# Patient Record
Sex: MALE | Race: ASIAN | NOT HISPANIC OR LATINO | Employment: FULL TIME | URBAN - METROPOLITAN AREA
[De-identification: names, ages, dates, MRNs, and addresses within clinical notes are randomized per-mention and may not be internally consistent; named-entity substitution may affect disease eponyms.]

---

## 2022-09-25 ENCOUNTER — APPOINTMENT (OUTPATIENT)
Dept: CT IMAGING | Facility: CLINIC | Age: 32
DRG: 488 | End: 2022-09-25
Attending: EMERGENCY MEDICINE
Payer: COMMERCIAL

## 2022-09-25 ENCOUNTER — HOSPITAL ENCOUNTER (INPATIENT)
Facility: CLINIC | Age: 32
LOS: 9 days | Discharge: HOME HEALTH | DRG: 488 | End: 2022-10-05
Attending: PHYSICIAN ASSISTANT | Admitting: INTERNAL MEDICINE
Payer: COMMERCIAL

## 2022-09-25 ENCOUNTER — APPOINTMENT (OUTPATIENT)
Dept: GENERAL RADIOLOGY | Facility: CLINIC | Age: 32
DRG: 488 | End: 2022-09-25
Attending: PHYSICIAN ASSISTANT
Payer: COMMERCIAL

## 2022-09-25 DIAGNOSIS — M25.461 EFFUSION OF RIGHT KNEE JOINT: ICD-10-CM

## 2022-09-25 DIAGNOSIS — R07.89 LEFT-SIDED CHEST WALL PAIN: ICD-10-CM

## 2022-09-25 DIAGNOSIS — R50.9 FEBRILE ILLNESS: ICD-10-CM

## 2022-09-25 DIAGNOSIS — M00.261 STREPTOCOCCAL ARTHRITIS OF RIGHT KNEE (H): Primary | ICD-10-CM

## 2022-09-25 LAB
ALBUMIN SERPL BCG-MCNC: 3.8 G/DL (ref 3.5–5.2)
ALP SERPL-CCNC: 83 U/L (ref 40–129)
ALT SERPL W P-5'-P-CCNC: 38 U/L (ref 10–50)
ANION GAP SERPL CALCULATED.3IONS-SCNC: 12 MMOL/L (ref 7–15)
APPEARANCE FLD: ABNORMAL
AST SERPL W P-5'-P-CCNC: 27 U/L (ref 10–50)
BASOPHILS # BLD AUTO: 0 10E3/UL (ref 0–0.2)
BASOPHILS NFR BLD AUTO: 0 %
BILIRUB DIRECT SERPL-MCNC: 0.5 MG/DL (ref 0–0.3)
BILIRUB SERPL-MCNC: 1.3 MG/DL
BUN SERPL-MCNC: 10.7 MG/DL (ref 6–20)
CALCIUM SERPL-MCNC: 9.3 MG/DL (ref 8.6–10)
CELL COUNT BODY FLUID SOURCE: ABNORMAL
CHLORIDE SERPL-SCNC: 99 MMOL/L (ref 98–107)
COLOR FLD: YELLOW
CREAT SERPL-MCNC: 0.84 MG/DL (ref 0.67–1.17)
CRP SERPL-MCNC: 309.97 MG/L
DEPRECATED HCO3 PLAS-SCNC: 24 MMOL/L (ref 22–29)
EOSINOPHIL # BLD AUTO: 0 10E3/UL (ref 0–0.7)
EOSINOPHIL NFR BLD AUTO: 0 %
ERYTHROCYTE [DISTWIDTH] IN BLOOD BY AUTOMATED COUNT: 13.1 % (ref 10–15)
ERYTHROCYTE [SEDIMENTATION RATE] IN BLOOD BY WESTERGREN METHOD: 73 MM/HR (ref 0–15)
FLUAV RNA SPEC QL NAA+PROBE: NEGATIVE
FLUBV RNA RESP QL NAA+PROBE: NEGATIVE
GFR SERPL CREATININE-BSD FRML MDRD: >90 ML/MIN/1.73M2
GLUCOSE SERPL-MCNC: 113 MG/DL (ref 70–99)
HCT VFR BLD AUTO: 37.7 % (ref 40–53)
HGB BLD-MCNC: 12.5 G/DL (ref 13.3–17.7)
IMM GRANULOCYTES # BLD: 0.1 10E3/UL
IMM GRANULOCYTES NFR BLD: 1 %
LYMPHOCYTES # BLD AUTO: 1.6 10E3/UL (ref 0.8–5.3)
LYMPHOCYTES NFR BLD AUTO: 13 %
LYMPHOCYTES NFR FLD MANUAL: 19 %
MCH RBC QN AUTO: 29.3 PG (ref 26.5–33)
MCHC RBC AUTO-ENTMCNC: 33.2 G/DL (ref 31.5–36.5)
MCV RBC AUTO: 88 FL (ref 78–100)
MONOCYTES # BLD AUTO: 1.7 10E3/UL (ref 0–1.3)
MONOCYTES NFR BLD AUTO: 14 %
MONOS+MACROS NFR FLD MANUAL: 57 %
NEUTROPHILS # BLD AUTO: 9.1 10E3/UL (ref 1.6–8.3)
NEUTROPHILS NFR BLD AUTO: 72 %
NEUTS BAND NFR FLD MANUAL: 24 %
NRBC # BLD AUTO: 0 10E3/UL
NRBC BLD AUTO-RTO: 0 /100
PLATELET # BLD AUTO: 218 10E3/UL (ref 150–450)
POTASSIUM SERPL-SCNC: 3.9 MMOL/L (ref 3.4–5.3)
PROT SERPL-MCNC: 8.1 G/DL (ref 6.4–8.3)
RBC # BLD AUTO: 4.27 10E6/UL (ref 4.4–5.9)
SARS-COV-2 RNA RESP QL NAA+PROBE: NEGATIVE
SODIUM SERPL-SCNC: 135 MMOL/L (ref 136–145)
URATE SERPL-MCNC: 3.9 MG/DL (ref 3.4–7)
WBC # BLD AUTO: 12.5 10E3/UL (ref 4–11)
WBC # FLD AUTO: ABNORMAL /UL

## 2022-09-25 PROCEDURE — 250N000013 HC RX MED GY IP 250 OP 250 PS 637: Performed by: PHYSICIAN ASSISTANT

## 2022-09-25 PROCEDURE — 73701 CT LOWER EXTREMITY W/DYE: CPT | Mod: RT

## 2022-09-25 PROCEDURE — 250N000011 HC RX IP 250 OP 636: Performed by: EMERGENCY MEDICINE

## 2022-09-25 PROCEDURE — 87040 BLOOD CULTURE FOR BACTERIA: CPT | Performed by: EMERGENCY MEDICINE

## 2022-09-25 PROCEDURE — 250N000013 HC RX MED GY IP 250 OP 250 PS 637: Performed by: EMERGENCY MEDICINE

## 2022-09-25 PROCEDURE — 73562 X-RAY EXAM OF KNEE 3: CPT | Mod: RT

## 2022-09-25 PROCEDURE — 86140 C-REACTIVE PROTEIN: CPT | Performed by: EMERGENCY MEDICINE

## 2022-09-25 PROCEDURE — 80053 COMPREHEN METABOLIC PANEL: CPT | Performed by: EMERGENCY MEDICINE

## 2022-09-25 PROCEDURE — C9803 HOPD COVID-19 SPEC COLLECT: HCPCS

## 2022-09-25 PROCEDURE — 87476 LYME DIS DNA AMP PROBE: CPT | Performed by: INTERNAL MEDICINE

## 2022-09-25 PROCEDURE — 85025 COMPLETE CBC W/AUTO DIFF WBC: CPT | Performed by: EMERGENCY MEDICINE

## 2022-09-25 PROCEDURE — 87205 SMEAR GRAM STAIN: CPT | Performed by: PHYSICIAN ASSISTANT

## 2022-09-25 PROCEDURE — 99285 EMERGENCY DEPT VISIT HI MDM: CPT | Mod: 25

## 2022-09-25 PROCEDURE — 36415 COLL VENOUS BLD VENIPUNCTURE: CPT | Performed by: EMERGENCY MEDICINE

## 2022-09-25 PROCEDURE — 82248 BILIRUBIN DIRECT: CPT | Performed by: EMERGENCY MEDICINE

## 2022-09-25 PROCEDURE — 71046 X-RAY EXAM CHEST 2 VIEWS: CPT

## 2022-09-25 PROCEDURE — 87077 CULTURE AEROBIC IDENTIFY: CPT | Performed by: EMERGENCY MEDICINE

## 2022-09-25 PROCEDURE — 89060 EXAM SYNOVIAL FLUID CRYSTALS: CPT | Performed by: EMERGENCY MEDICINE

## 2022-09-25 PROCEDURE — 85652 RBC SED RATE AUTOMATED: CPT | Performed by: EMERGENCY MEDICINE

## 2022-09-25 PROCEDURE — 250N000009 HC RX 250: Performed by: EMERGENCY MEDICINE

## 2022-09-25 PROCEDURE — 84550 ASSAY OF BLOOD/URIC ACID: CPT | Performed by: EMERGENCY MEDICINE

## 2022-09-25 PROCEDURE — 89051 BODY FLUID CELL COUNT: CPT | Performed by: EMERGENCY MEDICINE

## 2022-09-25 PROCEDURE — 87636 SARSCOV2 & INF A&B AMP PRB: CPT | Performed by: PHYSICIAN ASSISTANT

## 2022-09-25 PROCEDURE — 99285 EMERGENCY DEPT VISIT HI MDM: CPT | Mod: 25 | Performed by: EMERGENCY MEDICINE

## 2022-09-25 PROCEDURE — 96375 TX/PRO/DX INJ NEW DRUG ADDON: CPT

## 2022-09-25 PROCEDURE — 86618 LYME DISEASE ANTIBODY: CPT | Performed by: EMERGENCY MEDICINE

## 2022-09-25 RX ORDER — HYDROMORPHONE HYDROCHLORIDE 1 MG/ML
0.3 INJECTION, SOLUTION INTRAMUSCULAR; INTRAVENOUS; SUBCUTANEOUS ONCE
Status: COMPLETED | OUTPATIENT
Start: 2022-09-25 | End: 2022-09-25

## 2022-09-25 RX ORDER — IOPAMIDOL 755 MG/ML
100 INJECTION, SOLUTION INTRAVASCULAR ONCE
Status: COMPLETED | OUTPATIENT
Start: 2022-09-25 | End: 2022-09-25

## 2022-09-25 RX ORDER — OXYCODONE AND ACETAMINOPHEN 5; 325 MG/1; MG/1
1 TABLET ORAL EVERY 6 HOURS PRN
Qty: 6 TABLET | Refills: 0 | Status: SHIPPED | OUTPATIENT
Start: 2022-09-25 | End: 2022-10-05

## 2022-09-25 RX ORDER — OXYCODONE HYDROCHLORIDE 5 MG/1
5 TABLET ORAL ONCE
Status: COMPLETED | OUTPATIENT
Start: 2022-09-25 | End: 2022-09-25

## 2022-09-25 RX ORDER — NAPROXEN 500 MG/1
500 TABLET ORAL 2 TIMES DAILY WITH MEALS
Qty: 24 TABLET | Refills: 0 | Status: SHIPPED | OUTPATIENT
Start: 2022-09-25 | End: 2022-10-05

## 2022-09-25 RX ORDER — OXYCODONE AND ACETAMINOPHEN 5; 325 MG/1; MG/1
1 TABLET ORAL ONCE
Status: DISCONTINUED | OUTPATIENT
Start: 2022-09-25 | End: 2022-09-25

## 2022-09-25 RX ORDER — ACETAMINOPHEN 500 MG
1000 TABLET ORAL ONCE
Status: COMPLETED | OUTPATIENT
Start: 2022-09-25 | End: 2022-09-25

## 2022-09-25 RX ADMIN — OXYCODONE HYDROCHLORIDE 5 MG: 5 TABLET ORAL at 19:27

## 2022-09-25 RX ADMIN — IOPAMIDOL 100 ML: 755 INJECTION, SOLUTION INTRAVENOUS at 20:34

## 2022-09-25 RX ADMIN — ACETAMINOPHEN 1000 MG: 500 TABLET, FILM COATED ORAL at 15:42

## 2022-09-25 RX ADMIN — SODIUM CHLORIDE 70 ML: 9 INJECTION, SOLUTION INTRAVENOUS at 20:34

## 2022-09-25 RX ADMIN — HYDROMORPHONE HYDROCHLORIDE 0.3 MG: 1 INJECTION, SOLUTION INTRAMUSCULAR; INTRAVENOUS; SUBCUTANEOUS at 20:47

## 2022-09-25 ASSESSMENT — ACTIVITIES OF DAILY LIVING (ADL)
ADLS_ACUITY_SCORE: 35

## 2022-09-25 NOTE — ED PROVIDER NOTES
History     Chief Complaint   Patient presents with     Knee Pain     Patient with history of gout in knee. Patient has had severe pain for four days.      Shoulder Pain     Patient had food poisoning on Thursday, had been sleeping on stomach. Now has left shoulder pain and collarbone from the way he was sleeping. Patient arrives in sling; patient is tearful. Patient has been taking Tylenol, ibuprofen and aspirin.      ADIS Guerrero is a 32 year old male who presents the urgent care with concern over left shoulder/arm pain, right knee pain and resolved vomiting.  Patient reports that he became ill 9/21/2022 with fever up to 107, multiple episodes of vomiting, dry heaving.  Vomiting has resolved however he has subsequently developed pain in his left shoulder, left clavicle and left trapezius as well as pain and swelling in his right knee with decreased range of motion.  He is unaware of any overlying erythema rashes or skin changes.  No distal numbness or paresthesias.  He denies any nasal congestion, sore throat, cough, dyspnea, wheezing, abdominal pain,dysuria, hematuria  at this time.  He has not attempted any OTC treatments consistently.  He does report history of similar knee swelling the past however not as severe. He denies any known history of tick bites or stings.      Allergies:  No Known Allergies    Problem List:    There are no problems to display for this patient.     Past Medical History:    No past medical history on file.    Past Surgical History:    No past surgical history on file.    Family History:    No family history on file.    Social History:  Marital Status:  Single [1]        Medications:    No current outpatient medications on file.    Review of Systems   Constitutional: Positive for chills, fatigue and fever.   HENT: Negative for congestion, ear pain and sore throat.    Eyes: Negative for photophobia and visual disturbance.   Respiratory: Negative for cough, shortness of breath and  wheezing.    Gastrointestinal: Positive for diarrhea, nausea and vomiting. Negative for abdominal pain.        Resolved   Genitourinary: Negative for dysuria and hematuria.   Musculoskeletal: Positive for arthralgias (right knee, left shoulder).   Skin: Negative for color change, rash and wound.   Neurological: Negative for dizziness, light-headedness and headaches.     Physical Exam   BP: (!) 157/95  Pulse: 110  Temp: (!) 100.6  F (38.1  C)  Resp: 16  Weight: 85.7 kg (189 lb)  SpO2: 100 %  Physical Exam  GENERAL APPEARANCE: alert, cooperative, patient appears in some pain   EYES: EOMI,  PERRL, conjunctiva clear  HENT: ear canals and TM's normal.  Nose and mouth without ulcers, erythema or lesions  NECK: supple, nontender, no lymphadenopathy  RESP: lungs clear to auscultation - no rales, rhonchi or wheezes  CV: regular rates and rhythm, normal S1 S2, no murmur noted  MS:  decreased ROM actively of the left shoulder with full active ROM with pain tenderness to palpation to light touch, effusion of right knee with significant suprapatellar swelling, decreased ROM actively and passively due to discomfort, no overlying erythema, warmth, distal neurovascular status intact.    SKIN: no suspicious lesions or rashes    ED Course           Procedures              Critical Care time:  none               No results found for this or any previous visit (from the past 24 hour(s)).    Medications   acetaminophen (TYLENOL) tablet 1,000 mg (has no administration in time range)       Assessments & Plan (with Medical Decision Making)     I have reviewed the nursing notes.    I have reviewed the findings, diagnosis, plan and need for follow up with the patient.       Current Discharge Medication List      START taking these medications    Details   amoxicillin-clavulanate (AUGMENTIN) 875-125 MG tablet Take 1 tablet by mouth 2 times daily  Qty: 20 tablet, Refills: 0      naproxen (NAPROSYN) 500 MG tablet Take 1 tablet (500 mg) by mouth  2 times daily (with meals) for 12 days  Qty: 24 tablet, Refills: 0      oxyCODONE-acetaminophen (PERCOCET) 5-325 MG tablet Take 1 tablet by mouth every 6 hours as needed for severe pain (7-10)  Qty: 6 tablet, Refills: 0           Final diagnoses:   Febrile illness   Left-sided chest wall pain   Effusion of right knee joint - gout/pseudogout vs. infected joint     72-year-old male presents to the urgent care initially with concern over left knee pain, decreased range of motion and high fever with resolved vomiting.  He was noted to be febrile upon arrival with tachycardia, hypertension.  He had initial evaluation in urgent care included negative COVID-19 testing.  X-ray of his chest was negative for acute abnormality and x-ray of the knee showed an effusion.  I did recommend transfer to the emergency room for further evaluation of joint pain given fever and absence of other infectious symptoms.  Patient was amenable to this.  He was transferred to ED triage nurse with further evaluation and medical decision making at discretion of ED provider.    Disclaimer: This note consists of symbols derived from keyboarding, dictation, and/or voice recognition software. As a result, there may be errors in the script that have gone undetected.  Please consider this when interpreting information found in the chart.      9/25/2022   [facility] EMERGENCY DEPT     Elle Shi PA-C  09/27/22 1201

## 2022-09-25 NOTE — ED NOTES
Patient is reporting that he had a fever of 107 on Wednesday. Writer verified with patient that they meant 107 and not 100.7. Both patient and significant other state that his fever was 107.

## 2022-09-25 NOTE — ED PROVIDER NOTES
History     Chief Complaint   Patient presents with     Knee Pain     Patient with history of gout in knee. Patient has had severe pain for four days.      Shoulder Pain     Patient had food poisoning on Thursday, had been sleeping on stomach. Now has left shoulder pain and collarbone from the way he was sleeping. Patient arrives in sling; patient is tearful. Patient has been taking Tylenol, ibuprofen and aspirin.      ADIS Guerrero is a 32 year old male with no significant past medical history presents emergency department complaining of left shoulder pain and right knee pain.  Patient states he developed food poisoning on Thursday and has been vomiting a lot.  He began having some left shoulder pain and then noted that he has been having significant swelling in his right knee.  He vomited Thursday and Friday and this is resolved he had a high fever at that time and it is gotten lower now he has developed significant pain in his anterior chest wall and shoulder and also has swelling in his right knee with pain.  Patient states he does not think these are related but unsure he has had knee swelling before but this is more significant.  He denies any headache or visual changes denies any neck pain he is not any chest pain or shortness of breath denies any abdominal pain at this time he has pain in his left shoulder and anterior chest wall.  This is reproducible.  He denies any back pain he has swelling of his right knee with pain with movement.  Denies any calf pain he has not had any focal numbness weakness in extremity.    Allergies:  No Known Allergies    Problem List:    There are no problems to display for this patient.       Past Medical History:    No past medical history on file.    Past Surgical History:    No past surgical history on file.    Family History:    No family history on file.    Social History:  Marital Status:  Single [1]        Medications:    No current outpatient medications on  file.        Review of Systems  All systems reviewed and other than pertinent positives and negatives in HPI all other systems are negative.  Physical Exam   BP: (!) 157/95  Pulse: 110  Temp: (!) 100.6  F (38.1  C)  Resp: 16  Weight: 85.7 kg (189 lb)  SpO2: 100 %      Physical Exam    ED Course                 Arthrocentesis    Date/Time: 9/25/2022 1:53 PM  Performed by: Dameon Grande MD  Authorized by: Dameon Grande MD     Risks, benefits and alternatives discussed.      LOCATION:   Location:  Knee  ANESTHESIA (see MAR for exact dosages):   Anesthesia method:  Local infiltration  Local anesthetic:  Bupivacaine 0.5% WITH epi      PROCEDURE DETAILS:     Needle gauge:  18 G    Ultrasound guidance: no      Approach:  Lateral    Aspirate characteristics:  Cloudy and yellow    Steroid injected: no      Specimen collected: yes      Dressing: adhesive bandage      PROCEDURE    Patient Tolerance:  Patient tolerated the procedure well with no immediate complications                Critical Care time:  none               Results for orders placed or performed during the hospital encounter of 09/25/22 (from the past 24 hour(s))   Symptomatic; Yes; 9/21/2022 Influenza A/B & SARS-CoV2 (COVID-19) Virus PCR Multiplex Nasopharyngeal    Specimen: Nasopharyngeal; Swab   Result Value Ref Range    Influenza A PCR Negative Negative    Influenza B PCR Negative Negative    SARS CoV2 PCR Negative Negative    Narrative    Testing was performed using the wyatt SARS-CoV-2 & Influenza A/B Assay on the wyatt Jovanna System. This test should be ordered for the detection of SARS-CoV-2 and influenza viruses in individuals who meet clinical and/or epidemiological criteria. Test performance is unknown in asymptomatic patients. This test is for in vitro diagnostic use under the FDA EUA for laboratories certified under CLIA to perform moderate and/or high complexity testing. This test has not been FDA cleared or approved. A negative  result does not rule out the presence of PCR inhibitors in the specimen or target RNA in concentration below the limit of detection for the assay. If only one viral target is positive but coinfection with multiple targets is suspected, the sample should be re-tested with another FDA cleared, approved or authorized test, if coinfection would change clinical management. [facility] are certified under the Clinical Laboratory Improvement Amendments of 1988 (CLIA-88) as  qualified to perform moderate and/or high complexity laboratory testing.   XR Knee Right 3 Views    Narrative    EXAM: XR KNEE RIGHT 3 VIEWS  LOCATION: [facility]  DATE/TIME: 9/25/2022 4:16 PM    INDICATION: pain  COMPARISON: None.      Impression    IMPRESSION: Normal joint spaces and alignment. No fracture. Moderate joint effusion.   XR Chest 2 Views    Narrative    EXAM: XR CHEST 2 VIEWS  LOCATION: [facility]  DATE/TIME: 9/25/2022 4:18 PM    INDICATION: Left chest and shoulder pain  COMPARISON: None.      Impression    IMPRESSION: Cardiomediastinal silhouette is normal. Normal vasculature. Lungs and pleural spaces are clear. No fracture evident.       Medications   acetaminophen (TYLENOL) tablet 1,000 mg (1,000 mg Oral Given 9/25/22 1542)   oxyCODONE (ROXICODONE) tablet 5 mg (5 mg Oral Given 9/25/22 1927)   iopamidol (ISOVUE-370) solution 100 mL (100 mLs Intravenous Given 9/25/22 2034)   sodium chloride 0.9 % bag 500mL for CT scan flush use (70 mLs Intravenous Given 9/25/22 2034)   HYDROmorphone (PF) (DILAUDID) injection 0.3 mg (0.3 mg Intravenous Given 9/25/22 2047)     Results for orders placed or performed during the hospital encounter of 09/25/22   XR Chest 2 Views    Narrative    EXAM: XR CHEST 2 VIEWS  LOCATION: [facility]  DATE/TIME: 9/25/2022 4:18 PM    INDICATION: Left chest and shoulder pain  COMPARISON: None.      Impression     IMPRESSION: Cardiomediastinal silhouette is normal. Normal vasculature. Lungs and pleural spaces are clear. No fracture evident.   XR Knee Right 3 Views    Narrative    EXAM: XR KNEE RIGHT 3 VIEWS  LOCATION: [facility]  DATE/TIME: 9/25/2022 4:16 PM    INDICATION: pain  COMPARISON: None.      Impression    IMPRESSION: Normal joint spaces and alignment. No fracture. Moderate joint effusion.   CT Knee Right w Contrast    Narrative    EXAM: CT KNEE RIGHT W CONTRAST  LOCATION: [facility]  DATE/TIME: 9/25/2022 8:46 PM    INDICATION: Right distal femur anterior swelling and knee swelling. Rule out abscess or other abnormality.  COMPARISON: Radiographs from 9/25/2022.  TECHNIQUE: IV contrast. Axial, sagittal and coronal thin-section reconstruction. Dose reduction techniques were used.   CONTRAST: 100 mL Isovue 370.    FINDINGS:     BONES AND JOINTS:  -There is a massive knee joint effusion. No calcified intra-articular body. No fracture. Mild osteoarthrosis of the patellofemoral joint.    SOFT TISSUES:  -No hematoma, cyst or mass. No gross muscle or tendon pathology. No evidence of abscess.      Impression    IMPRESSION:  1.  Massive knee joint effusion.  2.  Mild osteoarthrosis of the patellofemoral joint.  3.  Otherwise negative.           Assessments & Plan (with Medical Decision Making) records were reviewed.  Labs were obtained.  Patient was given Dilaudid for pain.  CBC with a white count of 12.5 hemoglobin of 12.5.  The count was 218.  COVID was negative.  His metabolic panel without significant abnormality.  His CRP is elevated at 309 and sed rate is 73.  Uric acid was 3.9 hepatic panel with a slightly elevated beta bilirubin 1.3.  Due to the significant swelling of the knee it was decided to proceed with a CT scanning of the knee.  This revealed a massive knee joint effusion.  There was mild osteoarthritis of the patellofemoral joint.  Otherwise negative.  I  discussed case with Dr. Heck  with [facility].  I reviewed the imaging studies and labs.  He recommended tapping the knee.  I did proceed and steroid tap the knee and a lateral superior approach where there was a large amount of fluid.  I removed approximately 100 mL of yellow thick cloudy fluid.  Gram stain cell count crystal count and cultures were ordered.  Dr. Heck had advised to treat the patient with oral antibiotics Augmentin as well as treatment for gout/pseudogout.  Patient is awaiting results of the cell count.  Patient will be signed out to Dr. Cao to follow-up on this test.  With patient's febrile illness would consider possible septic joint if the cell counts are so predicted.  We will await results of cell count and if positive patient may need IV antibiotics and further assessment by Ortho.  Patient was signed out to Dr. Cao for further evaluation and care.     I have reviewed the nursing notes.    I have reviewed the findings, diagnosis, plan and need for follow up with the patient.           Final diagnoses:   Febrile illness   Left-sided chest wall pain   Effusion of right knee joint - gout/pseudogout vs. infected joint       9/25/2022   [facility] EMERGENCY DEPT     Dameon Grande MD  09/27/22 7039

## 2022-09-26 ENCOUNTER — ANESTHESIA EVENT (OUTPATIENT)
Dept: SURGERY | Facility: CLINIC | Age: 32
DRG: 488 | End: 2022-09-26
Payer: COMMERCIAL

## 2022-09-26 ENCOUNTER — ANESTHESIA (OUTPATIENT)
Dept: SURGERY | Facility: CLINIC | Age: 32
DRG: 488 | End: 2022-09-26
Payer: COMMERCIAL

## 2022-09-26 PROBLEM — R50.9 FEBRILE ILLNESS: Status: ACTIVE | Noted: 2022-09-26

## 2022-09-26 PROBLEM — M25.461 EFFUSION OF RIGHT KNEE JOINT: Status: ACTIVE | Noted: 2022-09-26

## 2022-09-26 PROBLEM — R07.89 LEFT-SIDED CHEST WALL PAIN: Status: ACTIVE | Noted: 2022-09-26

## 2022-09-26 PROBLEM — M00.9 SEPTIC ARTHRITIS OF KNEE, RIGHT (H): Status: ACTIVE | Noted: 2022-09-26

## 2022-09-26 LAB
ANION GAP SERPL CALCULATED.3IONS-SCNC: 10 MMOL/L (ref 7–15)
B BURGDOR IGG+IGM SER QL: 0.06
BUN SERPL-MCNC: 9.6 MG/DL (ref 6–20)
CALCIUM SERPL-MCNC: 8.7 MG/DL (ref 8.6–10)
CHLORIDE SERPL-SCNC: 99 MMOL/L (ref 98–107)
CREAT SERPL-MCNC: 0.81 MG/DL (ref 0.67–1.17)
CRYSTALS SNV MICRO: NORMAL
DEPRECATED HCO3 PLAS-SCNC: 26 MMOL/L (ref 22–29)
ERYTHROCYTE [DISTWIDTH] IN BLOOD BY AUTOMATED COUNT: 13.1 % (ref 10–15)
GFR SERPL CREATININE-BSD FRML MDRD: >90 ML/MIN/1.73M2
GLUCOSE SERPL-MCNC: 126 MG/DL (ref 70–99)
GRAM STAIN RESULT: NORMAL
HCT VFR BLD AUTO: 34.8 % (ref 40–53)
HGB BLD-MCNC: 11.8 G/DL (ref 13.3–17.7)
LACTATE SERPL-SCNC: 1 MMOL/L (ref 0.7–2)
MCH RBC QN AUTO: 28.9 PG (ref 26.5–33)
MCHC RBC AUTO-ENTMCNC: 33.9 G/DL (ref 31.5–36.5)
MCV RBC AUTO: 85 FL (ref 78–100)
PLATELET # BLD AUTO: 226 10E3/UL (ref 150–450)
POTASSIUM SERPL-SCNC: 4 MMOL/L (ref 3.4–5.3)
RBC # BLD AUTO: 4.09 10E6/UL (ref 4.4–5.9)
SODIUM SERPL-SCNC: 135 MMOL/L (ref 136–145)
WBC # BLD AUTO: 11.9 10E3/UL (ref 4–11)

## 2022-09-26 PROCEDURE — 99223 1ST HOSP IP/OBS HIGH 75: CPT | Mod: AI | Performed by: INTERNAL MEDICINE

## 2022-09-26 PROCEDURE — 80048 BASIC METABOLIC PNL TOTAL CA: CPT | Performed by: INTERNAL MEDICINE

## 2022-09-26 PROCEDURE — 36415 COLL VENOUS BLD VENIPUNCTURE: CPT | Performed by: INTERNAL MEDICINE

## 2022-09-26 PROCEDURE — 85014 HEMATOCRIT: CPT | Performed by: INTERNAL MEDICINE

## 2022-09-26 PROCEDURE — 96365 THER/PROPH/DIAG IV INF INIT: CPT | Mod: 59

## 2022-09-26 PROCEDURE — 250N000011 HC RX IP 250 OP 636: Performed by: NURSE ANESTHETIST, CERTIFIED REGISTERED

## 2022-09-26 PROCEDURE — 99207 PR APP CREDIT; MD BILLING SHARED VISIT: CPT | Performed by: INTERNAL MEDICINE

## 2022-09-26 PROCEDURE — 87491 CHLMYD TRACH DNA AMP PROBE: CPT | Performed by: INTERNAL MEDICINE

## 2022-09-26 PROCEDURE — 250N000013 HC RX MED GY IP 250 OP 250 PS 637: Performed by: EMERGENCY MEDICINE

## 2022-09-26 PROCEDURE — 87070 CULTURE OTHR SPECIMN AEROBIC: CPT | Performed by: ORTHOPAEDIC SURGERY

## 2022-09-26 PROCEDURE — 87205 SMEAR GRAM STAIN: CPT | Performed by: ORTHOPAEDIC SURGERY

## 2022-09-26 PROCEDURE — 87075 CULTR BACTERIA EXCEPT BLOOD: CPT | Performed by: ORTHOPAEDIC SURGERY

## 2022-09-26 PROCEDURE — 272N000001 HC OR GENERAL SUPPLY STERILE: Performed by: ORTHOPAEDIC SURGERY

## 2022-09-26 PROCEDURE — 370N000017 HC ANESTHESIA TECHNICAL FEE, PER MIN: Performed by: ORTHOPAEDIC SURGERY

## 2022-09-26 PROCEDURE — 83605 ASSAY OF LACTIC ACID: CPT | Performed by: EMERGENCY MEDICINE

## 2022-09-26 PROCEDURE — 271N000001 HC OR GENERAL SUPPLY NON-STERILE: Performed by: ORTHOPAEDIC SURGERY

## 2022-09-26 PROCEDURE — 250N000013 HC RX MED GY IP 250 OP 250 PS 637: Performed by: INTERNAL MEDICINE

## 2022-09-26 PROCEDURE — 250N000011 HC RX IP 250 OP 636: Performed by: EMERGENCY MEDICINE

## 2022-09-26 PROCEDURE — 250N000025 HC SEVOFLURANE, PER MIN: Performed by: ORTHOPAEDIC SURGERY

## 2022-09-26 PROCEDURE — 36415 COLL VENOUS BLD VENIPUNCTURE: CPT | Performed by: EMERGENCY MEDICINE

## 2022-09-26 PROCEDURE — 250N000009 HC RX 250: Performed by: NURSE ANESTHETIST, CERTIFIED REGISTERED

## 2022-09-26 PROCEDURE — 258N000003 HC RX IP 258 OP 636: Performed by: EMERGENCY MEDICINE

## 2022-09-26 PROCEDURE — 999N000141 HC STATISTIC PRE-PROCEDURE NURSING ASSESSMENT: Performed by: ORTHOPAEDIC SURGERY

## 2022-09-26 PROCEDURE — 710N000009 HC RECOVERY PHASE 1, LEVEL 1, PER MIN: Performed by: ORTHOPAEDIC SURGERY

## 2022-09-26 PROCEDURE — 87591 N.GONORRHOEAE DNA AMP PROB: CPT | Performed by: INTERNAL MEDICINE

## 2022-09-26 PROCEDURE — 0R9F3ZX DRAINAGE OF LEFT STERNOCLAVICULAR JOINT, PERCUTANEOUS APPROACH, DIAGNOSTIC: ICD-10-PCS | Performed by: ORTHOPAEDIC SURGERY

## 2022-09-26 PROCEDURE — 250N000013 HC RX MED GY IP 250 OP 250 PS 637: Performed by: ORTHOPAEDIC SURGERY

## 2022-09-26 PROCEDURE — 250N000011 HC RX IP 250 OP 636: Performed by: INTERNAL MEDICINE

## 2022-09-26 PROCEDURE — 360N000076 HC SURGERY LEVEL 3, PER MIN: Performed by: ORTHOPAEDIC SURGERY

## 2022-09-26 PROCEDURE — 250N000013 HC RX MED GY IP 250 OP 250 PS 637: Performed by: NURSE ANESTHETIST, CERTIFIED REGISTERED

## 2022-09-26 PROCEDURE — 120N000001 HC R&B MED SURG/OB

## 2022-09-26 PROCEDURE — 258N000003 HC RX IP 258 OP 636: Performed by: NURSE ANESTHETIST, CERTIFIED REGISTERED

## 2022-09-26 PROCEDURE — 87040 BLOOD CULTURE FOR BACTERIA: CPT | Performed by: EMERGENCY MEDICINE

## 2022-09-26 PROCEDURE — 89060 EXAM SYNOVIAL FLUID CRYSTALS: CPT | Performed by: ORTHOPAEDIC SURGERY

## 2022-09-26 PROCEDURE — 0SBC4ZZ EXCISION OF RIGHT KNEE JOINT, PERCUTANEOUS ENDOSCOPIC APPROACH: ICD-10-PCS | Performed by: ORTHOPAEDIC SURGERY

## 2022-09-26 RX ORDER — AMOXICILLIN 250 MG
1 CAPSULE ORAL 2 TIMES DAILY
Status: DISCONTINUED | OUTPATIENT
Start: 2022-09-26 | End: 2022-10-05 | Stop reason: HOSPADM

## 2022-09-26 RX ORDER — PROPOFOL 10 MG/ML
INJECTION, EMULSION INTRAVENOUS CONTINUOUS PRN
Status: DISCONTINUED | OUTPATIENT
Start: 2022-09-26 | End: 2022-09-26

## 2022-09-26 RX ORDER — LIDOCAINE 40 MG/G
CREAM TOPICAL
Status: DISCONTINUED | OUTPATIENT
Start: 2022-09-26 | End: 2022-09-27

## 2022-09-26 RX ORDER — ONDANSETRON 2 MG/ML
4 INJECTION INTRAMUSCULAR; INTRAVENOUS EVERY 6 HOURS PRN
Status: DISCONTINUED | OUTPATIENT
Start: 2022-09-26 | End: 2022-10-05 | Stop reason: HOSPADM

## 2022-09-26 RX ORDER — ONDANSETRON 2 MG/ML
INJECTION INTRAMUSCULAR; INTRAVENOUS PRN
Status: DISCONTINUED | OUTPATIENT
Start: 2022-09-26 | End: 2022-09-26

## 2022-09-26 RX ORDER — HYDROMORPHONE HCL IN WATER/PF 6 MG/30 ML
0.4 PATIENT CONTROLLED ANALGESIA SYRINGE INTRAVENOUS
Status: DISCONTINUED | OUTPATIENT
Start: 2022-09-26 | End: 2022-10-05 | Stop reason: HOSPADM

## 2022-09-26 RX ORDER — ACETAMINOPHEN 325 MG/1
975 TABLET ORAL EVERY 8 HOURS
Status: DISPENSED | OUTPATIENT
Start: 2022-09-27 | End: 2022-09-29

## 2022-09-26 RX ORDER — CETIRIZINE HYDROCHLORIDE 10 MG/1
10 TABLET ORAL DAILY PRN
COMMUNITY
End: 2023-02-16

## 2022-09-26 RX ORDER — HYDROMORPHONE HCL IN WATER/PF 6 MG/30 ML
0.2 PATIENT CONTROLLED ANALGESIA SYRINGE INTRAVENOUS
Status: DISCONTINUED | OUTPATIENT
Start: 2022-09-26 | End: 2022-10-05 | Stop reason: HOSPADM

## 2022-09-26 RX ORDER — HYDROMORPHONE HCL IN WATER/PF 6 MG/30 ML
0.4 PATIENT CONTROLLED ANALGESIA SYRINGE INTRAVENOUS EVERY 5 MIN PRN
Status: DISCONTINUED | OUTPATIENT
Start: 2022-09-26 | End: 2022-09-26 | Stop reason: HOSPADM

## 2022-09-26 RX ORDER — ACETAMINOPHEN 325 MG/1
975 TABLET ORAL ONCE
Status: COMPLETED | OUTPATIENT
Start: 2022-09-26 | End: 2022-09-26

## 2022-09-26 RX ORDER — NALOXONE HYDROCHLORIDE 0.4 MG/ML
0.4 INJECTION, SOLUTION INTRAMUSCULAR; INTRAVENOUS; SUBCUTANEOUS
Status: DISCONTINUED | OUTPATIENT
Start: 2022-09-26 | End: 2022-10-05 | Stop reason: HOSPADM

## 2022-09-26 RX ORDER — SODIUM CHLORIDE, SODIUM LACTATE, POTASSIUM CHLORIDE, CALCIUM CHLORIDE 600; 310; 30; 20 MG/100ML; MG/100ML; MG/100ML; MG/100ML
INJECTION, SOLUTION INTRAVENOUS CONTINUOUS
Status: DISCONTINUED | OUTPATIENT
Start: 2022-09-26 | End: 2022-09-27

## 2022-09-26 RX ORDER — KETAMINE HYDROCHLORIDE 10 MG/ML
INJECTION INTRAMUSCULAR; INTRAVENOUS PRN
Status: DISCONTINUED | OUTPATIENT
Start: 2022-09-26 | End: 2022-09-26

## 2022-09-26 RX ORDER — ONDANSETRON 4 MG/1
4 TABLET, ORALLY DISINTEGRATING ORAL EVERY 6 HOURS PRN
Status: DISCONTINUED | OUTPATIENT
Start: 2022-09-26 | End: 2022-10-05 | Stop reason: HOSPADM

## 2022-09-26 RX ORDER — ACETAMINOPHEN 325 MG/1
650 TABLET ORAL ONCE
Status: COMPLETED | OUTPATIENT
Start: 2022-09-26 | End: 2022-09-26

## 2022-09-26 RX ORDER — NALOXONE HYDROCHLORIDE 0.4 MG/ML
0.2 INJECTION, SOLUTION INTRAMUSCULAR; INTRAVENOUS; SUBCUTANEOUS
Status: DISCONTINUED | OUTPATIENT
Start: 2022-09-26 | End: 2022-10-05 | Stop reason: HOSPADM

## 2022-09-26 RX ORDER — LIDOCAINE 40 MG/G
CREAM TOPICAL
Status: DISCONTINUED | OUTPATIENT
Start: 2022-09-26 | End: 2022-10-05 | Stop reason: HOSPADM

## 2022-09-26 RX ORDER — OXYCODONE HYDROCHLORIDE 5 MG/1
10 TABLET ORAL EVERY 4 HOURS PRN
Status: DISCONTINUED | OUTPATIENT
Start: 2022-09-26 | End: 2022-10-05 | Stop reason: HOSPADM

## 2022-09-26 RX ORDER — IBUPROFEN 400 MG/1
400 TABLET, FILM COATED ORAL EVERY 6 HOURS PRN
Status: DISCONTINUED | OUTPATIENT
Start: 2022-09-26 | End: 2022-10-05 | Stop reason: HOSPADM

## 2022-09-26 RX ORDER — POLYETHYLENE GLYCOL 3350 17 G/17G
17 POWDER, FOR SOLUTION ORAL DAILY
Status: DISCONTINUED | OUTPATIENT
Start: 2022-09-27 | End: 2022-10-05 | Stop reason: HOSPADM

## 2022-09-26 RX ORDER — DEXTROSE, SODIUM CHLORIDE, SODIUM LACTATE, POTASSIUM CHLORIDE, AND CALCIUM CHLORIDE 5; .6; .31; .03; .02 G/100ML; G/100ML; G/100ML; G/100ML; G/100ML
INJECTION, SOLUTION INTRAVENOUS CONTINUOUS
Status: DISCONTINUED | OUTPATIENT
Start: 2022-09-26 | End: 2022-09-26

## 2022-09-26 RX ORDER — SODIUM CHLORIDE, SODIUM LACTATE, POTASSIUM CHLORIDE, CALCIUM CHLORIDE 600; 310; 30; 20 MG/100ML; MG/100ML; MG/100ML; MG/100ML
INJECTION, SOLUTION INTRAVENOUS CONTINUOUS
Status: DISCONTINUED | OUTPATIENT
Start: 2022-09-26 | End: 2022-09-26 | Stop reason: HOSPADM

## 2022-09-26 RX ORDER — ASPIRIN 81 MG/1
81 TABLET ORAL 2 TIMES DAILY
Status: DISCONTINUED | OUTPATIENT
Start: 2022-09-26 | End: 2022-10-05 | Stop reason: HOSPADM

## 2022-09-26 RX ORDER — PROCHLORPERAZINE MALEATE 5 MG
10 TABLET ORAL EVERY 6 HOURS PRN
Status: DISCONTINUED | OUTPATIENT
Start: 2022-09-26 | End: 2022-10-05 | Stop reason: HOSPADM

## 2022-09-26 RX ORDER — ONDANSETRON 4 MG/1
4 TABLET, ORALLY DISINTEGRATING ORAL EVERY 30 MIN PRN
Status: DISCONTINUED | OUTPATIENT
Start: 2022-09-26 | End: 2022-09-26 | Stop reason: HOSPADM

## 2022-09-26 RX ORDER — BISACODYL 10 MG
10 SUPPOSITORY, RECTAL RECTAL DAILY PRN
Status: DISCONTINUED | OUTPATIENT
Start: 2022-09-26 | End: 2022-10-05 | Stop reason: HOSPADM

## 2022-09-26 RX ORDER — HYDROMORPHONE HCL IN WATER/PF 6 MG/30 ML
0.2 PATIENT CONTROLLED ANALGESIA SYRINGE INTRAVENOUS
Status: DISCONTINUED | OUTPATIENT
Start: 2022-09-26 | End: 2022-09-26

## 2022-09-26 RX ORDER — PROPOFOL 10 MG/ML
INJECTION, EMULSION INTRAVENOUS PRN
Status: DISCONTINUED | OUTPATIENT
Start: 2022-09-26 | End: 2022-09-26

## 2022-09-26 RX ORDER — IBUPROFEN 600 MG/1
600 TABLET, FILM COATED ORAL EVERY 6 HOURS PRN
Status: DISCONTINUED | OUTPATIENT
Start: 2022-09-26 | End: 2022-09-26

## 2022-09-26 RX ORDER — GLYCOPYRROLATE 0.2 MG/ML
INJECTION, SOLUTION INTRAMUSCULAR; INTRAVENOUS PRN
Status: DISCONTINUED | OUTPATIENT
Start: 2022-09-26 | End: 2022-09-26

## 2022-09-26 RX ORDER — FENTANYL CITRATE 50 UG/ML
50 INJECTION, SOLUTION INTRAMUSCULAR; INTRAVENOUS
Status: DISCONTINUED | OUTPATIENT
Start: 2022-09-26 | End: 2022-09-28 | Stop reason: CLARIF

## 2022-09-26 RX ORDER — FENTANYL CITRATE 50 UG/ML
50 INJECTION, SOLUTION INTRAMUSCULAR; INTRAVENOUS EVERY 5 MIN PRN
Status: DISCONTINUED | OUTPATIENT
Start: 2022-09-26 | End: 2022-09-26 | Stop reason: HOSPADM

## 2022-09-26 RX ORDER — ALBUTEROL SULFATE 0.83 MG/ML
2.5 SOLUTION RESPIRATORY (INHALATION) EVERY 4 HOURS PRN
Status: DISCONTINUED | OUTPATIENT
Start: 2022-09-26 | End: 2022-09-26 | Stop reason: HOSPADM

## 2022-09-26 RX ORDER — KETOROLAC TROMETHAMINE 30 MG/ML
INJECTION, SOLUTION INTRAMUSCULAR; INTRAVENOUS PRN
Status: DISCONTINUED | OUTPATIENT
Start: 2022-09-26 | End: 2022-09-26

## 2022-09-26 RX ORDER — MAGNESIUM SULFATE HEPTAHYDRATE 40 MG/ML
INJECTION, SOLUTION INTRAVENOUS PRN
Status: DISCONTINUED | OUTPATIENT
Start: 2022-09-26 | End: 2022-09-26

## 2022-09-26 RX ORDER — FENTANYL CITRATE 50 UG/ML
INJECTION, SOLUTION INTRAMUSCULAR; INTRAVENOUS PRN
Status: DISCONTINUED | OUTPATIENT
Start: 2022-09-26 | End: 2022-09-26

## 2022-09-26 RX ORDER — LIDOCAINE 40 MG/G
CREAM TOPICAL
Status: DISCONTINUED | OUTPATIENT
Start: 2022-09-26 | End: 2022-09-26 | Stop reason: HOSPADM

## 2022-09-26 RX ORDER — OXYCODONE HYDROCHLORIDE 5 MG/1
5 TABLET ORAL EVERY 4 HOURS PRN
Status: DISCONTINUED | OUTPATIENT
Start: 2022-09-26 | End: 2022-10-05 | Stop reason: HOSPADM

## 2022-09-26 RX ORDER — HYDROMORPHONE HCL IN WATER/PF 6 MG/30 ML
.2-.5 PATIENT CONTROLLED ANALGESIA SYRINGE INTRAVENOUS
Status: DISCONTINUED | OUTPATIENT
Start: 2022-09-26 | End: 2022-09-26

## 2022-09-26 RX ORDER — CEFAZOLIN SODIUM 1 G/50ML
1250 SOLUTION INTRAVENOUS EVERY 12 HOURS
Status: DISCONTINUED | OUTPATIENT
Start: 2022-09-26 | End: 2022-09-28

## 2022-09-26 RX ORDER — ACETAMINOPHEN 325 MG/1
650 TABLET ORAL EVERY 6 HOURS PRN
Status: DISCONTINUED | OUTPATIENT
Start: 2022-09-26 | End: 2022-10-05 | Stop reason: HOSPADM

## 2022-09-26 RX ORDER — ONDANSETRON 2 MG/ML
4 INJECTION INTRAMUSCULAR; INTRAVENOUS EVERY 30 MIN PRN
Status: DISCONTINUED | OUTPATIENT
Start: 2022-09-26 | End: 2022-09-26 | Stop reason: HOSPADM

## 2022-09-26 RX ORDER — LORATADINE 10 MG/1
10 TABLET ORAL DAILY PRN
COMMUNITY

## 2022-09-26 RX ORDER — MEPERIDINE HYDROCHLORIDE 25 MG/ML
12.5 INJECTION INTRAMUSCULAR; INTRAVENOUS; SUBCUTANEOUS
Status: DISCONTINUED | OUTPATIENT
Start: 2022-09-26 | End: 2022-09-26 | Stop reason: HOSPADM

## 2022-09-26 RX ADMIN — FENTANYL CITRATE 50 MCG: 50 INJECTION, SOLUTION INTRAMUSCULAR; INTRAVENOUS at 16:20

## 2022-09-26 RX ADMIN — KETOROLAC TROMETHAMINE 30 MG: 30 INJECTION, SOLUTION INTRAMUSCULAR at 15:30

## 2022-09-26 RX ADMIN — KETAMINE HYDROCHLORIDE 25 MG: 10 INJECTION, SOLUTION INTRAMUSCULAR; INTRAVENOUS at 14:38

## 2022-09-26 RX ADMIN — ONDANSETRON 4 MG: 2 INJECTION INTRAMUSCULAR; INTRAVENOUS at 14:33

## 2022-09-26 RX ADMIN — CEFEPIME 2 G: 2 INJECTION, POWDER, FOR SOLUTION INTRAVENOUS at 17:28

## 2022-09-26 RX ADMIN — KETAMINE HYDROCHLORIDE 25 MG: 10 INJECTION, SOLUTION INTRAMUSCULAR; INTRAVENOUS at 14:35

## 2022-09-26 RX ADMIN — FENTANYL CITRATE 100 MCG: 50 INJECTION, SOLUTION INTRAMUSCULAR; INTRAVENOUS at 14:23

## 2022-09-26 RX ADMIN — MAGNESIUM SULFATE HEPTAHYDRATE 2 G: 40 INJECTION, SOLUTION INTRAVENOUS at 15:05

## 2022-09-26 RX ADMIN — PROPOFOL 100 MG: 10 INJECTION, EMULSION INTRAVENOUS at 14:34

## 2022-09-26 RX ADMIN — SODIUM CHLORIDE, POTASSIUM CHLORIDE, SODIUM LACTATE AND CALCIUM CHLORIDE: 600; 310; 30; 20 INJECTION, SOLUTION INTRAVENOUS at 14:17

## 2022-09-26 RX ADMIN — CEFEPIME 2 G: 2 INJECTION, POWDER, FOR SOLUTION INTRAVENOUS at 01:32

## 2022-09-26 RX ADMIN — IBUPROFEN 400 MG: 400 TABLET ORAL at 13:12

## 2022-09-26 RX ADMIN — SODIUM CHLORIDE, POTASSIUM CHLORIDE, SODIUM LACTATE AND CALCIUM CHLORIDE: 600; 310; 30; 20 INJECTION, SOLUTION INTRAVENOUS at 15:00

## 2022-09-26 RX ADMIN — PROPOFOL 25 MG: 10 INJECTION, EMULSION INTRAVENOUS at 14:23

## 2022-09-26 RX ADMIN — GLYCOPYRROLATE 0.2 MG: 0.2 INJECTION, SOLUTION INTRAMUSCULAR; INTRAVENOUS at 14:32

## 2022-09-26 RX ADMIN — ASPIRIN 81 MG: 81 TABLET, COATED ORAL at 19:59

## 2022-09-26 RX ADMIN — PROPOFOL 250 MG: 10 INJECTION, EMULSION INTRAVENOUS at 14:33

## 2022-09-26 RX ADMIN — PROPOFOL 50 MG: 10 INJECTION, EMULSION INTRAVENOUS at 15:15

## 2022-09-26 RX ADMIN — VANCOMYCIN HYDROCHLORIDE 1250 MG: 10 INJECTION, POWDER, LYOPHILIZED, FOR SOLUTION INTRAVENOUS at 04:00

## 2022-09-26 RX ADMIN — VANCOMYCIN HYDROCHLORIDE 1250 MG: 10 INJECTION, POWDER, LYOPHILIZED, FOR SOLUTION INTRAVENOUS at 18:23

## 2022-09-26 RX ADMIN — CEFEPIME 2 G: 2 INJECTION, POWDER, FOR SOLUTION INTRAVENOUS at 09:01

## 2022-09-26 RX ADMIN — FENTANYL CITRATE 100 MCG: 50 INJECTION, SOLUTION INTRAMUSCULAR; INTRAVENOUS at 15:25

## 2022-09-26 RX ADMIN — AMOXICILLIN AND CLAVULANATE POTASSIUM 1 TABLET: 875; 125 TABLET, FILM COATED ORAL at 00:14

## 2022-09-26 RX ADMIN — ACETAMINOPHEN 975 MG: 325 TABLET, FILM COATED ORAL at 16:24

## 2022-09-26 RX ADMIN — OXYCODONE HYDROCHLORIDE 5 MG: 5 TABLET ORAL at 18:22

## 2022-09-26 RX ADMIN — ACETAMINOPHEN 650 MG: 325 TABLET, FILM COATED ORAL at 09:35

## 2022-09-26 RX ADMIN — SUGAMMADEX 200 MG: 100 INJECTION, SOLUTION INTRAVENOUS at 15:32

## 2022-09-26 RX ADMIN — FENTANYL CITRATE 100 MCG: 50 INJECTION, SOLUTION INTRAMUSCULAR; INTRAVENOUS at 15:39

## 2022-09-26 RX ADMIN — SODIUM CHLORIDE, SODIUM LACTATE, POTASSIUM CHLORIDE, CALCIUM CHLORIDE AND DEXTROSE MONOHYDRATE: 5; 600; 310; 30; 20 INJECTION, SOLUTION INTRAVENOUS at 03:59

## 2022-09-26 RX ADMIN — PROPOFOL 50 MG: 10 INJECTION, EMULSION INTRAVENOUS at 14:57

## 2022-09-26 RX ADMIN — HYDROMORPHONE HYDROCHLORIDE 0.2 MG: 0.2 INJECTION, SOLUTION INTRAMUSCULAR; INTRAVENOUS; SUBCUTANEOUS at 17:27

## 2022-09-26 RX ADMIN — ROCURONIUM BROMIDE 50 MG: 50 INJECTION, SOLUTION INTRAVENOUS at 14:33

## 2022-09-26 RX ADMIN — PROPOFOL 25 MG: 10 INJECTION, EMULSION INTRAVENOUS at 14:39

## 2022-09-26 RX ADMIN — HYDROMORPHONE HYDROCHLORIDE 0.2 MG: 0.2 INJECTION, SOLUTION INTRAMUSCULAR; INTRAVENOUS; SUBCUTANEOUS at 01:15

## 2022-09-26 RX ADMIN — PROPOFOL 50 MCG/KG/MIN: 10 INJECTION, EMULSION INTRAVENOUS at 15:05

## 2022-09-26 RX ADMIN — ACETAMINOPHEN 650 MG: 325 TABLET, FILM COATED ORAL at 01:07

## 2022-09-26 RX ADMIN — ACETAMINOPHEN 650 MG: 325 TABLET, FILM COATED ORAL at 18:22

## 2022-09-26 RX ADMIN — HYDROMORPHONE HYDROCHLORIDE 0.2 MG: 0.2 INJECTION, SOLUTION INTRAMUSCULAR; INTRAVENOUS; SUBCUTANEOUS at 12:42

## 2022-09-26 RX ADMIN — SENNOSIDES AND DOCUSATE SODIUM 1 TABLET: 50; 8.6 TABLET ORAL at 19:59

## 2022-09-26 RX ADMIN — IBUPROFEN 400 MG: 400 TABLET ORAL at 05:21

## 2022-09-26 ASSESSMENT — ACTIVITIES OF DAILY LIVING (ADL)
DIFFICULTY_EATING/SWALLOWING: NO
ADLS_ACUITY_SCORE: 38
WALKING_OR_CLIMBING_STAIRS_DIFFICULTY: YES
TOILETING: 1-->ASSISTANCE (EQUIPMENT/PERSON) NEEDED (NOT DEVELOPMENTALLY APPROPRIATE)
ADLS_ACUITY_SCORE: 38
ADLS_ACUITY_SCORE: 38
DRESSING/BATHING_DIFFICULTY: YES
ADLS_ACUITY_SCORE: 38
CHANGE_IN_FUNCTIONAL_STATUS_SINCE_ONSET_OF_CURRENT_ILLNESS/INJURY: YES
WEAR_GLASSES_OR_BLIND: NO
BATHING: 1-->ASSISTANCE NEEDED
DRESSING/BATHING: BATHING DIFFICULTY, REQUIRES EQUIPMENT;DRESSING DIFFICULTY, ASSISTANCE 1 PERSON
CONCENTRATING,_REMEMBERING_OR_MAKING_DECISIONS_DIFFICULTY: NO
DOING_ERRANDS_INDEPENDENTLY_DIFFICULTY: YES
ADLS_ACUITY_SCORE: 35
DIFFICULTY_COMMUNICATING: NO
ADLS_ACUITY_SCORE: 38
FALL_HISTORY_WITHIN_LAST_SIX_MONTHS: NO
TOILETING_ASSISTANCE: TOILETING DIFFICULTY, REQUIRES EQUIPMENT
TRANSFERRING: 1-->ASSISTANCE (EQUIPMENT/PERSON) NEEDED (NOT DEVELOPMENTALLY APPROPRIATE)
ADLS_ACUITY_SCORE: 38
DRESS: 1-->ASSISTANCE (EQUIPMENT/PERSON) NEEDED (NOT DEVELOPMENTALLY APPROPRIATE)
TOILETING_ISSUES: YES
TOILETING: 1-->ASSISTANCE (EQUIPMENT/PERSON) NEEDED
ADLS_ACUITY_SCORE: 38
HEARING_DIFFICULTY_OR_DEAF: NO
ADLS_ACUITY_SCORE: 38
ADLS_ACUITY_SCORE: 38
TRANSFERRING: 1-->ASSISTANCE (EQUIPMENT/PERSON) NEEDED
ADLS_ACUITY_SCORE: 38
ADLS_ACUITY_SCORE: 35
DRESS: 1-->ASSISTANCE (EQUIPMENT/PERSON) NEEDED

## 2022-09-26 NOTE — BRIEF OP NOTE
[facility] Brief Operative Note    Pre-operative diagnosis: Septic arthritis of knee (H) [M00.9]   Post-operative diagnosis Same   Procedure: Procedure(s):  Right knee arthroscopic irrigation and debridement, partial synovectomy and left sternoclavicular joint needle aspiration   Surgeon(s): Surgeon(s) and Role:     * Jovani Hahn MD - Primary   Estimated blood loss: * No values recorded between 9/26/2022  2:40 PM and 9/26/2022  4:03 PM *    Specimens: ID Type Source Tests Collected by Time Destination   A : Left sternoclavicular joint fluid Synovial fluid Joint, Other CELL COUNT WITH DIFFERENTIAL FLUID (Canceled), CRYSTAL ID SYNOVIAL FLUID Jovani Hahn MD 9/26/2022  2:46 PM    B : Left sternoclavicular joint fluid Synovial fluid Joint, Other ANAEROBIC BACTERIAL CULTURE ROUTINE, GRAM STAIN, AEROBIC BACTERIAL CULTURE ROUTINE Jovani Hahn MD 9/26/2022  2:48 PM    C : Right knee joint Synovial fluid Knee, Right CRYSTAL ID SYNOVIAL FLUID Jovani Hahn MD 9/26/2022  3:03 PM    D : Right knee joint Synovial fluid Knee, Right ANAEROBIC BACTERIAL CULTURE ROUTINE, GRAM STAIN, AEROBIC BACTERIAL CULTURE ROUTINE Jovani Hahn MD 9/26/2022  3:04 PM       Findings: Significant turbid, yellow colored fluid in joint

## 2022-09-26 NOTE — PROGRESS NOTES
[facility] ADMISSION NOTE    Patient admitted to room 2403 at approximately 0300 via cart from emergency room. Patient was accompanied by transport tech.     Verbal SBAR report received from Latesha AWAD prior to patient arrival.     Patient trasferred to bed via air ethan. Patient alert and oriented X 3. The patient is not having any pain.  . Admission vital signs: Blood pressure 137/89, pulse 112, temperature (!) 100.8  F (38.2  C), temperature source Oral, resp. rate 18, height 1.829 m (6'), weight 86.9 kg (191 lb 9.3 oz), SpO2 99 %. Patient was oriented to plan of care, call light, bed controls, tv, telephone, bathroom and visiting hours.     Risk Assessment    The following safety risks were identified during admission: fall. Yellow risk band applied: NO.     Skin Initial Assessment    This writer admitted this patient and completed a full skin assessment and Jese score in the Adult PCS flowsheet. Appropriate interventions initiated as needed.     Secondary skin check not completed, patient refused.         Education    Patient has a Nice to Observation order: No  Observation education completed and documented: N/A      Mary Schirmers, RN

## 2022-09-26 NOTE — ED PROVIDER NOTES
Emergency Department Patient Sign-out       Brief HPI:  This is a 32 year old male signed out to me by Dr. Grande .  See initial ED Provider note for details of the presentation.            Significant Events prior to my assuming care: none      Exam:   Patient Vitals for the past 24 hrs:   BP Temp Temp src Pulse Resp SpO2 Height Weight   09/26/22 0108 -- -- -- -- -- -- 1.829 m (6') --   09/26/22 0100 -- -- -- -- -- -- 1.829 m (6') --   09/26/22 0029 -- (!) 101.3  F (38.5  C) Oral -- -- -- -- --   09/26/22 0000 (!) 131/92 -- -- 117 -- 96 % -- --   09/25/22 2300 (!) 144/99 -- -- 116 -- 98 % -- --   09/25/22 2100 -- -- -- -- -- 99 % -- --   09/25/22 2050 -- -- -- -- -- 99 % -- --   09/25/22 2010 (!) 140/95 -- -- 104 -- 99 % -- --   09/25/22 1521 -- -- -- -- -- -- -- 85.7 kg (189 lb)   09/25/22 1519 (!) 157/95 (!) 100.6  F (38.1  C) Tympanic 110 16 100 % -- --           ED RESULTS:   Results for orders placed or performed during the hospital encounter of 09/25/22 (from the past 24 hour(s))   Symptomatic; Yes; 9/21/2022 Influenza A/B & SARS-CoV2 (COVID-19) Virus PCR Multiplex Nasopharyngeal     Status: Normal    Collection Time: 09/25/22  3:43 PM    Specimen: Nasopharyngeal; Swab   Result Value Ref Range    Influenza A PCR Negative Negative    Influenza B PCR Negative Negative    SARS CoV2 PCR Negative Negative    Narrative    Testing was performed using the wyatt SARS-CoV-2 & Influenza A/B Assay on the wyatt Jovanna System. This test should be ordered for the detection of SARS-CoV-2 and influenza viruses in individuals who meet clinical and/or epidemiological criteria. Test performance is unknown in asymptomatic patients. This test is for in vitro diagnostic use under the FDA EUA for laboratories certified under CLIA to perform moderate and/or high complexity testing. This test has not been FDA cleared or approved. A negative result does not rule out the presence of PCR inhibitors in the specimen or target RNA in  concentration below the limit of detection for the assay. If only one viral target is positive but coinfection with multiple targets is suspected, the sample should be re-tested with another FDA cleared, approved or authorized test, if coinfection would change clinical management. [facility] are certified under the Clinical Laboratory Improvement Amendments of 1988 (CLIA-88) as  qualified to perform moderate and/or high complexity laboratory testing.   XR Knee Right 3 Views     Status: None    Collection Time: 09/25/22  4:16 PM    Narrative    EXAM: XR KNEE RIGHT 3 VIEWS  LOCATION: [facility]  DATE/TIME: 9/25/2022 4:16 PM    INDICATION: pain  COMPARISON: None.      Impression    IMPRESSION: Normal joint spaces and alignment. No fracture. Moderate joint effusion.   XR Chest 2 Views     Status: None    Collection Time: 09/25/22  4:18 PM    Narrative    EXAM: XR CHEST 2 VIEWS  LOCATION: [facility]  DATE/TIME: 9/25/2022 4:18 PM    INDICATION: Left chest and shoulder pain  COMPARISON: None.      Impression    IMPRESSION: Cardiomediastinal silhouette is normal. Normal vasculature. Lungs and pleural spaces are clear. No fracture evident.   CBC with platelets differential     Status: Abnormal    Collection Time: 09/25/22  6:54 PM    Narrative    The following orders were created for panel order CBC with platelets differential.  Procedure                               Abnormality         Status                     ---------                               -----------         ------                     CBC with platelets and d...[[id number]]  Abnormal            Final result                 Please view results for these tests on the individual orders.   Basic metabolic panel     Status: Abnormal    Collection Time: 09/25/22  6:54 PM   Result Value Ref Range    Sodium 135 (L) 136 - 145 mmol/L    Potassium 3.9 3.4 - 5.3 mmol/L    Chloride 99 98 - 107  mmol/L    Carbon Dioxide (CO2) 24 22 - 29 mmol/L    Anion Gap 12 7 - 15 mmol/L    Urea Nitrogen 10.7 6.0 - 20.0 mg/dL    Creatinine 0.84 0.67 - 1.17 mg/dL    Calcium 9.3 8.6 - 10.0 mg/dL    Glucose 113 (H) 70 - 99 mg/dL    GFR Estimate >90 >60 mL/min/1.73m2   CRP inflammation     Status: Abnormal    Collection Time: 09/25/22  6:54 PM   Result Value Ref Range    CRP Inflammation 309.97 (H) <5.00 mg/L   Uric acid     Status: Normal    Collection Time: 09/25/22  6:54 PM   Result Value Ref Range    Uric Acid 3.9 3.4 - 7.0 mg/dL   Erythrocyte sedimentation rate auto     Status: Abnormal    Collection Time: 09/25/22  6:54 PM   Result Value Ref Range    Erythrocyte Sedimentation Rate 73 (H) 0 - 15 mm/hr   CBC with platelets and differential     Status: Abnormal    Collection Time: 09/25/22  6:54 PM   Result Value Ref Range    WBC Count 12.5 (H) 4.0 - 11.0 10e3/uL    RBC Count 4.27 (L) 4.40 - 5.90 10e6/uL    Hemoglobin 12.5 (L) 13.3 - 17.7 g/dL    Hematocrit 37.7 (L) 40.0 - 53.0 %    MCV 88 78 - 100 fL    MCH 29.3 26.5 - 33.0 pg    MCHC 33.2 31.5 - 36.5 g/dL    RDW 13.1 10.0 - 15.0 %    Platelet Count 218 150 - 450 10e3/uL    % Neutrophils 72 %    % Lymphocytes 13 %    % Monocytes 14 %    % Eosinophils 0 %    % Basophils 0 %    % Immature Granulocytes 1 %    NRBCs per 100 WBC 0 <1 /100    Absolute Neutrophils 9.1 (H) 1.6 - 8.3 10e3/uL    Absolute Lymphocytes 1.6 0.8 - 5.3 10e3/uL    Absolute Monocytes 1.7 (H) 0.0 - 1.3 10e3/uL    Absolute Eosinophils 0.0 0.0 - 0.7 10e3/uL    Absolute Basophils 0.0 0.0 - 0.2 10e3/uL    Absolute Immature Granulocytes 0.1 <=0.4 10e3/uL    Absolute NRBCs 0.0 10e3/uL   Hepatic panel     Status: Abnormal    Collection Time: 09/25/22  6:54 PM   Result Value Ref Range    Protein Total 8.1 6.4 - 8.3 g/dL    Albumin 3.8 3.5 - 5.2 g/dL    Bilirubin Total 1.3 (H) <=1.2 mg/dL    Alkaline Phosphatase 83 40 - 129 U/L    AST 27 10 - 50 U/L    ALT 38 10 - 50 U/L    Bilirubin Direct 0.50 (H) 0.00 - 0.30  mg/dL   CT Knee Right w Contrast     Status: None    Collection Time: 09/25/22  8:46 PM    Narrative    EXAM: CT KNEE RIGHT W CONTRAST  LOCATION: [facility]  DATE/TIME: 9/25/2022 8:46 PM    INDICATION: Right distal femur anterior swelling and knee swelling. Rule out abscess or other abnormality.  COMPARISON: Radiographs from 9/25/2022.  TECHNIQUE: IV contrast. Axial, sagittal and coronal thin-section reconstruction. Dose reduction techniques were used.   CONTRAST: 100 mL Isovue 370.    FINDINGS:     BONES AND JOINTS:  -There is a massive knee joint effusion. No calcified intra-articular body. No fracture. Mild osteoarthrosis of the patellofemoral joint.    SOFT TISSUES:  -No hematoma, cyst or mass. No gross muscle or tendon pathology. No evidence of abscess.      Impression    IMPRESSION:  1.  Massive knee joint effusion.  2.  Mild osteoarthrosis of the patellofemoral joint.  3.  Otherwise negative.     Cell count with differential fluid     Status: Abnormal    Collection Time: 09/25/22 10:00 PM    Narrative    The following orders were created for panel order Cell count with differential fluid.  Procedure                               Abnormality         Status                     ---------                               -----------         ------                     Cell Count Body Fluid[[id number]]        Abnormal            Final result               Differential Body Fluid[[id number]]                          Final result                 Please view results for these tests on the individual orders.   Cell Count Body Fluid     Status: Abnormal    Collection Time: 09/25/22 10:00 PM   Result Value Ref Range    Color Yellow Colorless, Yellow    Clarity Turbid (A) Clear    Total Nucleated Cells 353,380 /uL    Cell Count Fluid Source Knee, Right     Narrative    No reference ranges have been established.  This result  should be interpreted in the context of the patient's clinical condition  and   compared to simultaneous measurement in the patient's blood.         Differential Body Fluid     Status: None    Collection Time: 09/25/22 10:00 PM   Result Value Ref Range    % Neutrophils 24 %    % Lymphocytes 19 %    % Monocyte/Macrophages 57 %    Narrative    No reference ranges have been established. This result should be interpreted in the context of the patient's clinical condition and compared to simultaneous measurement in the patient's blood.       ED MEDICATIONS:   Medications   ceFEPIme (MAXIPIME) 2 g vial to attach to  ml bag for ADULTS or 50 ml bag for PEDS (has no administration in time range)   HYDROmorphone (DILAUDID) injection 0.2-0.5 mg (has no administration in time range)   dextrose 5% in lactated ringers infusion (has no administration in time range)   acetaminophen (TYLENOL) tablet 1,000 mg (1,000 mg Oral Given 9/25/22 1542)   oxyCODONE (ROXICODONE) tablet 5 mg (5 mg Oral Given 9/25/22 1927)   iopamidol (ISOVUE-370) solution 100 mL (100 mLs Intravenous Given 9/25/22 2034)   sodium chloride 0.9 % bag 500mL for CT scan flush use (70 mLs Intravenous Given 9/25/22 2034)   HYDROmorphone (PF) (DILAUDID) injection 0.3 mg (0.3 mg Intravenous Given 9/25/22 2047)   amoxicillin-clavulanate (AUGMENTIN) 875-125 MG per tablet 1 tablet (1 tablet Oral Given 9/26/22 0014)   acetaminophen (TYLENOL) tablet 650 mg (650 mg Oral Given 9/26/22 0107)         Impression:    ICD-10-CM    1. Febrile illness  R50.9    2. Left-sided chest wall pain  R07.89    3. Effusion of right knee joint  M25.461 Orthopedic  Referral    gout/pseudogout vs. infected joint     Patient Vitals for the past 24 hrs:   BP Temp Temp src Pulse Resp SpO2 Height Weight   09/26/22 0108 -- -- -- -- -- -- 1.829 m (6') --   09/26/22 0100 -- -- -- -- -- -- 1.829 m (6') --   09/26/22 0029 -- (!) 101.3  F (38.5  C) Oral -- -- -- -- --   09/26/22 0000 (!) 131/92 -- -- 117 -- 96 % -- --   09/25/22 2300 (!) 144/99 -- -- 116 -- 98 % --  --   09/25/22 2100 -- -- -- -- -- 99 % -- --   09/25/22 2050 -- -- -- -- -- 99 % -- --   09/25/22 2010 (!) 140/95 -- -- 104 -- 99 % -- --   09/25/22 1521 -- -- -- -- -- -- -- 85.7 kg (189 lb)   09/25/22 1519 (!) 157/95 (!) 100.6  F (38.1  C) Tympanic 110 16 100 % -- --         Plan:    Pending studies include Awaiting fluid cell counts from joint aspiration.      12:24 AM: Patient reassessed at bedside.  Resting comfortably.  Feels much better after aspiration of his knee.  Patient did have a low-grade temperature upon arrival here and continues to have a fever even higher right now.  This strongly raises my concerned that he could have an infected joint.  Patient has cultures pending and was started on Augmentin.  Does report that he has had this several times in a very similar manner in the years past.  Symptoms have been very similar and have always resolved within a few days to a week.  Has never been formally diagnosed with gout.  Initial fluid counts show a large number of fluids with monocyte predominance.  Clinically he is well-appearing but with concern for possible septic joint.  Patient very much wants to go home and does not want to stay in the hospital.  I explicitly stated my recommendation was to be admitted and transitioned over to IV antibiotics given the concern for septic joint.  Patient states he lives close by and would prefer to go home.  He is willing to come back tomorrow if things worsen or if his fluid counts show that he needs a washout of his knee.  At this point however, he would like to go home and sleep in his bed.  Patient advised that with his abnormal vital signs I am worried about him and I believe that admission is the best choice.  He is however alert and oriented and he expresses understanding and agrees to return tomorrow if needed.      12:55 AM: Nurse was able to talk to him further and convince him to reconsider his choice.  I talked with him again as well and patient is now  amenable to staying which I do believe to be the best decision given his increasing heart rate and temperature. Will draw a lactic acid and second blood culture (only one done initially). Ordering cefepime and vancomycin. Paging ortho to update re: admission and initial cell counts.    1:12 AM: Discussed with Dr Heck, orthopedics. He is agreeable with plan for antibiotics and he will pass along to his colleagues here in the AM to evaluate the patient for possible scope of the knee later today.     1:49 AM: Discussed with Dr Bonilla, accepts for admission.     MD Nash Burgos, Gustavo Arthur MD  09/26/22 0155

## 2022-09-26 NOTE — PROGRESS NOTES
[facility] TRANSPORT NOTE  Data:   Reason for Transport:  Post op    Sanket Guerrero was transported from PACU via cart at 1710.  Patient was accompanied by Nursing Assistant. Equipment used for transport: Hemovac. Family was aware of reason for transport: yes    Action:  Report: received from LINA Feng RN    Response:  Patient's condition upon return was alert and oriented. Rates pain 8/10. 0.2 mg IV dilaudid given.     Sharri Kirby RN

## 2022-09-26 NOTE — PROGRESS NOTES
[facility] TRANSPORT NOTE  Data:   Reason for Transport:  surgery    Sanket LIZANDRO Ly was transported from med surg via wheel chair at 1320.  Patient was accompanied by Nursing Assistant. Equipment used for transport: None. Family was aware of reason for transport: yes, girlfriend present.    Action:  Report: given to Lesa    Response:  Patient's condition when transferred off unit was alert and oriented.    Sharri Kirby RN

## 2022-09-26 NOTE — ED NOTES
[facility]   Admission Handoff    The patient is Sanket Guerrero, 32 year old who arrived in the ED by WHEELCHAIR from home with a complaint of Knee Pain (Patient with history of gout in knee. Patient has had severe pain for four days. ) and Shoulder Pain (Patient had food poisoning on Thursday, had been sleeping on stomach. Now has left shoulder pain and collarbone from the way he was sleeping. Patient arrives in sling; patient is tearful. Patient has been taking Tylenol, ibuprofen and aspirin. )  . The patient's current symptoms are a recurrence of a past episode and during this time the symptoms have increased. In the ED, patient was diagnosed with   Final diagnoses:   Febrile illness   Left-sided chest wall pain   Effusion of right knee joint - gout/pseudogout vs. infected joint         Needed?: No    Allergies:  No Known Allergies    Past Medical Hx: No past medical history on file.    Initial vitals were: BP: (!) 157/95  Pulse: 110  Temp: (!) 100.6  F (38.1  C)  Resp: 16  Height: 182.9 cm (6')  Weight: 85.7 kg (189 lb)  SpO2: 100 %   Recent vital Signs: BP (!) 131/92   Pulse 117   Temp (!) 101.3  F (38.5  C) (Oral)   Resp 16   Ht 1.829 m (6')   Wt 85.7 kg (189 lb)   SpO2 96%   BMI 25.63 kg/m      Elimination Status: Continent: Yes     Activity Level: sba    Baseline Mental status: WDL  Current Mental Status changes: at basesline    Infection present or suspected this encounter: yes skin/wound/contact and COVID r/o and special precautions  Sepsis suspected: Yes    Isolation type: none    Bariatric equipment needed?: No    In the ED these meds were given:   Medications   ceFEPIme (MAXIPIME) 2 g vial to attach to  ml bag for ADULTS or 50 ml bag for PEDS (2 g Intravenous New Bag 9/26/22 0132)   HYDROmorphone (DILAUDID) injection 0.2-0.5 mg (0.2 mg Intravenous Given 9/26/22 0115)   dextrose 5% in lactated ringers infusion (has no administration in time range)    vancomycin (VANCOCIN) 1,250 mg in sodium chloride 0.9 % 250 mL intermittent infusion (has no administration in time range)   acetaminophen (TYLENOL) tablet 1,000 mg (1,000 mg Oral Given 9/25/22 1542)   oxyCODONE (ROXICODONE) tablet 5 mg (5 mg Oral Given 9/25/22 1927)   iopamidol (ISOVUE-370) solution 100 mL (100 mLs Intravenous Given 9/25/22 2034)   sodium chloride 0.9 % bag 500mL for CT scan flush use (70 mLs Intravenous Given 9/25/22 2034)   HYDROmorphone (PF) (DILAUDID) injection 0.3 mg (0.3 mg Intravenous Given 9/25/22 2047)   amoxicillin-clavulanate (AUGMENTIN) 875-125 MG per tablet 1 tablet (1 tablet Oral Given 9/26/22 0014)   acetaminophen (TYLENOL) tablet 650 mg (650 mg Oral Given 9/26/22 0107)       Drips running?  Yes    Home pump  No    Current LDAs: Peripheral IV: Site 18g; Right FA        Results:   Labs/Imaging  Ordered and Resulted from Time of ED Arrival Up to the Time of Departure from the ED  Results for orders placed or performed during the hospital encounter of 09/25/22 (from the past 24 hour(s))   Symptomatic; Yes; 9/21/2022 Influenza A/B & SARS-CoV2 (COVID-19) Virus PCR Multiplex Nasopharyngeal    Specimen: Nasopharyngeal; Swab   Result Value Ref Range    Influenza A PCR Negative Negative    Influenza B PCR Negative Negative    SARS CoV2 PCR Negative Negative    Narrative    Testing was performed using the wyatt SARS-CoV-2 & Influenza A/B Assay on the wyatt Jovanna System. This test should be ordered for the detection of SARS-CoV-2 and influenza viruses in individuals who meet clinical and/or epidemiological criteria. Test performance is unknown in asymptomatic patients. This test is for in vitro diagnostic use under the FDA EUA for laboratories certified under CLIA to perform moderate and/or high complexity testing. This test has not been FDA cleared or approved. A negative result does not rule out the presence of PCR inhibitors in the specimen or target RNA in concentration below the limit of  detection for the assay. If only one viral target is positive but coinfection with multiple targets is suspected, the sample should be re-tested with another FDA cleared, approved or authorized test, if coinfection would change clinical management. [facility] are certified under the Clinical Laboratory Improvement Amendments of 1988 (CLIA-88) as  qualified to perform moderate and/or high complexity laboratory testing.   XR Knee Right 3 Views    Narrative    EXAM: XR KNEE RIGHT 3 VIEWS  LOCATION: [facility]  DATE/TIME: 9/25/2022 4:16 PM    INDICATION: pain  COMPARISON: None.      Impression    IMPRESSION: Normal joint spaces and alignment. No fracture. Moderate joint effusion.   XR Chest 2 Views    Narrative    EXAM: XR CHEST 2 VIEWS  LOCATION: [facility]  DATE/TIME: 9/25/2022 4:18 PM    INDICATION: Left chest and shoulder pain  COMPARISON: None.      Impression    IMPRESSION: Cardiomediastinal silhouette is normal. Normal vasculature. Lungs and pleural spaces are clear. No fracture evident.   CBC with platelets differential    Narrative    The following orders were created for panel order CBC with platelets differential.  Procedure                               Abnormality         Status                     ---------                               -----------         ------                     CBC with platelets and d...[[id number]]  Abnormal            Final result                 Please view results for these tests on the individual orders.   Basic metabolic panel   Result Value Ref Range    Sodium 135 (L) 136 - 145 mmol/L    Potassium 3.9 3.4 - 5.3 mmol/L    Chloride 99 98 - 107 mmol/L    Carbon Dioxide (CO2) 24 22 - 29 mmol/L    Anion Gap 12 7 - 15 mmol/L    Urea Nitrogen 10.7 6.0 - 20.0 mg/dL    Creatinine 0.84 0.67 - 1.17 mg/dL    Calcium 9.3 8.6 - 10.0 mg/dL    Glucose 113 (H) 70 - 99 mg/dL    GFR Estimate >90 >60 mL/min/1.73m2   CRP  inflammation   Result Value Ref Range    CRP Inflammation 309.97 (H) <5.00 mg/L   Uric acid   Result Value Ref Range    Uric Acid 3.9 3.4 - 7.0 mg/dL   Erythrocyte sedimentation rate auto   Result Value Ref Range    Erythrocyte Sedimentation Rate 73 (H) 0 - 15 mm/hr   CBC with platelets and differential   Result Value Ref Range    WBC Count 12.5 (H) 4.0 - 11.0 10e3/uL    RBC Count 4.27 (L) 4.40 - 5.90 10e6/uL    Hemoglobin 12.5 (L) 13.3 - 17.7 g/dL    Hematocrit 37.7 (L) 40.0 - 53.0 %    MCV 88 78 - 100 fL    MCH 29.3 26.5 - 33.0 pg    MCHC 33.2 31.5 - 36.5 g/dL    RDW 13.1 10.0 - 15.0 %    Platelet Count 218 150 - 450 10e3/uL    % Neutrophils 72 %    % Lymphocytes 13 %    % Monocytes 14 %    % Eosinophils 0 %    % Basophils 0 %    % Immature Granulocytes 1 %    NRBCs per 100 WBC 0 <1 /100    Absolute Neutrophils 9.1 (H) 1.6 - 8.3 10e3/uL    Absolute Lymphocytes 1.6 0.8 - 5.3 10e3/uL    Absolute Monocytes 1.7 (H) 0.0 - 1.3 10e3/uL    Absolute Eosinophils 0.0 0.0 - 0.7 10e3/uL    Absolute Basophils 0.0 0.0 - 0.2 10e3/uL    Absolute Immature Granulocytes 0.1 <=0.4 10e3/uL    Absolute NRBCs 0.0 10e3/uL   Hepatic panel   Result Value Ref Range    Protein Total 8.1 6.4 - 8.3 g/dL    Albumin 3.8 3.5 - 5.2 g/dL    Bilirubin Total 1.3 (H) <=1.2 mg/dL    Alkaline Phosphatase 83 40 - 129 U/L    AST 27 10 - 50 U/L    ALT 38 10 - 50 U/L    Bilirubin Direct 0.50 (H) 0.00 - 0.30 mg/dL   CT Knee Right w Contrast    Narrative    EXAM: CT KNEE RIGHT W CONTRAST  LOCATION: [facility]  DATE/TIME: 9/25/2022 8:46 PM    INDICATION: Right distal femur anterior swelling and knee swelling. Rule out abscess or other abnormality.  COMPARISON: Radiographs from 9/25/2022.  TECHNIQUE: IV contrast. Axial, sagittal and coronal thin-section reconstruction. Dose reduction techniques were used.   CONTRAST: 100 mL Isovue 370.    FINDINGS:     BONES AND JOINTS:  -There is a massive knee joint effusion. No calcified  intra-articular body. No fracture. Mild osteoarthrosis of the patellofemoral joint.    SOFT TISSUES:  -No hematoma, cyst or mass. No gross muscle or tendon pathology. No evidence of abscess.      Impression    IMPRESSION:  1.  Massive knee joint effusion.  2.  Mild osteoarthrosis of the patellofemoral joint.  3.  Otherwise negative.     Cell count with differential fluid    Narrative    The following orders were created for panel order Cell count with differential fluid.  Procedure                               Abnormality         Status                     ---------                               -----------         ------                     Cell Count Body Fluid[[id number]]        Abnormal            Final result               Differential Body Fluid[[id number]]                          Final result                 Please view results for these tests on the individual orders.   Cell Count Body Fluid   Result Value Ref Range    Color Yellow Colorless, Yellow    Clarity Turbid (A) Clear    Total Nucleated Cells 353,380 /uL    Cell Count Fluid Source Knee, Right     Narrative    No reference ranges have been established.  This result  should be interpreted in the context of the patient's clinical condition and   compared to simultaneous measurement in the patient's blood.         Differential Body Fluid   Result Value Ref Range    % Neutrophils 24 %    % Lymphocytes 19 %    % Monocyte/Macrophages 57 %    Narrative    No reference ranges have been established. This result should be interpreted in the context of the patient's clinical condition and compared to simultaneous measurement in the patient's blood.   Lactic acid whole blood   Result Value Ref Range    Lactic Acid 1.0 0.7 - 2.0 mmol/L         For the majority of the shift this patient's behavior was Green     Cardiac Rhythm: Normal Sinus  Pt needs tele?no  Skin/wound Issues: None    Code Status: Full Code    Pain control: good    Nausea control: good    Abnormal  labs/tests/findings requiring intervention: none    Patient tested for COVID 19 prior to admission: YES     OBS brochure/video discussed/provided to patient/family: Yes     Family present during ED course? Yes     Family Comments/Social Situation comments: spouse went home to take Dog out, will be back    Tasks needing completion: maintenance IV needs to be hung, not compatible with Vanco    Latesha Kay, RN

## 2022-09-26 NOTE — DISCHARGE INSTRUCTIONS
Return if symptoms worsen or new symptoms develop.  Follow-up with primary care physician next available.  Drink plenty of fluids.  Take antibiotics as directed and Naprosyn as directed.  Take over-the-counter omeprazole as needed for your stomach while taking this.  If any increased redness pain swelling fevers not controlled with ibuprofen or Tylenol worsening chest wall pain or other symptoms present please return for recheck.    [facility] will call you to make an appointment with them. [phone].

## 2022-09-26 NOTE — ANESTHESIA PREPROCEDURE EVALUATION
Anesthesia Pre-Procedure Evaluation    Patient: Sanket Guerrero   MRN: [id number] : 1990        Procedure : Procedure(s):  ARTHROSCOPY, KNEE, WITH INCISION AND DRAINAGE          No past medical history on file.   No past surgical history on file.   No Known Allergies   Social History     Tobacco Use     Smoking status: Not on file     Smokeless tobacco: Not on file   Substance Use Topics     Alcohol use: Not on file      Wt Readings from Last 1 Encounters:   22 86.9 kg (191 lb 9.3 oz)        Anesthesia Evaluation   Pt has had prior anesthetic.     No history of anesthetic complications       ROS/MED HX  ENT/Pulmonary:  - neg pulmonary ROS     Neurologic:  - neg neurologic ROS     Cardiovascular:  - neg cardiovascular ROS     METS/Exercise Tolerance:     Hematologic:  - neg hematologic  ROS     Musculoskeletal:  - neg musculoskeletal ROS     GI/Hepatic:  - neg GI/hepatic ROS     Renal/Genitourinary:  - neg Renal ROS     Endo:  - neg endo ROS     Psychiatric/Substance Use:  - neg psychiatric ROS     Infectious Disease:     (+) Recent Fever,     Malignancy:       Other:  - neg other ROS          Physical Exam    Airway        Mallampati: I   TM distance: > 3 FB   Neck ROM: full   Mouth opening: > 3 cm    Respiratory Devices and Support         Dental  no notable dental history         Cardiovascular   cardiovascular exam normal          Pulmonary   pulmonary exam normal                OUTSIDE LABS:  CBC:   Lab Results   Component Value Date    WBC 11.9 (H) 2022    WBC 12.5 (H) 2022    HGB 11.8 (L) 2022    HGB 12.5 (L) 2022    HCT 34.8 (L) 2022    HCT 37.7 (L) 2022     2022     2022     BMP:   Lab Results   Component Value Date     (L) 2022     (L) 2022    POTASSIUM 4.0 2022    POTASSIUM 3.9 2022    CHLORIDE 99 2022    CHLORIDE 99 2022    CO2 26 2022    CO2 24 2022    BUN 9.6 2022     BUN 10.7 09/25/2022    CR 0.81 09/26/2022    CR 0.84 09/25/2022     (H) 09/26/2022     (H) 09/25/2022     COAGS: No results found for: PTT, INR, FIBR  POC: No results found for: BGM, HCG, HCGS  HEPATIC:   Lab Results   Component Value Date    ALBUMIN 3.8 09/25/2022    PROTTOTAL 8.1 09/25/2022    ALT 38 09/25/2022    AST 27 09/25/2022    ALKPHOS 83 09/25/2022    BILITOTAL 1.3 (H) 09/25/2022     OTHER:   Lab Results   Component Value Date    LACT 1.0 09/26/2022    JOCELYNE 8.7 09/26/2022    .97 (H) 09/25/2022    SED 73 (H) 09/25/2022       Anesthesia Plan    ASA Status:  2      Anesthesia Type: General.     - Airway: LMA   Induction: Intravenous.   Maintenance: Balanced.        Consents    Anesthesia Plan(s) and associated risks, benefits, and realistic alternatives discussed. Questions answered and patient/representative(s) expressed understanding.     - Discussed: Risks, Benefits and Alternatives for BOTH SEDATION and the PROCEDURE were discussed     - Discussed with:  Patient         Postoperative Care       PONV prophylaxis: Ondansetron (or other 5HT-3), Background Propofol Infusion     Comments:                RESHMA Hirsch CRNA

## 2022-09-26 NOTE — CONSULTS
[facility] Consultation    Consultation - [facility]  Level of consult: Consult, follow and place orders    Sanket Guerrero,  1990, MRN [id number]     Admitting Dx: Left-sided chest wall pain [R07.89]  Effusion of right knee joint [M25.461]  Febrile illness [R50.9]     PCP: No Ref-Primary, Physician, None     Code status:  Full Code     Extended Emergency Contact Information  Primary Emergency Contact: Shawna Villarreal  Home Phone: [phone]  Mobile Phone: [phone]  Relation: Significant other     Assessment:    1. Right knee septic arthritis, cultures pending with 350k nucleated cells on aspirate  2. History of right knee gout  3. Left shoulder mechanical pain s/p vomiting, unusual sleeping position, improving    Plan:  This patient was discussed with Dr.Nels Hahn, surgeon for [facility] and they are in agreement with the following plan.   Recommend arthroscopic I&D of the right knee, given the significantly high amount of nucleated cells present on aspirate, elevated inflammatory markers.  Scheduled for 1400 today with .   Discussed with pt, he is agreeable to proceeding.   Continue antibiotics per hospitalist; discussed with . Pending culture results, he may consult remotely with ID for further abx guidance.   Cultures and crystal results are pending, uric acid is normal at 3.9. No gram stain was ordered with the aspirate, this was ordered and RN will contact lab to see if it can be added on to the current aspirate. If not, will likely obtain further cultures/gram stain during surgery.   Continue NPO, ice to the right knee for comfort.     Thank you for including [facility] in the care of Sanket Guerrero. It has been a pleasure participating in his care.      Leonardo Mccormick PA-C  [facility]    Principal Problem:    Septic arthritis of knee, right (H)  Active Problems:    Left-sided chest wall pain    Effusion of right knee  joint    Febrile illness       Chief Complaint  Right knee pain and swelling     HPI  The patient is seen in orthopedic consultation at the request of Dr.Jay Braxton.  The patient is a 32 year old male with severe swelling  and pain of the right  knee. The patient reports a history of right knee gout that occurred frequently in his 20s, usually after drinking light beer (Ulices or Bud light particularly). He denies ever being on medication for this and managed it himself by avoiding these types of beer and it has become less frequent. Last Weds/Thursday he became ill with self-described food poisoning, which caused a high fever and multiple episodes of dry heaving and vomiting. Secondary to the vomiting and sleeping in an unusual position on his stomach, he developed left shoulder, clavicle and trapezius area pain. The vomiting resolved, but he noted new pain and swelling in the right knee. He did drink some alcohol over the weekend and thinks that this may have caused gout in the knee. Due to ongoing pain and swelling in the knee, he present to the [facility] ED on 9/25. The right knee was found to have a significant effusion on CT; chest xray was negative for fracture. His right knee was aspirated and 100ml of yellow, thick cloudy fluid was obtained. The initial cell count showed 350k nucleated cells, further crystals and culture pending. He was admitted for right knee septic arthritis and started on IV cefipime and vancomycin. Today he reports that his shoulder has been feeling and moving better. He was able to move the right knee a little better last night after the aspiration, but the swelling has returned and it won't move easily. He denies numbness or tingling in the right leg.     History is obtained from the patient and electronic health record     Past Medical History  No past medical history on file.    Surgical History  No past surgical history on file.     Social History  Social History      Socioeconomic History     Marital status: Single     Spouse name: Not on file     Number of children: Not on file     Years of education: Not on file     Highest education level: Not on file   Occupational History     Not on file   Tobacco Use     Smoking status: Not on file     Smokeless tobacco: Not on file   Substance and Sexual Activity     Alcohol use: Not on file     Drug use: Not on file     Sexual activity: Not on file   Other Topics Concern     Not on file   Social History Narrative     Not on file     Social Determinants of Health     Financial Resource Strain: Not on file   Food Insecurity: Not on file   Transportation Needs: Not on file   Physical Activity: Not on file   Stress: Not on file   Social Connections: Not on file   Intimate Partner Violence: Not on file   Housing Stability: Not on file       Family History  No family history on file.     Allergies:  Patient has no known allergies.      Current Medications:  Current Facility-Administered Medications   Medication     acetaminophen (TYLENOL) tablet 650 mg    Or     acetaminophen (TYLENOL) Suppository 650 mg     ceFEPIme (MAXIPIME) 2 g vial to attach to  ml bag for ADULTS or 50 ml bag for PEDS     dextrose 5% in lactated ringers infusion     HYDROmorphone (DILAUDID) injection 0.2 mg     ibuprofen (ADVIL/MOTRIN) tablet 400 mg     lidocaine (LMX4) kit     lidocaine 1 % 0.1-1 mL     melatonin tablet 1 mg     naloxone (NARCAN) injection 0.2 mg    Or     naloxone (NARCAN) injection 0.4 mg    Or     naloxone (NARCAN) injection 0.2 mg    Or     naloxone (NARCAN) injection 0.4 mg     sodium chloride (PF) 0.9% PF flush 3 mL     sodium chloride (PF) 0.9% PF flush 3 mL     vancomycin (VANCOCIN) 1,250 mg in sodium chloride 0.9 % 250 mL intermittent infusion       Review of Systems:  See H&P    Physical Exam:  Temp:  [98.7  F (37.1  C)-101.3  F (38.5  C)] 98.7  F (37.1  C)  Pulse:  [] 99  Resp:  [16-18] 18  BP: (122-157)/(67-99) 122/67  SpO2:   [96 %-100 %] 99 %    General: On examination, the patient is resting comfortably, NAD, awake and alert and oriented to person, place, time, and and general circumstances  SKIN: Right knee with 2+ effusion superiorly. No open wounds noted, single puncture from aspiration present and is closed. No obvious erythema. Mildly increased warmth.  Pulses:  Feet are warm and well perfused.   Sensation: intact and equal bilaterally to the distal upper and lower extremities.  Tenderness: TTP over the medial right knee, increased over the medial joint line.   ROM: FROM of the bilateral ankles and left knee. Right knee rests at a position of comfort at about 20 degrees of flexion, increased pain with attempted motion. Actively forward flexes the left shoulder to 90, externally rotates with mild pain.      Pertinent Labs  Lab Results: personally reviewed.  Lab Results   Component Value Date    WBC 11.9 (H) 09/26/2022    HGB 11.8 (L) 09/26/2022    HCT 34.8 (L) 09/26/2022    MCV 85 09/26/2022     09/26/2022      Latest Reference Range & Units 09/25/22 22:00   Cell Count Fluid Source  Knee, Right   Color Fluid Colorless, Yellow  Yellow   Appearance Fluid Clear  Turbid !   % Lymphocytes Fluid % 19   % Mono/Macro Fluid % 57   % Neutrophils Fluid % 24   Total Nucleated Cells /uL 353,380      Latest Reference Range & Units 09/25/22 18:54   CRP Inflammation <5.00 mg/L 309.97 (H)   (H): Data is abnormally high     Latest Reference Range & Units 09/25/22 18:54   Sed Rate 0 - 15 mm/hr 73 (H)   (H): Data is abnormally high    Pertinent Radiology  Radiology Results: images and radiology report reviewed  Recent Results (from the past 24 hour(s))   XR Knee Right 3 Views    Narrative    EXAM: XR KNEE RIGHT 3 VIEWS  LOCATION: [facility]  DATE/TIME: 9/25/2022 4:16 PM    INDICATION: pain  COMPARISON: None.      Impression    IMPRESSION: Normal joint spaces and alignment. No fracture. Moderate joint effusion.   XR  Chest 2 Views    Narrative    EXAM: XR CHEST 2 VIEWS  LOCATION: [facility]  DATE/TIME: 9/25/2022 4:18 PM    INDICATION: Left chest and shoulder pain  COMPARISON: None.      Impression    IMPRESSION: Cardiomediastinal silhouette is normal. Normal vasculature. Lungs and pleural spaces are clear. No fracture evident.   CT Knee Right w Contrast    Narrative    EXAM: CT KNEE RIGHT W CONTRAST  LOCATION: [facility]  DATE/TIME: 9/25/2022 8:46 PM    INDICATION: Right distal femur anterior swelling and knee swelling. Rule out abscess or other abnormality.  COMPARISON: Radiographs from 9/25/2022.  TECHNIQUE: IV contrast. Axial, sagittal and coronal thin-section reconstruction. Dose reduction techniques were used.   CONTRAST: 100 mL Isovue 370.    FINDINGS:     BONES AND JOINTS:  -There is a massive knee joint effusion. No calcified intra-articular body. No fracture. Mild osteoarthrosis of the patellofemoral joint.    SOFT TISSUES:  -No hematoma, cyst or mass. No gross muscle or tendon pathology. No evidence of abscess.      Impression    IMPRESSION:  1.  Massive knee joint effusion.  2.  Mild osteoarthrosis of the patellofemoral joint.  3.  Otherwise negative.         Attestation:  I have reviewed today's vital signs, notes, medications, labs and imaging.     Leonardo Mccormick PA-C

## 2022-09-26 NOTE — PROGRESS NOTES
[facility] Medicine Progress Note  Date of Service: 09/26/2022    Assessment & Plan          Suspect polyarticular arthritis, acute  Possible septic knee, right  S/p right knee washout 9/26    Left sternoclavicular joint, left shoulder and right knee inflammatory process. Right knee with 353k WBC though only 24% neutrophils and no crystals. Concern for septic knee with that WBC count. CRP also very high 310. S/p right knee washout today 9/26.    - empiric treatment with cefepime and vancomycin for now, await cultures   - check GC/Chlamydia urine PCR though appears low risk   - follow up culture on sternoclavicular joint aspiration obtained intraoperatively   - consider non-infectious cause such as reactive arthritis following GI illness last week    Patient reports recurrent episodes of monoarticular arthritis    More than year history of recurrent effusions mostly in the right knee but sometimes the left.  Patient felt these were often associated with episodes of beer drinking.  However he drinks beer frequently so difficult to say at that the really the etiology.  Usually does not have pain or as large effusions this time.  He always felt it was gout though never actually saw physician.  Would go away spontaneously.  This episode was unusual at that there is much more pain in the knee.  He has swelling also in the left shoulder this time and left sternoclavicular joint.  Patient denies risk factors for GC or chlamydia.  He and his longtime girlfriend were tested for STDs about 5 years ago and were negative and he had had joint effusions before and after that.    - check/follow CRP   - consider antiinflammatory treatment if not resolving    Diet: Advance Diet as Tolerated: Regular Diet Adult    DVT Prophylaxis: Low Risk/Ambulatory with no VTE prophylaxis indicated  T[place]illo Catheter: Not present  Central Lines: None  Code Status: Full Code           Discussion: Septic arthritis  versus inflammatory.  Patient does not appear severely ill clinically however he is young.    Disposition: Anticipate discharge in another 1 to 2 days once we get more studies back and assuming he continues to improve    Attestation:  Total time: 30 minutes    Alvaro Braxton MD       Interval History   HPI reviewed with patient.  He has had many episodes of arthritis that he felt was gout occurring mostly in the right knee but sometimes the left.  This is generally not painful and would get better when he would put a wrap on it.  He felt it often followed heavy beer drinking.  Also felt that sometimes sleeping in a poor position contributed.  He denies history of STDs and was checked along with his girlfriend about 5 years ago from was negative.  No dysuria.  Is otherwise felt well.    Physical Exam   Temp:  [98.7  F (37.1  C)-101.3  F (38.5  C)] 98.8  F (37.1  C)  Pulse:  [] 101  Resp:  [16-18] 16  BP: (121-144)/() 133/82  SpO2:  [96 %-100 %] 100 %    Weights:   Vitals:    09/25/22 1521 09/26/22 0303   Weight: 85.7 kg (189 lb) 86.9 kg (191 lb 9.3 oz)    Body mass index is 25.98 kg/m .    Constitutional: healthy appearing, thin, alert and oriented, cooperative  CV: Regular, no murmur  Respiratory: CTA bilaterally  GI: Soft, non-tender, bowel sounds normal  Skin: Warm and dry  Musculoskeletal: Right knee is currently wrapped.  There is some fullness to the left sternoclavicular joint is rather firm in nature not fluctuant.  There are some decreased range of motion of the left shoulder particularly when reaching across his body but there is no obvious effusion on exam or joint tenderness.  Other major joints appear normal    Data   Recent Labs   Lab 09/26/22  0419 09/25/22  1854   WBC 11.9* 12.5*   HGB 11.8* 12.5*   MCV 85 88    218   * 135*   POTASSIUM 4.0 3.9   CHLORIDE 99 99   CO2 26 24   BUN 9.6 10.7   CR 0.81 0.84   ANIONGAP 10 12   JOCELYNE 8.7 9.3   * 113*   ALBUMIN  --  3.8   PROTTOTAL   --  8.1   BILITOTAL  --  1.3*   ALKPHOS  --  83   ALT  --  38   AST  --  27       Recent Labs   Lab 09/26/22  0419 09/25/22  1854   * 113*        Unresulted Labs Ordered in the Past 30 Days of this Admission     Date and Time Order Name Status Description    9/26/2022  3:05 PM Synovial fluid Aerobic Bacterial Culture Routine In process     9/26/2022  3:05 PM Crystal ID Synovial Fluid In process     9/26/2022  3:05 PM Gram Stain In process     9/26/2022  3:05 PM Anaerobic Bacterial Culture Routine In process     9/26/2022  2:48 PM Synovial fluid Aerobic Bacterial Culture Routine In process     9/26/2022  2:48 PM Crystal ID Synovial Fluid In process     9/26/2022  2:48 PM Gram Stain In process     9/26/2022  2:48 PM Anaerobic Bacterial Culture Routine In process     9/26/2022 12:58 AM Blood Culture Peripheral Blood Preliminary     9/25/2022  9:57 PM Synovial fluid Aerobic Bacterial Culture Routine Preliminary     9/25/2022  6:10 PM Blood Culture Line, venous Preliminary            Imaging  Recent Results (from the past 24 hour(s))   CT Knee Right w Contrast    Narrative    EXAM: CT KNEE RIGHT W CONTRAST  LOCATION: [facility]  DATE/TIME: 9/25/2022 8:46 PM    INDICATION: Right distal femur anterior swelling and knee swelling. Rule out abscess or other abnormality.  COMPARISON: Radiographs from 9/25/2022.  TECHNIQUE: IV contrast. Axial, sagittal and coronal thin-section reconstruction. Dose reduction techniques were used.   CONTRAST: 100 mL Isovue 370.    FINDINGS:     BONES AND JOINTS:  -There is a massive knee joint effusion. No calcified intra-articular body. No fracture. Mild osteoarthrosis of the patellofemoral joint.    SOFT TISSUES:  -No hematoma, cyst or mass. No gross muscle or tendon pathology. No evidence of abscess.      Impression    IMPRESSION:  1.  Massive knee joint effusion.  2.  Mild osteoarthrosis of the patellofemoral joint.  3.  Otherwise negative.          I  reviewed all new labs and imaging results over the last 24 hours. I personally reviewed no images or EKG's today.    Medications     dextrose 5% lactated ringers 125 mL/hr at 09/26/22 0359     lactated ringers 75 mL/hr at 09/26/22 1745       [START ON 9/27/2022] acetaminophen  975 mg Oral Q8H     aspirin  81 mg Oral BID     ceFEPIme  2 g Intravenous Q8H     [START ON 9/27/2022] polyethylene glycol  17 g Oral Daily     senna-docusate  1 tablet Oral BID     sodium chloride (PF)  3 mL Intracatheter Q8H     sodium chloride (PF)  3 mL Intracatheter Q8H     vancomycin  1,250 mg Intravenous Q12H   Reviewed medication    Alvaro Braxton MD

## 2022-09-26 NOTE — PHARMACY-VANCOMYCIN DOSING SERVICE
Pharmacy Vancomycin Initial Note  Date of Service 2022  Patient's  1990  32 year old, male    Indication: Bone and Joint Infection    Current estimated CrCl = Estimated Creatinine Clearance: 151.6 mL/min (based on SCr of 0.84 mg/dL).    Creatinine for last 3 days  2022:  6:54 PM Creatinine 0.84 mg/dL    Recent Vancomycin Level(s) for last 3 days  No results found for requested labs within last 72 hours.      Vancomycin IV Administrations (past 72 hours)      No vancomycin orders with administrations in past 72 hours.                Nephrotoxins and other renal medications (From now, onward)    Start     Dose/Rate Route Frequency Ordered Stop    22 0120  vancomycin (VANCOCIN) 1,250 mg in sodium chloride 0.9 % 250 mL intermittent infusion         1,250 mg  over 90 Minutes Intravenous EVERY 12 HOURS 22 0118      22 0000  amoxicillin-clavulanate (AUGMENTIN) 875-125 MG tablet         1 tablet Oral 2 TIMES DAILY 22 0000  naproxen (NAPROSYN) 500 MG tablet         500 mg Oral 2 TIMES DAILY WITH MEALS 09/25/22 2322 10/07/22 2359          Contrast Orders - past 72 hours (72h ago, onward)    Start     Dose/Rate Route Frequency Stop    22  iopamidol (ISOVUE-370) solution 100 mL         100 mL Intravenous ONCE 22          [employer] Prediction of Planned Initial Vancomycin Regimen    Regimen: 1250 mg IV every 12 hours.  Start time: 01:18 on 2022  Exposure target: AUC24 (range)400-600 mg/L.hr   AUC24,ss: 458 mg/L.hr  Probability of AUC24 > 400: 64 %  Ctrough,ss: 13.4 mg/L  Probability of Ctrough,ss > 20: 21 %  Probability of nephrotoxicity (Lodise YUVAL ): 9 %   Plan:  1. Start vancomycin  1250 mg IV q12h.   2. Vancomycin monitoring method: AUC  3. Vancomycin therapeutic monitoring goal: 400-600 mg*h/L  4. Pharmacy will check vancomycin levels as appropriate in 1-3 Days.    5. Serum creatinine levels will be ordered daily for the  first week of therapy and at least twice weekly for subsequent weeks.      Will Sanchez, [facility]

## 2022-09-26 NOTE — PROGRESS NOTES
Patient is alert and orientated. Has not ambulated from bed this shift. Has right knee pain and swelling. Ortho consulted with him today and he is going to surgery around 1400. Tylenol and ice administered for pain.

## 2022-09-26 NOTE — ANESTHESIA CARE TRANSFER NOTE
Patient: Sanket Guerrero    Procedure: Procedure(s):  Right knee arthroscopic irrigation and debridement, partial synovectomy and left sternoclavicular joint needle aspiration       Diagnosis: Septic arthritis of knee (H) [M00.9]  Diagnosis Additional Information: No value filed.    Anesthesia Type:   General     Note:    Oropharynx: oropharynx clear of all foreign objects and spontaneously breathing  Level of Consciousness: awake  Oxygen Supplementation: room air    Independent Airway: airway patency satisfactory and stable  Dentition: dentition unchanged  Vital Signs Stable: post-procedure vital signs reviewed and stable  Report to RN Given: handoff report given  Patient transferred to: PACU    Handoff Report: Identifed the Patient, Identified the Reponsible Provider, Reviewed the pertinent medical history, Discussed the surgical course, Reviewed Intra-OP anesthesia mangement and issues during anesthesia, Set expectations for post-procedure period and Allowed opportunity for questions and acknowledgement of understanding      Vitals:  Vitals Value Taken Time   /95 09/26/22 1604   Temp     Pulse 100 09/26/22 1608   Resp 9 09/26/22 1608   SpO2 100 % 09/26/22 1608   Vitals shown include unvalidated device data.    Electronically Signed By: RESHMA Hirsch CRNA  September 26, 2022  4:09 PM

## 2022-09-26 NOTE — H&P
"[facility]    History and Physical - Hospitalist Service       Date of Admission:  9/25/2022    Assessment & Plan    right knee septic arthritis  -cont Cefepime and Vanco  -blood and synovial fluid culture pending  -Ortho to eval in AM    Gout  -uric acid 3.9     Diet: NPO for Medical/Clinical Reasons Except for: Meds    DVT Prophylaxis: scds  [place] Catheter: Not present  Central Lines: None  Code Status:  full    Clinically Significant Risk Factors Present on Admission     # Overweight: Estimated body mass index is 25.98 kg/m  as calculated from the following:    Height as of this encounter: 1.829 m (6').    Weight as of this encounter: 86.9 kg (191 lb 9.3 oz).        Disposition Plan      Expected Discharge Date: 09/28/2022      Destination: home          The patient's care was discussed with the patient.        ANTON BEAULIEU MD  [facility]  Securely message with the Vocera Web Console (learn more here)  Text page via [employer] Paging/Directory      Visit/Communication Style   Virtual (Video) communication was used to evaluate Sanket.  Sanket consented to the use of video communication: yes  Video START time: 0403, 9/26/2022  Video STOP time: 0410, 9/26/2022   Patient's location: [facility]   Provider's location during the visit: [facility] Tele-medicine site        ______________________________________________________________________    Chief Complaint   Right knee pain    History of Present Illness    32yoM with h/o gout presented to the ED with right knee pain and swelling for the last several days.  He had \" food poisoning\" on Thursday which caused a lot of vomiting.  He then developed some left shoulder pain but has since resolved.  He then developed significant pain and swelling in his right knee which became unbearable last night.  This is not the same pain as his prior flares of gout.  He had associated fever.     Review " of Systems    General: negative for sweats, weakness  Eyes: negative for blurred vision, loss of vision  Ear Nose and Throat: negative for pharyngitis, speech or swallowing difficulties  Respiratory:  negative for sputum production, wheezing, PANG, pleuritic pain, sob or cough  Cardiology:  negative for chest pain, palpitations, orthopnea, PND, edema, syncope   Gastrointestinal: negative for abdominal pain, diarrhea, constipation, hematemesis, melena or hematochezia  Genitourinary: negative for frequency, urgency, dysuria, hematuria   Neurological: negative for focal weakness, paresthesia    Past Medical History    Gout    Past Surgical History    None    Social History   Non-smoker  Minimal EtOH       Family History   DM-grandfather  Mom-healthy  Dad-HTN    Prior to Admission Medications   Prior to Admission Medications   Prescriptions Last Dose Informant Patient Reported? Taking?   cetirizine (ZYRTEC) 10 MG tablet   Yes Yes   Sig: Take 10 mg by mouth daily as needed for allergies   loratadine (CLARITIN) 10 MG tablet   Yes Yes   Sig: Take 10 mg by mouth daily as needed for allergies      Facility-Administered Medications: None     Allergies   No Known Allergies    Physical Exam   Vital Signs: Temp: (!) 100.8  F (38.2  C) Temp src: Oral BP: 137/89 Pulse: 112   Resp: 18 SpO2: 99 % O2 Device: None (Room air)    Weight: 191 lbs 9.28 oz    Gen:  Well-developed, well-nourished, in no acute distress, lying semi-supine in hospital stretcher  HEENT:  Anicteric sclera, PER, hearing intact to voice  Resp:  No accessory muscle use, breath sounds clear; no wheezes no rales no rhonchi  Card:  No murmur, normal S1, S2   Abd:  Soft per RN exam, no TTP, non-distended, normoactive bowel sounds are present  Musc:  Normal strength and movement of the major muscle groups without obvious deformity  Psych:  Good insight, oriented to person, place and time, not anxious, not agitated  Extr: right knee swollen, no erythema; warm and TTP;  effusion above patella    Data     Recent Labs   Lab 09/25/22  1854   WBC 12.5*   HGB 12.5*   MCV 88      *   POTASSIUM 3.9   CHLORIDE 99   CO2 24   BUN 10.7   CR 0.84   ANIONGAP 12   JOCELYNE 9.3   *   ALBUMIN 3.8   PROTTOTAL 8.1   BILITOTAL 1.3*   ALKPHOS 83   ALT 38   AST 27         Recent Results (from the past 24 hour(s))   XR Knee Right 3 Views    Narrative    EXAM: XR KNEE RIGHT 3 VIEWS  LOCATION: [facility]  DATE/TIME: 9/25/2022 4:16 PM    INDICATION: pain  COMPARISON: None.      Impression    IMPRESSION: Normal joint spaces and alignment. No fracture. Moderate joint effusion.   XR Chest 2 Views    Narrative    EXAM: XR CHEST 2 VIEWS  LOCATION: [facility]  DATE/TIME: 9/25/2022 4:18 PM    INDICATION: Left chest and shoulder pain  COMPARISON: None.      Impression    IMPRESSION: Cardiomediastinal silhouette is normal. Normal vasculature. Lungs and pleural spaces are clear. No fracture evident.   CT Knee Right w Contrast    Narrative    EXAM: CT KNEE RIGHT W CONTRAST  LOCATION: [facility]  DATE/TIME: 9/25/2022 8:46 PM    INDICATION: Right distal femur anterior swelling and knee swelling. Rule out abscess or other abnormality.  COMPARISON: Radiographs from 9/25/2022.  TECHNIQUE: IV contrast. Axial, sagittal and coronal thin-section reconstruction. Dose reduction techniques were used.   CONTRAST: 100 mL Isovue 370.    FINDINGS:     BONES AND JOINTS:  -There is a massive knee joint effusion. No calcified intra-articular body. No fracture. Mild osteoarthrosis of the patellofemoral joint.    SOFT TISSUES:  -No hematoma, cyst or mass. No gross muscle or tendon pathology. No evidence of abscess.      Impression    IMPRESSION:  1.  Massive knee joint effusion.  2.  Mild osteoarthrosis of the patellofemoral joint.  3.  Otherwise negative.

## 2022-09-26 NOTE — OP NOTE
Pre-operative diagnosis: Septic arthritis of knee (H) [M00.9]   Post-operative diagnosis Same as preop     Procedure:   Right knee arthroscopic irrigation and debridement, partial synovectomy and left sternoclavicular joint needle aspiration     Surgeon(s):    Jovani Hahn MD - Primary       Mr. Guerrero is a 32-year-old male with a history of gout to developed rapidly increasing pain in his right knee over the weekend with associated fevers and chills.  The right knee was aspirated and revealed over 300,000 nucleated cells.  Cultures are pending.  We discussed the risk and benefits of surgery which would include irrigation and debridement by arthroscopic measures of the right knee.  We discussed that it would be unlikely for gout present with this high of a cell count with a normal uric acid level systemically, but it is a possibility.  We will wait for cultures to help guide treatment options.  He decided to move forward with arthroscopic irrigation and debridement of the right knee.  He also developed pain in the left sternoclavicular joint so we discussed ultrasound-guided aspiration of the left sternoclavicular joint and he agreed to undergo that as well.    The patient was taken to the operating room and transferred to the operating room table.  He was placed in supine position.  He underwent general anesthetic induced by the anesthesia staff.    A surgical pause was performed to correctly identify the patient, laterality for surgery, procedure to be performed, antibiotics to be given, personnel and instrumentation present.      We began with the aspiration of the left sternoclavicular joint.  I utilized the ultrasound machine to identify the sternoclavicular joint and marked this out with a marker.  The left chest wall and clavicular area was prepped with ChloraPrep.  I then utilized an 18-gauge needle to enter the sternoclavicular joint.  I was only able to aspirate less than 1 cc of bloody appearing fluid.   A bandage was then placed.  The fluid was sent for cultures and crystal analysis, as there was not enough for cell count and differential.    We then proceeded with the right knee.  The right lower extreme was prepped and draped in standard sterile fashion after a nonsterile tourniquet had been applied to the thigh.  A superior lateral outflow cannula was placed.  Immediately upon entering the joint a large amount of yellow-colored turbid fluid was expelled.  I then proceeded with creation of the anterior lateral and anterior medial portals.  Camera was introduced via the anterior lateral portal.  A shaver was introduced.  The anterior medial portal.  The fat pad was debrided.  I spent a period of time located in the suprapatellar pouch for synovectomy.  I then visualized the medial and lateral gutters as well as the medial compartment and lateral compartment of the knee.  Diagnostic arthroscopy revealed no significant chondromalacia.  The menisci were both intact without any tears.  The cruciates were intact.  I also entered a posterior lateral compartment.  There were no loose bodies identified. There was rather significant synovitis throughout the knee.  I performed a partial synovectomy of the knee joint including an notch, suprapatellar pouch and the medial and lateral gutters.  This was performed with a shaver as well as coagulation wand.  A total of approximately 12 L of fluid was run through the knee.  A drain was placed out the superior lateral outflow portal site.  The anterior incisions were closed with 3-0 nylon sutures.  He was placed in a soft dressing with Ace wrap.  Knee was placed in immobilizer.    Postop plan:  Knee immobilizer to be worn when ambulating.  He can weight-bear as tolerated.  Drain in place and we will remove on the floor when ready.  Aspirin 81 mg for DVT prophylaxis or as medicine team prefers.  Continue antibiotics in the form of vancomycin and cefepime for now.  We have consulted  infectious disease and will tailor antibiotics accordingly.  Follow-up in clinic in 2 weeks for reevaluation.

## 2022-09-26 NOTE — PROGRESS NOTES
Patient complains of right knee pain. Eating well, oxycodone 5mg given with dinner. Vital signs stable. Family visiting. Patient in good spirits. IV infusing antibiotics without difficulty.

## 2022-09-26 NOTE — ANESTHESIA POSTPROCEDURE EVALUATION
Patient: Sanket Guerrero    Procedure: Procedure(s):  Right knee arthroscopic irrigation and debridement, partial synovectomy and left sternoclavicular joint needle aspiration       Anesthesia Type:  General    Note:  Disposition: Outpatient   Postop Pain Control: Uneventful            Sign Out: Well controlled pain   PONV: No   Neuro/Psych: Uneventful            Sign Out: Acceptable/Baseline neuro status   Airway/Respiratory: Uneventful            Sign Out: Acceptable/Baseline resp. status   CV/Hemodynamics: Uneventful            Sign Out: Acceptable CV status; No obvious hypovolemia; No obvious fluid overload   Other NRE: NONE   DID A NON-ROUTINE EVENT OCCUR? No           Last vitals:  Vitals Value Taken Time   /77 09/26/22 1614   Temp     Pulse 92 09/26/22 1620   Resp 6 09/26/22 1620   SpO2 98 % 09/26/22 1620   Vitals shown include unvalidated device data.    Electronically Signed By: RESHMA Hirsch CRNA  September 26, 2022  4:21 PM   · Last TSH was within normal limits per chart review  · Continue levothyroxine 175 mcg

## 2022-09-27 ENCOUNTER — APPOINTMENT (OUTPATIENT)
Dept: PHYSICAL THERAPY | Facility: CLINIC | Age: 32
DRG: 488 | End: 2022-09-27
Payer: COMMERCIAL

## 2022-09-27 ENCOUNTER — APPOINTMENT (OUTPATIENT)
Dept: CT IMAGING | Facility: CLINIC | Age: 32
DRG: 488 | End: 2022-09-27
Attending: INTERNAL MEDICINE
Payer: COMMERCIAL

## 2022-09-27 LAB
ALBUMIN SERPL BCG-MCNC: 2.7 G/DL (ref 3.5–5.2)
ALP SERPL-CCNC: 102 U/L (ref 40–129)
ALT SERPL W P-5'-P-CCNC: 92 U/L (ref 10–50)
ANION GAP SERPL CALCULATED.3IONS-SCNC: 9 MMOL/L (ref 7–15)
AST SERPL W P-5'-P-CCNC: 100 U/L (ref 10–50)
BASOPHILS # BLD AUTO: 0 10E3/UL (ref 0–0.2)
BASOPHILS NFR BLD AUTO: 0 %
BILIRUB SERPL-MCNC: 1.2 MG/DL
BUN SERPL-MCNC: 7.3 MG/DL (ref 6–20)
C TRACH DNA SPEC QL PROBE+SIG AMP: NEGATIVE
CALCIUM SERPL-MCNC: 8.2 MG/DL (ref 8.6–10)
CHLORIDE SERPL-SCNC: 105 MMOL/L (ref 98–107)
CREAT SERPL-MCNC: 0.87 MG/DL (ref 0.67–1.17)
CRP SERPL-MCNC: 269.53 MG/L
DEPRECATED HCO3 PLAS-SCNC: 27 MMOL/L (ref 22–29)
EOSINOPHIL # BLD AUTO: 0.1 10E3/UL (ref 0–0.7)
EOSINOPHIL NFR BLD AUTO: 1 %
ERYTHROCYTE [DISTWIDTH] IN BLOOD BY AUTOMATED COUNT: 13.2 % (ref 10–15)
GFR SERPL CREATININE-BSD FRML MDRD: >90 ML/MIN/1.73M2
GLUCOSE SERPL-MCNC: 115 MG/DL (ref 70–99)
HCT VFR BLD AUTO: 31.3 % (ref 40–53)
HGB BLD-MCNC: 10.4 G/DL (ref 13.3–17.7)
IMM GRANULOCYTES # BLD: 0 10E3/UL
IMM GRANULOCYTES NFR BLD: 1 %
LYMPHOCYTES # BLD AUTO: 2.1 10E3/UL (ref 0.8–5.3)
LYMPHOCYTES NFR BLD AUTO: 25 %
MCH RBC QN AUTO: 28.7 PG (ref 26.5–33)
MCHC RBC AUTO-ENTMCNC: 33.2 G/DL (ref 31.5–36.5)
MCV RBC AUTO: 87 FL (ref 78–100)
MONOCYTES # BLD AUTO: 1.6 10E3/UL (ref 0–1.3)
MONOCYTES NFR BLD AUTO: 20 %
MRSA DNA SPEC QL NAA+PROBE: NEGATIVE
N GONORRHOEA DNA SPEC QL NAA+PROBE: NEGATIVE
NEUTROPHILS # BLD AUTO: 4.4 10E3/UL (ref 1.6–8.3)
NEUTROPHILS NFR BLD AUTO: 53 %
NRBC # BLD AUTO: 0 10E3/UL
NRBC BLD AUTO-RTO: 0 /100
PLATELET # BLD AUTO: 280 10E3/UL (ref 150–450)
POTASSIUM SERPL-SCNC: 4.5 MMOL/L (ref 3.4–5.3)
PROT SERPL-MCNC: 6.2 G/DL (ref 6.4–8.3)
RBC # BLD AUTO: 3.62 10E6/UL (ref 4.4–5.9)
SA TARGET DNA: NEGATIVE
SODIUM SERPL-SCNC: 141 MMOL/L (ref 136–145)
WBC # BLD AUTO: 8.2 10E3/UL (ref 4–11)

## 2022-09-27 PROCEDURE — 87040 BLOOD CULTURE FOR BACTERIA: CPT | Performed by: INTERNAL MEDICINE

## 2022-09-27 PROCEDURE — 250N000011 HC RX IP 250 OP 636: Performed by: INTERNAL MEDICINE

## 2022-09-27 PROCEDURE — 250N000013 HC RX MED GY IP 250 OP 250 PS 637: Performed by: INTERNAL MEDICINE

## 2022-09-27 PROCEDURE — 36415 COLL VENOUS BLD VENIPUNCTURE: CPT | Performed by: INTERNAL MEDICINE

## 2022-09-27 PROCEDURE — 120N000001 HC R&B MED SURG/OB

## 2022-09-27 PROCEDURE — 250N000011 HC RX IP 250 OP 636: Performed by: ORTHOPAEDIC SURGERY

## 2022-09-27 PROCEDURE — 97530 THERAPEUTIC ACTIVITIES: CPT | Mod: GP

## 2022-09-27 PROCEDURE — 250N000013 HC RX MED GY IP 250 OP 250 PS 637: Performed by: ORTHOPAEDIC SURGERY

## 2022-09-27 PROCEDURE — 97161 PT EVAL LOW COMPLEX 20 MIN: CPT | Mod: GP

## 2022-09-27 PROCEDURE — 80053 COMPREHEN METABOLIC PANEL: CPT | Performed by: INTERNAL MEDICINE

## 2022-09-27 PROCEDURE — 99232 SBSQ HOSP IP/OBS MODERATE 35: CPT | Performed by: INTERNAL MEDICINE

## 2022-09-27 PROCEDURE — 250N000011 HC RX IP 250 OP 636: Performed by: EMERGENCY MEDICINE

## 2022-09-27 PROCEDURE — 85025 COMPLETE CBC W/AUTO DIFF WBC: CPT | Performed by: INTERNAL MEDICINE

## 2022-09-27 PROCEDURE — 71250 CT THORAX DX C-: CPT

## 2022-09-27 PROCEDURE — 258N000003 HC RX IP 258 OP 636: Performed by: EMERGENCY MEDICINE

## 2022-09-27 PROCEDURE — 250N000009 HC RX 250: Performed by: INTERNAL MEDICINE

## 2022-09-27 PROCEDURE — 86140 C-REACTIVE PROTEIN: CPT | Performed by: INTERNAL MEDICINE

## 2022-09-27 PROCEDURE — 87641 MR-STAPH DNA AMP PROBE: CPT | Performed by: INTERNAL MEDICINE

## 2022-09-27 RX ORDER — SODIUM CHLORIDE, SODIUM LACTATE, POTASSIUM CHLORIDE, CALCIUM CHLORIDE 600; 310; 30; 20 MG/100ML; MG/100ML; MG/100ML; MG/100ML
INJECTION, SOLUTION INTRAVENOUS CONTINUOUS
Status: DISCONTINUED | OUTPATIENT
Start: 2022-09-28 | End: 2022-09-27

## 2022-09-27 RX ORDER — SODIUM CHLORIDE, SODIUM LACTATE, POTASSIUM CHLORIDE, CALCIUM CHLORIDE 600; 310; 30; 20 MG/100ML; MG/100ML; MG/100ML; MG/100ML
INJECTION, SOLUTION INTRAVENOUS CONTINUOUS
Status: DISCONTINUED | OUTPATIENT
Start: 2022-09-28 | End: 2022-09-29

## 2022-09-27 RX ORDER — IOPAMIDOL 755 MG/ML
85 INJECTION, SOLUTION INTRAVASCULAR ONCE
Status: COMPLETED | OUTPATIENT
Start: 2022-09-27 | End: 2022-09-27

## 2022-09-27 RX ORDER — CEFTRIAXONE 2 G/1
2 INJECTION, POWDER, FOR SOLUTION INTRAMUSCULAR; INTRAVENOUS EVERY 24 HOURS
Status: DISCONTINUED | OUTPATIENT
Start: 2022-09-27 | End: 2022-10-05 | Stop reason: HOSPADM

## 2022-09-27 RX ADMIN — OXYCODONE HYDROCHLORIDE 5 MG: 5 TABLET ORAL at 08:54

## 2022-09-27 RX ADMIN — IOPAMIDOL 85 ML: 755 INJECTION, SOLUTION INTRAVENOUS at 17:57

## 2022-09-27 RX ADMIN — VANCOMYCIN HYDROCHLORIDE 1250 MG: 10 INJECTION, POWDER, LYOPHILIZED, FOR SOLUTION INTRAVENOUS at 18:49

## 2022-09-27 RX ADMIN — HYDROMORPHONE HYDROCHLORIDE 0.2 MG: 0.2 INJECTION, SOLUTION INTRAMUSCULAR; INTRAVENOUS; SUBCUTANEOUS at 06:36

## 2022-09-27 RX ADMIN — CEFEPIME 2 G: 2 INJECTION, POWDER, FOR SOLUTION INTRAVENOUS at 08:53

## 2022-09-27 RX ADMIN — POLYETHYLENE GLYCOL 3350 17 G: 17 POWDER, FOR SOLUTION ORAL at 08:55

## 2022-09-27 RX ADMIN — IBUPROFEN 400 MG: 400 TABLET ORAL at 09:16

## 2022-09-27 RX ADMIN — CEFEPIME 2 G: 2 INJECTION, POWDER, FOR SOLUTION INTRAVENOUS at 00:39

## 2022-09-27 RX ADMIN — VANCOMYCIN HYDROCHLORIDE 1250 MG: 10 INJECTION, POWDER, LYOPHILIZED, FOR SOLUTION INTRAVENOUS at 06:36

## 2022-09-27 RX ADMIN — SODIUM CHLORIDE 64 ML: 9 INJECTION, SOLUTION INTRAVENOUS at 17:58

## 2022-09-27 RX ADMIN — OXYCODONE HYDROCHLORIDE 5 MG: 5 TABLET ORAL at 13:00

## 2022-09-27 RX ADMIN — ACETAMINOPHEN 975 MG: 325 TABLET, FILM COATED ORAL at 16:04

## 2022-09-27 RX ADMIN — IBUPROFEN 400 MG: 400 TABLET ORAL at 20:46

## 2022-09-27 RX ADMIN — ACETAMINOPHEN 975 MG: 325 TABLET, FILM COATED ORAL at 00:38

## 2022-09-27 RX ADMIN — ASPIRIN 81 MG: 81 TABLET, COATED ORAL at 08:54

## 2022-09-27 RX ADMIN — OXYCODONE HYDROCHLORIDE 5 MG: 5 TABLET ORAL at 18:47

## 2022-09-27 RX ADMIN — CEFTRIAXONE SODIUM 2 G: 2 INJECTION, POWDER, FOR SOLUTION INTRAMUSCULAR; INTRAVENOUS at 14:00

## 2022-09-27 RX ADMIN — ASPIRIN 81 MG: 81 TABLET, COATED ORAL at 20:46

## 2022-09-27 RX ADMIN — ACETAMINOPHEN 650 MG: 325 TABLET, FILM COATED ORAL at 09:01

## 2022-09-27 RX ADMIN — SENNOSIDES AND DOCUSATE SODIUM 1 TABLET: 50; 8.6 TABLET ORAL at 20:46

## 2022-09-27 ASSESSMENT — ENCOUNTER SYMPTOMS
COUGH: 0
HEMATURIA: 0
DYSURIA: 0
DIARRHEA: 1
SHORTNESS OF BREATH: 0
ARTHRALGIAS: 1
ROS GI COMMENTS: RESOLVED
HEADACHES: 0
SORE THROAT: 0
COLOR CHANGE: 0
WOUND: 0
VOMITING: 1
FEVER: 1
PHOTOPHOBIA: 0
LIGHT-HEADEDNESS: 0
CHILLS: 1
ABDOMINAL PAIN: 0
WHEEZING: 0
NAUSEA: 1
FATIGUE: 1
DIZZINESS: 0

## 2022-09-27 ASSESSMENT — ACTIVITIES OF DAILY LIVING (ADL)
ADLS_ACUITY_SCORE: 38

## 2022-09-27 NOTE — PROGRESS NOTES
[facility]   Cross Cover Note  Date of Service: 9/27/2022       I was called to discuss patient with ortho svc, Dr. Hahn, regarding positive group B strep culture from left sternoclavicular joint. Needs CT of joint and plan washout tomorrow later afternoon.     Discussed second time with Dr. Hahn in conference with ID shayna consult and ID recommends echocardiogram, agrees with ceftriaxone and vancomycin for now and likely ceftriaxone will be drug of choice for completing the course.    - CT sternum SC joints this evening   - NPO after 0500   - resume LR at 75 at 0500 tomorrow   - TTE tomorrow   - will need ID referral on discharge   Alvaro Braxton MD  [facility] Medicine

## 2022-09-27 NOTE — PROGRESS NOTES
[facility] Orthopaedics Progress Note      Post-operative Day: 1 Day Post-Op    Procedure(s):  Right knee arthroscopic irrigation and debridement, partial synovectomy and left sternoclavicular joint needle aspiration  Subjective:    Pt reports ongoing pain in the right knee, it feels stiff and tight. He feels like it wants to bend but won't go back straight if he allows it to do so. He has mild pain in the left shoulder that increases with motion.    Nausea, vomiting:  No  Lightheadedness, dizziness:  No  Neuro:  Patient denies new onset numbness or paresthesias      Objective:  Blood pressure 134/78, pulse 102, temperature (!) 100.9  F (38.3  C), temperature source Oral, resp. rate 16, height 1.829 m (6'), weight 86.9 kg (191 lb 9.3 oz), SpO2 100 %.    Patient Vitals for the past 24 hrs:   BP Temp Temp src Pulse Resp SpO2   09/27/22 0729 134/78 (!) 100.9  F (38.3  C) Oral 102 16 100 %   09/27/22 0255 127/76 99.2  F (37.3  C) Oral 94 16 98 %   09/26/22 2226 115/68 98.4  F (36.9  C) Oral 92 16 100 %   09/26/22 1900 125/79 99  F (37.2  C) Oral 101 16 100 %   09/26/22 1817 133/82 98.9  F (37.2  C) Oral 101 16 100 %   09/26/22 1709 133/80 98.8  F (37.1  C) Oral 82 16 100 %   09/26/22 1630 (!) 132/94 -- -- 99 18 96 %   09/26/22 1615 121/77 -- -- 92 16 100 %   09/26/22 1604 (!) 136/95 -- -- 105 18 100 %   09/26/22 1348 (!) 140/102 99.8  F (37.7  C) Oral 100 18 98 %       Wt Readings from Last 4 Encounters:   09/26/22 86.9 kg (191 lb 9.3 oz)       Gen: A&O x 3. NAD. Appears tired. Sitting up in bed eating breakfast.  Wound status: Covered, post op dressings, Ace and knee immobilizer in place.   Circulation, motion and sensation: Dorsiflexion/plantarflexion intact and equal bilaterally; distal lower extremity sensation is intact and equal bilaterally. Foot and toes are warm and well perfused.      Pertinent Labs   Lab Results: personally reviewed.     Recent Labs   Lab Test 09/27/22  0454 09/26/22  0419 09/25/22  6538    HGB 10.4* 11.8* 12.5*   HCT 31.3* 34.8* 37.7*   MCV 87 85 88    226 218    135* 135*   .53*  --  309.97*       Plan:   Continue current cares and rehabilitation. Discussed with pt that he can remove the knee immobilizer to work on knee ROM as tolerated, it is there for support with ambulating.   Anticoagulation protocol: ASA 81mg BID x 30 days   Pain medications: Tylenol, oxycodone  Continue to monitor drainage at the Hemovac, likely continue through tomorrow.   Weight bearing status:  WBAT  Gram stain negative at the left S.C. joint; surgical cultures of that joint and the right knee pending. No growth to date on aspirate cultures.   Ongoing abx management per hospitalist.              Report completed by:  Leonardo Mccormick PA-C  Date: 9/27/2022  Time: 9:13 AM

## 2022-09-27 NOTE — PROGRESS NOTES
Patient eating and drinking well. Voiding without problem. Stool softener given this AM. Ambulating to bathroom with stand by assist and walker without difficulty. Pain being controlled by tylenol, ibu and 5 mg Oxy twice this shift.

## 2022-09-27 NOTE — PROGRESS NOTES
09/27/22 0940   Quick Adds   Type of Visit Initial PT Evaluation   Living Environment   People in Home significant other   Current Living Arrangements house   Home Accessibility stairs to enter home   Number of Stairs, Main Entrance 3   Stair Railings, Main Entrance railings safe and in good condition   Self-Care   Equipment Currently Used at Home none   Fall history within last six months no   Activity/Exercise/Self-Care Comment Pt reports has had this episode before and is able to use cane or his dad's crutches for mobility.   General Information   Onset of Illness/Injury or Date of Surgery 09/25/22   Referring Physician Leonardo Mccormick PA-C   Patient/Family Therapy Goals Statement (PT) Return home with less pain   Pertinent History of Current Problem (include personal factors and/or comorbidities that impact the POC) 32 y.o. male admitted 9/25 with R knee pain. Suspect polyarticular arthritis, acute Possible septic knee, right  S/p right knee washout 9/26. Now WBAT with knee immobilizer when up   Weight-Bearing Status - RLE weight-bearing as tolerated   Cognition   Affect/Mental Status (Cognition) WFL   Orientation Status (Cognition) oriented x 4   Follows Commands (Cognition) WFL   Pain Assessment   Patient Currently in Pain Yes, see Vital Sign flowsheet  (R knee)   Range of Motion (ROM)   Range of Motion ROM deficits secondary to pain   ROM Comment Pain with R knee ROM, instructed OK to remove immobilizer and complete R knee ROM   Strength (Manual Muscle Testing)   Strength Comments Appears functional, limited due to pain   Bed Mobility   Comment, (Bed Mobility) supine<>sit with support from fiance to complete   Transfers   Comment, (Transfers) sit<>stand from EOB with 2WW and supervision, extends RLE in front of him due to increased pain and knee immobilizer on   Gait/Stairs (Locomotion)   Simms Level (Gait) supervision   Assistive Device (Gait) walker, front-wheeled   Distance in Feet (Required  for LE Total Joints) 50   Pattern (Gait) step-through   Deviations/Abnormal Patterns (Gait) antalgic;gait speed decreased   Comment, (Gait/Stairs) Knee immobilizer donned for ambulation   Clinical Impression   Criteria for Skilled Therapeutic Intervention Yes, treatment indicated   PT Diagnosis (PT) impaired mobility s/p R knee washout   Influenced by the following impairments pain, reduced ROM, LE weakness   Functional limitations due to impairments impaired transfers, gait   Clinical Presentation (PT Evaluation Complexity) Stable/Uncomplicated   Clinical Presentation Rationale clinical reasoning and chart review   Clinical Decision Making (Complexity) low complexity   Planned Therapy Interventions (PT) bed mobility training;cryotherapy;gait training;home exercise program;patient/family education;ROM (range of motion);strengthening;transfer training   Anticipated Equipment Needs at Discharge (PT)   (may need cructches pending pain management and progress)   Risk & Benefits of therapy have been explained evaluation/treatment results reviewed;care plan/treatment goals reviewed;risks/benefits reviewed;current/potential barriers reviewed;participants voiced agreement with care plan;participants included;patient;spouse/significant other   PT Discharge Planning   PT Discharge Recommendation (DC Rec) home with assist   PT Rationale for DC Rec Has good support from sandra, no further PT needs as pt reports has dealt with this before and has access to crutches or cane if needed   PT Brief overview of current status Supervision with 2WW   Total Evaluation Time   Total Evaluation Time (Minutes) 10   Physical Therapy Goals   PT Frequency One time eval and treatment only   PT Predicted Duration/Target Date for Goal Attainment 09/27/22   PT Goals Gait;Transfers   PT: Transfers Supervision/stand-by assist;Sit to/from stand;Within precautions;Goal Met   PT: Gait Supervision/stand-by assist;Standard walker;50 feet;Goal Met

## 2022-09-27 NOTE — PLAN OF CARE
Physical Therapy Discharge Summary    Reason for therapy discharge:    All goals and outcomes met, no further needs identified.    Progress towards therapy goal(s). See goals on Care Plan in Epic electronic health record for goal details.  Goals met    Therapy recommendation(s):    No further therapy is recommended. Encouraged continued mobility as tolerated while remained hospitalized.

## 2022-09-27 NOTE — PROGRESS NOTES
Progress note:    I met with the patient this afternoon and discussed the results of his joint aspiration cultures.  He is now growing group B strep from both the right knee aspirate as well as the left sternoclavicular joint aspirate.  I discussed his case with Dr. Braxton, our hospitalist, and discussed plan moving forward.  I would like to obtain a CT scan of the left sternoclavicular joint to look for any surrounding abscess.  We will also make him n.p.o. after 0500 tomorrow.   Spoke with Dr. Umanzor, of our [facility] infectious disease team, and informed her of the culture results.  She would like to obtain a TTE tomorrow as well.  Discussed antibiotics in the form of ceftriaxone and vancomycin for now and likely ceftriaxone for completing the course.    Jovani Hahn MD  Orthopedic Surgery

## 2022-09-27 NOTE — PROGRESS NOTES
Paged Dr. Fuentes with update regarding call from micro lab. 9/26 synovial sternum and right knee both growing group B strep.

## 2022-09-27 NOTE — PROGRESS NOTES
"Infectious Disease Curbside Note  I was called by Dr Hahn and Dr Fuentes on 9/27/2022 at 2 different times to provide input for Sanket Guerrero MRN [id number]. The patient is located at distant hospital-[facility].. The nature of this request for a curbside consultation does not permit me to perform a comprehensive review of health care records, patient/family interview, nor an examination of the patient. I obtained limited patient information from the provider on the phone call and a limited chart review.    Sanket Guerrero is a 33 yo male with hx of gout had GI symptoms \"food poisoning\" with a alot of vomiting last Thursday and a few days later developed acute swelling and pain in right knee and left sternocalvicular joint. He was admitted to [facility] on 9/25/22.  T max was 101.3F on 9/25/22  and 100.9 F on 9/27/22 .     CT  right knee with contrast on 9/25/22 showed Massive knee joint effusion.  Mild osteoarthrosis of the patellofemoral joint.  CXR 9/25/22: Cardiomediastinal silhouette is normal. Normal vasculature. Lungs and pleural spaces are clear. No fracture evident    CRP was ~310 on 9/25 --> 269 on 9/27/22  uric acid 3.9   WBC 12.5 on 9/25/22  right knee fluid 9/25/22- turbid, 353K WBC , 24% neut, 19% lymp, 57% mono  Chlamydia and Neisseria PCR screen neg    ABX: started on Cefepime at 0132 and Vancomycin IV at 0400 on 9/26    He underwent Right knee arthroscopic irrigation and debridement, partial synovectomy and left sternoclavicular joint needle aspiration on 9/26/22 at 1440 . per op note,  \"Immediately upon entering the (right knee) joint a large amount of yellow-colored turbid fluid was expelled\"     Right knee synovial fluid on 9/25/22 is now growing 1 + Strep agalactiae.  intra-op fluid right knee fluid culture 9/26/22 - neg so far   sternoclavicular joint fluid cx 9/26- is now growing 1+ Strep agalactiae   Blood cx - neg on 9/25, 9/26 neg so far     ( when I spoke with Dr Fuentes this morning all " cultures were still negative)   - I asked for Lyme PCR from knee fluid when I spoke with Dr Fuentes     Based on only the information I was provided today, I make the following recommendations to the treating provider/team for their review and consideration:     - I was told patient is otherwise doing well , afebrile. vitals table.     Impression :  - Right septic knee ( Strep agalactiae)  - left sternoclavicular septic joint ( Strep agalactiae)     Plan/Recommendations:  - follow-up cultures - blood and intraop cultures   - echocardiogram TTE  to evaluate for endocarditis due to 2 different sites of infection   - monitor for other joint infection   - Urine analysis, Urine culture if indicated    - trend CRP every ~2-3 days while in hospital     - per Dr Hahn, will plan on another I+D of right knee joint   - agree with CT sternoclavicular joint to evaluate for abscess /bone infection     - will need to find out if there are any IV injections/ joint injections   - please take photos of the left Streno clav joint and right knee and place in his chart     - Can stop Cefepime and start Ceftriaxone 2 gram IV daily. If no other bacteria isolated other than the Strep agalactiae, can stop Vancomycin IV ( when cultures are finalized)     - will need IV antibiotic/s after discharge  - NO PICC line until blood cxs are negative x 72 hrs   - Please call to Update in 1-2 days and before discharge from hospital   - will need ID follow-up after discharge from hospital     These recommendations are not intended to take the place of the care team's clinical judgement, which should always be utilized to provide the most appropriate care to meet the unique needs of each patient. The recommendations offered were based on the limited scope of information provided as today's date. Should additional guidance be needed or required a formal consultation with infectious diseases is recommended.    *If a patient is discharged on IV  antibiotics it is not the responsibility of the ID curbside provider to monitor labs or sign for antibiotic orders unless it is otherwise stated. If further outpatient management is required then an outpatient ID consult should be placed.     Andrés Guthrie MD,M.Med.Sc.  Infectious Diseases  Pager: [phone]

## 2022-09-27 NOTE — PROGRESS NOTES
Time: Assumed care of patient from [id number]      Reason for Admission: Septic Arthritis of right knee      Activity: assist x1 with walker to bathroom      Neuro: alert and oriented, able to make needs known, uses call light appropriately      GI/:  continent of bladder and bowel, attempted to have BM but not successful      Skin: skin intact, output of drain was 90cc bright red/bloody      Diet: Regular diet, family supplied fresh fruits in room      Lines/Drains: PIV patent and infusing NS at 75/hr, knee drain patent and draining well      Vitals: /76 (BP Location: Left arm)   Pulse 94   Temp 99.2  F (37.3  C) (Oral)   Resp 16   Ht 1.829 m (6')   Wt 86.9 kg (191 lb 9.3 oz)   SpO2 98%   BMI 25.98 kg/m        Pain: pain is tolerable while in bed 1-2/10, ambulating is not too bad per patient      Plan: possible discharge in 1-2 days after labs come back      Lab Results: hemoglobin 10.4, albumin 2.7, ALT 92, , .53 (which is better than 2 days prior at 309.97)    Annita Zhou RN on 9/27/2022 at 5:55 AM

## 2022-09-27 NOTE — PROGRESS NOTES
[facility]    Hospitalist Progress Note    Date of Service (when I saw the patient): 09/27/2022    Assessment & Plan   Sanket Guerrero is a 32 year old male who was admitted on 9/25/2022 with several days of pain and swelling of the right knee.    Suspect polyarticular arthritis, acute  Possible septic knee, right  S/p right knee washout 9/26    Left sternoclavicular joint, left shoulder and right knee inflammatory process. Right knee with 353k WBC though only 24% neutrophils and no crystals. Concern for septic knee with that WBC count. CRP also very high 310. S/p right knee washout today 9/26.    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  Patient felt these were often associated with episodes of beer drinking.  However he drinks beer frequently so difficult to say at that the really the etiology.  Usually does not have pain or as large effusions this time.  He always felt it was gout though never actually saw physician.  Would go away spontaneously.  This episode was unusual at that there is much more pain in the knee.  He has swelling also in the left shoulder this time and left sternoclavicular joint.  Patient denies risk factors for GC or chlamydia.  He and his longtime girlfriend were tested for STDs about 5 years ago and were negative and he had had joint effusions before and after that.   - CRP improving on antibiotics   - Consider antiinflammatory treatment if not resolving   - Referral to  rheumatology at discharge for recurrent arthritis unless clearly infectious     Diet: Advance Diet as Tolerated: Regular Diet Adult    DVT Prophylaxis: Low Risk/Ambulatory with no VTE prophylaxis indicated  Trujillo Catheter: Not present  Central Lines: None  Code Status: Full Code      Disposition: Expected discharge in days once .    Tano Fuentes MD    Interval History   The patient states that he feels the desire to bend his right knee, despite immobilizer.  Denies fevers.  Pain controlled.    -Data reviewed today: I reviewed all new labs and imaging results over the last 24 hours. I personally reviewed no images or EKG's today.    Physical Exam   Temp: 98.3  F (36.8  C) Temp src: Oral BP: 134/78 Pulse: 102   Resp: 16 SpO2: 100 % O2 Device: None (Room air)    Vitals:    09/25/22 1521 09/26/22 0303   Weight: 85.7 kg (189 lb) 86.9 kg (191 lb 9.3 oz)     Vital Signs with Ranges  Temp:  [98.3  F (36.8  C)-100.9  F (38.3  C)] 98.3  F (36.8  C)  Pulse:  [] 102  Resp:  [16-18] 16  BP: (115-140)/() 134/78  SpO2:  [96 %-100 %] 100 %  I/O last 3 completed shifts:  In: 1500 [P.O.:300; I.V.:1200]  Out: 2340 [Urine:2250; Drains:90]    Gen: Well nourished, well developed, alert and oriented x 3, no acute distressed  HEENT: Atraumatic, normocephalic; sclera non-injected, anicterric; oral mucosa moist, no lesion, no exudate  Lungs: Clear to ausculation, no wheezes, no rhonchi, no rales  Heart: Regular rate, regular rhythm, no gallops, no rubs, no murmurs  GI: Bowel sound normal, no hepatosplenomegaly, no masses, non-tender, non-distended, no guarding, no rebound tenderness  Lymph: No lymphadenopathy, no edema  Skin: No rashes, no chronic venous stasis, R knee in immobilizer    Medications     lactated ringers 75 mL/hr at 09/26/22 1745       acetaminophen  975 mg Oral Q8H     aspirin  81 mg Oral BID     cefTRIAXone  2 g Intravenous Q24H     polyethylene glycol  17 g Oral Daily     senna-docusate  1 tablet Oral BID      sodium chloride (PF)  3 mL Intracatheter Q8H     vancomycin  1,250 mg Intravenous Q12H       Data   Recent Labs   Lab 09/27/22  0454 09/26/22  0419 09/25/22  1854   WBC 8.2 11.9* 12.5*   HGB 10.4* 11.8* 12.5*   MCV 87 85 88    226 218    135* 135*   POTASSIUM 4.5 4.0 3.9   CHLORIDE 105 99 99   CO2 27 26 24   BUN 7.3 9.6 10.7   CR 0.87 0.81 0.84   ANIONGAP 9 10 12   JOCELYNE 8.2* 8.7 9.3   * 126* 113*   ALBUMIN 2.7*  --  3.8   PROTTOTAL 6.2*  --  8.1   BILITOTAL 1.2  --  1.3*   ALKPHOS 102  --  83   ALT 92*  --  38   *  --  27       No results found for this or any previous visit (from the past 24 hour(s)).

## 2022-09-28 ENCOUNTER — HOME INFUSION (PRE-WILLOW HOME INFUSION) (OUTPATIENT)
Dept: PHARMACY | Facility: CLINIC | Age: 32
End: 2022-09-28

## 2022-09-28 ENCOUNTER — ANESTHESIA (OUTPATIENT)
Dept: SURGERY | Facility: CLINIC | Age: 32
DRG: 488 | End: 2022-09-28
Payer: COMMERCIAL

## 2022-09-28 ENCOUNTER — APPOINTMENT (OUTPATIENT)
Dept: CARDIOLOGY | Facility: CLINIC | Age: 32
DRG: 488 | End: 2022-09-28
Attending: INTERNAL MEDICINE
Payer: COMMERCIAL

## 2022-09-28 ENCOUNTER — ANESTHESIA EVENT (OUTPATIENT)
Dept: SURGERY | Facility: CLINIC | Age: 32
DRG: 488 | End: 2022-09-28
Payer: COMMERCIAL

## 2022-09-28 LAB
ALBUMIN SERPL BCG-MCNC: 3 G/DL (ref 3.5–5.2)
ALP SERPL-CCNC: 132 U/L (ref 40–129)
ALT SERPL W P-5'-P-CCNC: 86 U/L (ref 10–50)
AMPHETAMINES UR QL SCN: NORMAL
ANION GAP SERPL CALCULATED.3IONS-SCNC: 10 MMOL/L (ref 7–15)
AST SERPL W P-5'-P-CCNC: 60 U/L (ref 10–50)
BACTERIA SNV CULT: ABNORMAL
BARBITURATES UR QL SCN: NORMAL
BENZODIAZ UR QL SCN: NORMAL
BILIRUB SERPL-MCNC: 0.7 MG/DL
BUN SERPL-MCNC: 5.2 MG/DL (ref 6–20)
BZE UR QL SCN: NORMAL
CALCIUM SERPL-MCNC: 8.8 MG/DL (ref 8.6–10)
CANNABINOIDS UR QL SCN: NORMAL
CHLORIDE SERPL-SCNC: 103 MMOL/L (ref 98–107)
CREAT SERPL-MCNC: 0.81 MG/DL (ref 0.67–1.17)
CRP SERPL-MCNC: 216.87 MG/L
DEPRECATED HCO3 PLAS-SCNC: 28 MMOL/L (ref 22–29)
ERYTHROCYTE [DISTWIDTH] IN BLOOD BY AUTOMATED COUNT: 13.2 % (ref 10–15)
GFR SERPL CREATININE-BSD FRML MDRD: >90 ML/MIN/1.73M2
GLUCOSE SERPL-MCNC: 113 MG/DL (ref 70–99)
HCT VFR BLD AUTO: 32.2 % (ref 40–53)
HGB BLD-MCNC: 10.6 G/DL (ref 13.3–17.7)
HOLD SPECIMEN: NORMAL
LVEF ECHO: NORMAL
MCH RBC QN AUTO: 28.6 PG (ref 26.5–33)
MCHC RBC AUTO-ENTMCNC: 32.9 G/DL (ref 31.5–36.5)
MCV RBC AUTO: 87 FL (ref 78–100)
OPIATES UR QL SCN: NORMAL
PCP QUAL URINE (ROCHE): NORMAL
PLAT MORPH BLD: NORMAL
PLATELET # BLD AUTO: 412 10E3/UL (ref 150–450)
POTASSIUM SERPL-SCNC: 4.5 MMOL/L (ref 3.4–5.3)
PROT SERPL-MCNC: 6.8 G/DL (ref 6.4–8.3)
RBC # BLD AUTO: 3.71 10E6/UL (ref 4.4–5.9)
RBC MORPH BLD: NORMAL
SODIUM SERPL-SCNC: 141 MMOL/L (ref 136–145)
VANCOMYCIN SERPL-MCNC: <4 UG/ML
WBC # BLD AUTO: 8.2 10E3/UL (ref 4–11)

## 2022-09-28 PROCEDURE — 250N000011 HC RX IP 250 OP 636: Performed by: NURSE ANESTHETIST, CERTIFIED REGISTERED

## 2022-09-28 PROCEDURE — 250N000025 HC SEVOFLURANE, PER MIN: Performed by: ORTHOPAEDIC SURGERY

## 2022-09-28 PROCEDURE — 258N000003 HC RX IP 258 OP 636: Performed by: INTERNAL MEDICINE

## 2022-09-28 PROCEDURE — 0R9F0ZZ DRAINAGE OF LEFT STERNOCLAVICULAR JOINT, OPEN APPROACH: ICD-10-PCS | Performed by: ORTHOPAEDIC SURGERY

## 2022-09-28 PROCEDURE — 258N000003 HC RX IP 258 OP 636: Performed by: NURSE ANESTHETIST, CERTIFIED REGISTERED

## 2022-09-28 PROCEDURE — 999N000141 HC STATISTIC PRE-PROCEDURE NURSING ASSESSMENT: Performed by: ORTHOPAEDIC SURGERY

## 2022-09-28 PROCEDURE — 93306 TTE W/DOPPLER COMPLETE: CPT

## 2022-09-28 PROCEDURE — 250N000009 HC RX 250: Performed by: NURSE ANESTHETIST, CERTIFIED REGISTERED

## 2022-09-28 PROCEDURE — 250N000009 HC RX 250: Performed by: ORTHOPAEDIC SURGERY

## 2022-09-28 PROCEDURE — 87040 BLOOD CULTURE FOR BACTERIA: CPT | Performed by: INTERNAL MEDICINE

## 2022-09-28 PROCEDURE — 360N000076 HC SURGERY LEVEL 3, PER MIN: Performed by: ORTHOPAEDIC SURGERY

## 2022-09-28 PROCEDURE — 250N000013 HC RX MED GY IP 250 OP 250 PS 637: Performed by: ORTHOPAEDIC SURGERY

## 2022-09-28 PROCEDURE — 370N000017 HC ANESTHESIA TECHNICAL FEE, PER MIN: Performed by: ORTHOPAEDIC SURGERY

## 2022-09-28 PROCEDURE — 120N000001 HC R&B MED SURG/OB

## 2022-09-28 PROCEDURE — 85027 COMPLETE CBC AUTOMATED: CPT | Performed by: INTERNAL MEDICINE

## 2022-09-28 PROCEDURE — 99232 SBSQ HOSP IP/OBS MODERATE 35: CPT | Performed by: INTERNAL MEDICINE

## 2022-09-28 PROCEDURE — 80307 DRUG TEST PRSMV CHEM ANLYZR: CPT | Performed by: INTERNAL MEDICINE

## 2022-09-28 PROCEDURE — 80053 COMPREHEN METABOLIC PANEL: CPT | Performed by: INTERNAL MEDICINE

## 2022-09-28 PROCEDURE — 258N000001 HC RX 258: Performed by: ORTHOPAEDIC SURGERY

## 2022-09-28 PROCEDURE — 80202 ASSAY OF VANCOMYCIN: CPT | Performed by: INTERNAL MEDICINE

## 2022-09-28 PROCEDURE — 36415 COLL VENOUS BLD VENIPUNCTURE: CPT | Performed by: INTERNAL MEDICINE

## 2022-09-28 PROCEDURE — 93306 TTE W/DOPPLER COMPLETE: CPT | Mod: 26 | Performed by: INTERNAL MEDICINE

## 2022-09-28 PROCEDURE — 86140 C-REACTIVE PROTEIN: CPT | Performed by: INTERNAL MEDICINE

## 2022-09-28 PROCEDURE — 258N000003 HC RX IP 258 OP 636: Performed by: EMERGENCY MEDICINE

## 2022-09-28 PROCEDURE — 710N000009 HC RECOVERY PHASE 1, LEVEL 1, PER MIN: Performed by: ORTHOPAEDIC SURGERY

## 2022-09-28 PROCEDURE — 272N000001 HC OR GENERAL SUPPLY STERILE: Performed by: ORTHOPAEDIC SURGERY

## 2022-09-28 PROCEDURE — 250N000011 HC RX IP 250 OP 636: Performed by: EMERGENCY MEDICINE

## 2022-09-28 PROCEDURE — 250N000011 HC RX IP 250 OP 636: Performed by: INTERNAL MEDICINE

## 2022-09-28 PROCEDURE — 250N000013 HC RX MED GY IP 250 OP 250 PS 637: Performed by: NURSE ANESTHETIST, CERTIFIED REGISTERED

## 2022-09-28 PROCEDURE — 0SCC0ZZ EXTIRPATION OF MATTER FROM RIGHT KNEE JOINT, OPEN APPROACH: ICD-10-PCS | Performed by: ORTHOPAEDIC SURGERY

## 2022-09-28 RX ORDER — HYDROMORPHONE HCL IN WATER/PF 6 MG/30 ML
0.4 PATIENT CONTROLLED ANALGESIA SYRINGE INTRAVENOUS EVERY 5 MIN PRN
Status: DISCONTINUED | OUTPATIENT
Start: 2022-09-28 | End: 2022-09-28 | Stop reason: HOSPADM

## 2022-09-28 RX ORDER — ONDANSETRON 2 MG/ML
INJECTION INTRAMUSCULAR; INTRAVENOUS PRN
Status: DISCONTINUED | OUTPATIENT
Start: 2022-09-28 | End: 2022-09-28

## 2022-09-28 RX ORDER — CEFAZOLIN SODIUM 1 G/50ML
1250 SOLUTION INTRAVENOUS EVERY 12 HOURS
Status: DISCONTINUED | OUTPATIENT
Start: 2022-09-28 | End: 2022-09-28

## 2022-09-28 RX ORDER — KETAMINE HYDROCHLORIDE 10 MG/ML
INJECTION INTRAMUSCULAR; INTRAVENOUS PRN
Status: DISCONTINUED | OUTPATIENT
Start: 2022-09-28 | End: 2022-09-28

## 2022-09-28 RX ORDER — MAGNESIUM HYDROXIDE 1200 MG/15ML
LIQUID ORAL PRN
Status: DISCONTINUED | OUTPATIENT
Start: 2022-09-28 | End: 2022-09-28 | Stop reason: HOSPADM

## 2022-09-28 RX ORDER — SODIUM CHLORIDE, SODIUM LACTATE, POTASSIUM CHLORIDE, CALCIUM CHLORIDE 600; 310; 30; 20 MG/100ML; MG/100ML; MG/100ML; MG/100ML
INJECTION, SOLUTION INTRAVENOUS CONTINUOUS
Status: DISCONTINUED | OUTPATIENT
Start: 2022-09-28 | End: 2022-09-28 | Stop reason: HOSPADM

## 2022-09-28 RX ORDER — FENTANYL CITRATE 50 UG/ML
INJECTION, SOLUTION INTRAMUSCULAR; INTRAVENOUS PRN
Status: DISCONTINUED | OUTPATIENT
Start: 2022-09-28 | End: 2022-09-28

## 2022-09-28 RX ORDER — CEFAZOLIN SODIUM 1 G/50ML
1250 SOLUTION INTRAVENOUS EVERY 8 HOURS
Status: DISCONTINUED | OUTPATIENT
Start: 2022-09-29 | End: 2022-09-29

## 2022-09-28 RX ORDER — MEPERIDINE HYDROCHLORIDE 25 MG/ML
25 INJECTION INTRAMUSCULAR; INTRAVENOUS; SUBCUTANEOUS ONCE
Status: COMPLETED | OUTPATIENT
Start: 2022-09-28 | End: 2022-09-28

## 2022-09-28 RX ORDER — PROPOFOL 10 MG/ML
INJECTION, EMULSION INTRAVENOUS PRN
Status: DISCONTINUED | OUTPATIENT
Start: 2022-09-28 | End: 2022-09-28

## 2022-09-28 RX ORDER — ONDANSETRON 4 MG/1
4 TABLET, ORALLY DISINTEGRATING ORAL EVERY 30 MIN PRN
Status: DISCONTINUED | OUTPATIENT
Start: 2022-09-28 | End: 2022-09-28 | Stop reason: HOSPADM

## 2022-09-28 RX ORDER — OXYCODONE HYDROCHLORIDE 5 MG/1
5 TABLET ORAL EVERY 4 HOURS PRN
Status: DISCONTINUED | OUTPATIENT
Start: 2022-09-28 | End: 2022-09-28 | Stop reason: HOSPADM

## 2022-09-28 RX ORDER — FENTANYL CITRATE 50 UG/ML
50 INJECTION, SOLUTION INTRAMUSCULAR; INTRAVENOUS EVERY 5 MIN PRN
Status: DISCONTINUED | OUTPATIENT
Start: 2022-09-28 | End: 2022-09-28 | Stop reason: HOSPADM

## 2022-09-28 RX ORDER — ONDANSETRON 2 MG/ML
4 INJECTION INTRAMUSCULAR; INTRAVENOUS EVERY 30 MIN PRN
Status: DISCONTINUED | OUTPATIENT
Start: 2022-09-28 | End: 2022-09-28 | Stop reason: HOSPADM

## 2022-09-28 RX ORDER — HYDROXYZINE HYDROCHLORIDE 25 MG/1
25 TABLET, FILM COATED ORAL EVERY 6 HOURS PRN
Status: DISCONTINUED | OUTPATIENT
Start: 2022-09-28 | End: 2022-09-28 | Stop reason: HOSPADM

## 2022-09-28 RX ADMIN — SENNOSIDES AND DOCUSATE SODIUM 1 TABLET: 50; 8.6 TABLET ORAL at 09:05

## 2022-09-28 RX ADMIN — ACETAMINOPHEN 975 MG: 325 TABLET, FILM COATED ORAL at 09:05

## 2022-09-28 RX ADMIN — FENTANYL CITRATE 50 MCG: 50 INJECTION, SOLUTION INTRAMUSCULAR; INTRAVENOUS at 19:46

## 2022-09-28 RX ADMIN — OXYCODONE HYDROCHLORIDE 10 MG: 5 TABLET ORAL at 22:37

## 2022-09-28 RX ADMIN — ACETAMINOPHEN 975 MG: 325 TABLET, FILM COATED ORAL at 00:54

## 2022-09-28 RX ADMIN — PROPOFOL 300 MG: 10 INJECTION, EMULSION INTRAVENOUS at 17:05

## 2022-09-28 RX ADMIN — ONDANSETRON 4 MG: 2 INJECTION INTRAMUSCULAR; INTRAVENOUS at 17:21

## 2022-09-28 RX ADMIN — HYDROMORPHONE HYDROCHLORIDE 1 MG: 1 INJECTION, SOLUTION INTRAMUSCULAR; INTRAVENOUS; SUBCUTANEOUS at 17:10

## 2022-09-28 RX ADMIN — SODIUM CHLORIDE, POTASSIUM CHLORIDE, SODIUM LACTATE AND CALCIUM CHLORIDE: 600; 310; 30; 20 INJECTION, SOLUTION INTRAVENOUS at 19:43

## 2022-09-28 RX ADMIN — VANCOMYCIN HYDROCHLORIDE 1250 MG: 10 INJECTION, POWDER, LYOPHILIZED, FOR SOLUTION INTRAVENOUS at 05:21

## 2022-09-28 RX ADMIN — MEPERIDINE HYDROCHLORIDE 25 MG: 25 INJECTION, SOLUTION INTRAMUSCULAR; INTRAVENOUS; SUBCUTANEOUS at 19:37

## 2022-09-28 RX ADMIN — HYDROMORPHONE HYDROCHLORIDE 0.4 MG: 0.2 INJECTION, SOLUTION INTRAMUSCULAR; INTRAVENOUS; SUBCUTANEOUS at 19:53

## 2022-09-28 RX ADMIN — SODIUM CHLORIDE, POTASSIUM CHLORIDE, SODIUM LACTATE AND CALCIUM CHLORIDE: 600; 310; 30; 20 INJECTION, SOLUTION INTRAVENOUS at 05:21

## 2022-09-28 RX ADMIN — OXYCODONE HYDROCHLORIDE 10 MG: 5 TABLET ORAL at 09:12

## 2022-09-28 RX ADMIN — OXYCODONE HYDROCHLORIDE 5 MG: 5 TABLET ORAL at 20:31

## 2022-09-28 RX ADMIN — MIDAZOLAM 2 MG: 1 INJECTION INTRAMUSCULAR; INTRAVENOUS at 17:00

## 2022-09-28 RX ADMIN — HYDROMORPHONE HYDROCHLORIDE 0.4 MG: 0.2 INJECTION, SOLUTION INTRAMUSCULAR; INTRAVENOUS; SUBCUTANEOUS at 20:06

## 2022-09-28 RX ADMIN — FENTANYL CITRATE 50 MCG: 50 INJECTION, SOLUTION INTRAMUSCULAR; INTRAVENOUS at 19:33

## 2022-09-28 RX ADMIN — OXYCODONE HYDROCHLORIDE 10 MG: 5 TABLET ORAL at 13:41

## 2022-09-28 RX ADMIN — FENTANYL CITRATE 100 MCG: 50 INJECTION, SOLUTION INTRAMUSCULAR; INTRAVENOUS at 17:36

## 2022-09-28 RX ADMIN — OXYCODONE HYDROCHLORIDE 10 MG: 5 TABLET ORAL at 05:19

## 2022-09-28 RX ADMIN — VANCOMYCIN HYDROCHLORIDE 1250 MG: 10 INJECTION, POWDER, LYOPHILIZED, FOR SOLUTION INTRAVENOUS at 22:37

## 2022-09-28 RX ADMIN — ACETAMINOPHEN 975 MG: 325 TABLET, FILM COATED ORAL at 15:56

## 2022-09-28 RX ADMIN — HYDROMORPHONE HYDROCHLORIDE 0.4 MG: 0.2 INJECTION, SOLUTION INTRAMUSCULAR; INTRAVENOUS; SUBCUTANEOUS at 20:01

## 2022-09-28 RX ADMIN — KETAMINE HYDROCHLORIDE 20 MG: 10 INJECTION, SOLUTION INTRAMUSCULAR; INTRAVENOUS at 17:05

## 2022-09-28 RX ADMIN — CEFTRIAXONE SODIUM 2 G: 2 INJECTION, POWDER, FOR SOLUTION INTRAMUSCULAR; INTRAVENOUS at 13:41

## 2022-09-28 ASSESSMENT — ACTIVITIES OF DAILY LIVING (ADL)
ADLS_ACUITY_SCORE: 38
DEPENDENT_IADLS:: INDEPENDENT
ADLS_ACUITY_SCORE: 38

## 2022-09-28 NOTE — PLAN OF CARE
Goal Outcome Evaluation:      Neuro: A&O x4  Cardiac: WNL      Respiratory: WNL  GI/: WNL  Diet/appetite: Regular diet, NPO at 0500 9/28  Activity: SBA with walker  Pain: Managed per PRN's in MAR and ice to knee. Ice used for shoulder pain.   Skin: ACE wrap and immobilizer to R knee.  LDA's:  IV saline locked until NPO at 0500 9/28. Hemovac with minimal drainage.      Plan: Return to surgery tomorrow to wash out joint.

## 2022-09-28 NOTE — PROGRESS NOTES
[facility]opa[facility] Progress Note      Post-operative Day: 2 Days Post-Op    Procedure(s):  Right knee arthroscopic irrigation and debridement, partial synovectomy and left sternoclavicular joint needle aspiration  Subjective:    Pt reports ongoing pain in the right knee and the left S.C. joint. The pain medication is helping. He feels that he has been having a little more tenderness surrounding the left S.C. joint and the anterior chest wall near it. He discussed the plans for surgery today with  yesterday and understands the plan.     Chest pain, SOB:  No  Nausea, vomiting:  No  Lightheadedness, dizziness:  No  Neuro:  Patient denies new onset numbness or paresthesias      Objective:  Blood pressure 119/64, pulse 103, temperature 98.1  F (36.7  C), temperature source Oral, resp. rate 18, height 1.829 m (6'), weight 86.9 kg (191 lb 9.3 oz), SpO2 100 %.    Patient Vitals for the past 24 hrs:   BP Temp Temp src Pulse Resp SpO2   09/28/22 0822 119/64 -- -- 103 18 --   09/28/22 0601 134/83 98.1  F (36.7  C) Oral 93 18 100 %   09/27/22 2227 137/79 98.5  F (36.9  C) Oral 103 16 99 %   09/27/22 1548 117/77 99  F (37.2  C) Oral 99 16 99 %   09/27/22 1058 -- 98.3  F (36.8  C) Oral -- -- --       Wt Readings from Last 4 Encounters:   09/26/22 86.9 kg (191 lb 9.3 oz)       Gen: A&O x 3. NAD. Appears tired.   Wound status: Right knee with Ace wrap, ice, knee immobilizer present. Hemovac present with minimal bloody drainage. Left anterior chest wall with mild swelling over the left S.C. joint, no erythema noted.   Circulation, motion and sensation: Dorsiflexion/plantarflexion intact and equal bilaterally; distal lower extremity sensation is intact and equal bilaterally. Foot and toes are warm and well perfused.      Pertinent Labs   Lab Results: personally reviewed.     Recent Labs   Lab Test 09/28/22  0454 09/27/22  0454 09/26/22  0419 09/25/22  1854   HGB 10.6* 10.4* 11.8* 12.5*   HCT 32.2* 31.3* 34.8* 37.7*    MCV 87 87 85 88    280 226 218    141 135* 135*   .87* 269.53*  --  309.97*       Plan:   Continue current cares.  Plan for right knee repeat I&D, left S.C. joint I&D with  this afternoon at 1620.   Continue IV abx per ID, hospitalist: currently on IV vancomycin and ceftriaxone.  CT of the left S.C. area completed, report pending.   Echocardiogram pending.   Continue NPO, pain control prn.             Report completed by:  Leonardo Mccormick PA-C  Date: 9/28/2022  Time: 10:21 AM

## 2022-09-28 NOTE — ANESTHESIA PROCEDURE NOTES
Airway         Procedure Start/Stop Times: 9/28/2022 5:05 PM and 9/28/2022 5:06 PM  Staff -        CRNA: Justice Guardado APRN CRNA       Performed By: CRNAIndications and Patient Condition       Indications for airway management: francisca-procedural       Induction type:intravenous       Mask difficulty assessment: 0 - not attempted    Final Airway Details       Final airway type: supraglottic airway    Supraglottic Airway Details        Type: LMA       Brand: Air-Q       LMA size: 4.5    Post intubation assessment        Placement verified by: capnometry, equal breath sounds and chest rise        Number of attempts at approach: 1       Number of other approaches attempted: 0       Ease of procedure: easy       Dentition: Intact and Unchanged    Medication(s) Administered   Medication Administration Time: 9/28/2022 5:05 PM

## 2022-09-28 NOTE — PROGRESS NOTES
[facility] TRANSPORT NOTE  Data:   Reason for Transport:  surgery    Sanket Guerrero was transported to surgery via cart at 1600.  Patient was accompanied by Nursing Assistant. Equipment used for transport: None. Family was aware of reason for transport: yes    Action:  Report: given to Patricia    Response:  Patient's condition when transferred off unit was stable.    Mayra Pa RN

## 2022-09-28 NOTE — PLAN OF CARE
Neuro: A&O x4  Cardiac: WNL      Respiratory: WNL  GI/: WNL  Diet/appetite: Regular diet, NPO at 0500 9/28 for surgery  Activity: SBA with walker  Pain: Patient is using ice packs that are effective for rt knee and lt shoulder pain. Tylenol is scheduled.  Skin: ACE wrap and immobilizer to R knee.   LDA's:  IV saline locked until NPO at 0500 9/28. Hemovac compressed with minimal drainage.      Plan: Return to surgery 9/28 for joint wash out for left shoulder.

## 2022-09-28 NOTE — PROGRESS NOTES
[facility]    Hospitalist Progress Note    Date of Service (when I saw the patient): 09/28/2022    Assessment & Plan   Sanket Guerrero is a 32 year old male who was admitted on 9/25/2022 with several days of pain and swelling of the right knee.    Suspect polyarticular arthritis, acute  Possible septic knee, right  S/p right knee washout 9/26    Left sternoclavicular joint, left shoulder and right knee inflammatory process. Right knee with 353k WBC though only 24% neutrophils and no crystals. Concern for septic knee with that WBC count. CRP also very high 310. S/p right knee washout today 9/26.    - Started on empiric treatment with cefepime and vancomycin on 9/26 await cultures    - Change to ceftriaxone 2 g q 24 hrs on 9/27   - GC/Chlamydia urine PCR is negative   - Sternoclavicular joint aspiration obtained intraoperatively NGTD   - Nasal PCR for MRSA    - Check Lymes DNA PCR   - Synovial fluid cultures from R knee and L clavicle both growing Strep agalactiae   - Patient to go to OR for I&D    - Repeat blood cultures   - Echo ordered to rule out endocarditis.     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  Patient felt these were often associated with episodes of beer drinking.  However he drinks beer frequently so difficult to say at that the really the etiology.  Usually does not have pain or as large effusions this time.  He always felt it was gout though never actually saw physician.  Would go away spontaneously.  This episode was unusual at that there is much more pain in the knee.  He has swelling also in the left shoulder this time and left sternoclavicular joint.  Patient denies risk factors for GC or chlamydia.  He and his longtime girlfriend were tested for STDs about 5 years ago and were negative and he had had joint effusions before and after that.   - CRP  improving on antibiotics   - Consider antiinflammatory treatment if not resolving   - Referral to rheumatology at discharge for recurrent arthritis unless clearly infectious     Diet: Advance Diet as Tolerated: Regular Diet Adult    DVT Prophylaxis: Low Risk/Ambulatory with no VTE prophylaxis indicated  Trujillo Catheter: Not present  Central Lines: None  Code Status: Full Code      Disposition: Expected discharge in days once .    Tano Fuentes MD    Interval History   The patient resting in bed.  He has no acute complaints.    -Data reviewed today: I reviewed all new labs and imaging results over the last 24 hours. I personally reviewed no images or EKG's today.    Physical Exam   Temp: 98.1  F (36.7  C) Temp src: Oral BP: 119/64 Pulse: 103   Resp: 18 SpO2: 100 % O2 Device: None (Room air)    Vitals:    09/25/22 1521 09/26/22 0303   Weight: 85.7 kg (189 lb) 86.9 kg (191 lb 9.3 oz)     Vital Signs with Ranges  Temp:  [98.1  F (36.7  C)-99  F (37.2  C)] 98.1  F (36.7  C)  Pulse:  [] 103  Resp:  [16-18] 18  BP: (117-137)/(64-83) 119/64  SpO2:  [99 %-100 %] 100 %  I/O last 3 completed shifts:  In: -   Out: 1345 [Urine:1300; Drains:45]    Gen: Well nourished, well developed, alert and oriented x 3, no acute distressed  HEENT: Atraumatic, normocephalic; sclera non-injected, anicterric; oral mucosa moist, no lesion, no exudate  Lungs: Clear to ausculation, no wheezes, no rhonchi, no rales  Heart: Regular rate, regular rhythm, no gallops, no rubs, no murmurs  GI: Bowel sound normal, no hepatosplenomegaly, no masses, non-tender, non-distended, no guarding, no rebound tenderness  Lymph: No lymphadenopathy, no edema  Skin: No rashes, no chronic venous stasis, R knee in immobilizer    Medications     lactated ringers 75 mL/hr at 09/28/22 0521       acetaminophen  975 mg Oral Q8H     [Held by provider] aspirin  81 mg Oral BID     cefTRIAXone  2 g Intravenous Q24H     polyethylene glycol  17 g Oral Daily      senna-docusate  1 tablet Oral BID     sodium chloride (PF)  3 mL Intracatheter Q8H     vancomycin  1,250 mg Intravenous Q12H       Data   Recent Labs   Lab 09/28/22  0454 09/27/22  0454 09/26/22  0419   WBC 8.2 8.2 11.9*   HGB 10.6* 10.4* 11.8*   MCV 87 87 85    280 226    141 135*   POTASSIUM 4.5 4.5 4.0   CHLORIDE 103 105 99   CO2 28 27 26   BUN 5.2* 7.3 9.6   CR 0.81 0.87 0.81   ANIONGAP 10 9 10   JOCELYNE 8.8 8.2* 8.7   * 115* 126*   ALBUMIN 3.0* 2.7*  --    PROTTOTAL 6.8 6.2*  --    BILITOTAL 0.7 1.2  --    ALKPHOS 132* 102  --    ALT 86* 92*  --    AST 60* 100*  --        No results found for this or any previous visit (from the past 24 hour(s)).

## 2022-09-28 NOTE — PROGRESS NOTES
Since pt does not have insurance they will be self-pay for the iv abx. Cost for Ceftriaxone 2g q24h and Vanco 1250mg q12h is $162.12 per day for drug and supplies. Nursing cost will be $90.00 a visit if [facility] bills for it. Let us know how pt would like to proceed.      Please contact Intake with any questions, [phone] or In Basket pool,  [facility] ([phone]).

## 2022-09-28 NOTE — PLAN OF CARE
Neuro: A&O x4  Cardiac: WNL      Respiratory: WNL  GI/: WNL  Diet/appetite: Regular diet, NPO at 0500 9/28 for surgery. Patient is aware not to eat or drink.  Activity: SBA with walker  Pain: Patient is using ice packs that are effective for rt knee and lt shoulder pain. Tylenol is scheduled. Using oxycodone for increased pain.   Skin: ACE wrap and immobilizer to R knee.   LDA's:  IV saline locked until NPO at 0500 9/28. Hemovac compressed with minimal drainage.    /83 (BP Location: Left arm)   Pulse 93   Temp 98.1  F (36.7  C) (Oral)   Resp 18   Ht 1.829 m (6')   Wt 86.9 kg (191 lb 9.3 oz)   SpO2 100%   BMI 25.98 kg/m    Plan: Return to surgery 9/28 for joint wash out for left shoulder.

## 2022-09-28 NOTE — CONSULTS
"Care Management Initial Consult    General Information  Assessment completed with: Patient, Patient and pt fiance  Type of CM/SW Visit: Initial Assessment    Primary Care Provider verified and updated as needed: No   Readmission within the last 30 days:        Reason for Consult: insurance concerns, discharge planning, other (see comments)  Advance Care Planning:    Has no scanned ACP documents  General Information Comments: Need for infusion services at discharge indicated    Communication Assessment  Patient's communication style: spoken language (English or Bilingual)    Hearing Difficulty or Deaf: no   Wear Glasses or Blind: no    Cognitive  Cognitive/Neuro/Behavioral: WDL                      Living Environment:   People in home: significant other  Bee  Current living Arrangements: house      Able to return to prior arrangements: yes       Family/Social Support:  Care provided by: self  Provides care for:    Marital Status: Lives with Significant Other  Significant Other       Bee  Description of Support System: Supportive, Involved    Support Assessment: Adequate family and caregiver support, Adequate social supports    Current Resources:   Patient receiving home care services: No     Community Resources: None  Equipment currently used at home: none  Supplies currently used at home: None    Employment/Financial:  Employment Status: other (see comments) (pt states he is currently on a leave of absence from job)        Financial Concerns: other (see comments) (Pt stated insurance \"should start on October 1st\")           Lifestyle & Psychosocial Needs:  Social Determinants of Health     Tobacco Use: Not on file   Alcohol Use: Not on file   Financial Resource Strain: Not on file   Food Insecurity: Not on file   Transportation Needs: Not on file   Physical Activity: Not on file   Stress: Not on file   Social Connections: Not on file   Intimate Partner Violence: Not on file   Depression: Not on file   Housing " "Stability: Not on file       Functional Status:  Prior to admission patient needed assistance:   Dependent ADLs:: Independent  Dependent IADLs:: Independent       Mental Health Status:  Mental Health Status: No Current Concerns       Chemical Dependency Status:  Chemical Dependency Status: No Current Concerns             Values/Beliefs:  Spiritual, Cultural Beliefs, Advent Practices, Values that affect care:            Values/Beliefs Comment: Unknown    Additional Information:  Care Management has been consulted as patient has no medical insurance listed and providers are indicating patient will need IV antibiotics after hospital discharge.      Writer visited with patient and his fiance in the patient room this morning.  Reviewed the information above.  Discussed insurance.  Patient stated that he was told \"the insurance through [employer] will start on October 1st.\"  Discussed that writer has made a referral to the [employer] [facility] financial counselor, Rosey Vines, to check in with patient regarding insurance.      Discussed that the providers have indicated that he will need IV antibiotics after hospital discharge.  Writer has made a referral to [facility].  They will look into whether patient will have any insurance benefits and if the insurance will cover home IV antibiotics and home infusion services.  If patient has no coverage, may need to look into possible option of outpatient infusion services.      Care Management will continue to follow and assist with discharge planning.      [facility] has reported the following information:     Haydee Thomas RPH      Home [facility]    Reason for Visit       Progress Notes  Omayra Villarreal  Summary: iv abx coverage   Since pt does not have insurance they will be self-pay for the iv abx. Cost for Ceftriaxone 2g q24h and Vanco 1250mg q12h is $162.12 per day for drug and supplies. Nursing cost will be $90.00 a visit if [facility] bills for it. Let us " know how pt would like to proceed.        Please contact Intake with any questions, [phone] or In Basket pool, [facility] ([phone]).            Patient is not currently aware of this self pay quote.  Writer has spoken with Rosey Vines, the financial counselor this afternoon.  She plans to talk with patient again tomorrow, as he was just heading into surgery when she called today.  Rosey plans to get more information from patient tomorrow regarding the insurance he reports will start on October 1st.      Care Management to continue to follow.        YVONNE Dowling

## 2022-09-28 NOTE — ANESTHESIA PREPROCEDURE EVALUATION
Anesthesia Pre-Procedure Evaluation    Patient: Sanket Guerrero   MRN: [id number] : 1990        Procedure : Procedure(s):  ARTHROSCOPY, KNEE, WITH INCISION AND DRAINAGE          No past medical history on file.   Past Surgical History:   Procedure Laterality Date     ARTHROSCOPY KNEE INCISION AND DRAINAGE Bilateral 2022    Procedure: Right knee arthroscopic irrigation and debridement, partial synovectomy and left sternoclavicular joint needle aspiration;  Surgeon: Jovani Hahn MD;  Location: [facility] OR      No Known Allergies   Social History     Tobacco Use     Smoking status: Not on file     Smokeless tobacco: Not on file   Substance Use Topics     Alcohol use: Not on file      Wt Readings from Last 1 Encounters:   22 86.9 kg (191 lb 9.3 oz)        Anesthesia Evaluation   Pt has had prior anesthetic. Type: General and MAC.    No history of anesthetic complications       ROS/MED HX  ENT/Pulmonary:  - neg pulmonary ROS     Neurologic:  - neg neurologic ROS     Cardiovascular:  - neg cardiovascular ROS     METS/Exercise Tolerance:     Hematologic:  - neg hematologic  ROS     Musculoskeletal:  - neg musculoskeletal ROS     GI/Hepatic:  - neg GI/hepatic ROS     Renal/Genitourinary:  - neg Renal ROS     Endo:  - neg endo ROS     Psychiatric/Substance Use:  - neg psychiatric ROS     Infectious Disease:     (+) Recent Fever,     Malignancy:       Other:  - neg other ROS          Physical Exam    Airway        Mallampati: I   TM distance: > 3 FB   Neck ROM: full   Mouth opening: > 3 cm    Respiratory Devices and Support         Dental  no notable dental history         Cardiovascular   cardiovascular exam normal          Pulmonary   pulmonary exam normal                OUTSIDE LABS:  CBC:   Lab Results   Component Value Date    WBC 8.2 2022    WBC 8.2 2022    HGB 10.6 (L) 2022    HGB 10.4 (L) 2022    HCT 32.2 (L) 2022    HCT 31.3 (L) 2022     2022      09/27/2022     BMP:   Lab Results   Component Value Date     09/28/2022     09/27/2022    POTASSIUM 4.5 09/28/2022    POTASSIUM 4.5 09/27/2022    CHLORIDE 103 09/28/2022    CHLORIDE 105 09/27/2022    CO2 28 09/28/2022    CO2 27 09/27/2022    BUN 5.2 (L) 09/28/2022    BUN 7.3 09/27/2022    CR 0.81 09/28/2022    CR 0.87 09/27/2022     (H) 09/28/2022     (H) 09/27/2022     COAGS: No results found for: PTT, INR, FIBR  POC: No results found for: BGM, HCG, HCGS  HEPATIC:   Lab Results   Component Value Date    ALBUMIN 3.0 (L) 09/28/2022    PROTTOTAL 6.8 09/28/2022    ALT 86 (H) 09/28/2022    AST 60 (H) 09/28/2022    ALKPHOS 132 (H) 09/28/2022    BILITOTAL 0.7 09/28/2022     OTHER:   Lab Results   Component Value Date    LACT 1.0 09/26/2022    JOCELYNE 8.8 09/28/2022    .87 (H) 09/28/2022    SED 73 (H) 09/25/2022       Anesthesia Plan    ASA Status:  2      Anesthesia Type: General.     - Airway: LMA   Induction: Intravenous.   Maintenance: Balanced.        Consents    Anesthesia Plan(s) and associated risks, benefits, and realistic alternatives discussed. Questions answered and patient/representative(s) expressed understanding.     - Discussed: Risks, Benefits and Alternatives for BOTH SEDATION and the PROCEDURE were discussed     - Discussed with:  Patient         Postoperative Care    Pain management: IV analgesics.   PONV prophylaxis: Ondansetron (or other 5HT-3), Background Propofol Infusion     Comments:                    RESHMA Pugh CRNA

## 2022-09-29 LAB
BACTERIA SNV CULT: ABNORMAL
CREAT SERPL-MCNC: 0.82 MG/DL (ref 0.67–1.17)
GFR SERPL CREATININE-BSD FRML MDRD: >90 ML/MIN/1.73M2
HOLD SPECIMEN: NORMAL

## 2022-09-29 PROCEDURE — 120N000001 HC R&B MED SURG/OB

## 2022-09-29 PROCEDURE — 87040 BLOOD CULTURE FOR BACTERIA: CPT | Performed by: INTERNAL MEDICINE

## 2022-09-29 PROCEDURE — 250N000013 HC RX MED GY IP 250 OP 250 PS 637: Performed by: INTERNAL MEDICINE

## 2022-09-29 PROCEDURE — 250N000011 HC RX IP 250 OP 636: Performed by: INTERNAL MEDICINE

## 2022-09-29 PROCEDURE — 250N000013 HC RX MED GY IP 250 OP 250 PS 637: Performed by: ORTHOPAEDIC SURGERY

## 2022-09-29 PROCEDURE — 258N000003 HC RX IP 258 OP 636: Performed by: INTERNAL MEDICINE

## 2022-09-29 PROCEDURE — 36415 COLL VENOUS BLD VENIPUNCTURE: CPT | Performed by: EMERGENCY MEDICINE

## 2022-09-29 PROCEDURE — 99232 SBSQ HOSP IP/OBS MODERATE 35: CPT | Performed by: INTERNAL MEDICINE

## 2022-09-29 PROCEDURE — 82565 ASSAY OF CREATININE: CPT | Performed by: EMERGENCY MEDICINE

## 2022-09-29 RX ADMIN — OXYCODONE HYDROCHLORIDE 10 MG: 5 TABLET ORAL at 18:55

## 2022-09-29 RX ADMIN — CEFTRIAXONE SODIUM 2 G: 2 INJECTION, POWDER, FOR SOLUTION INTRAMUSCULAR; INTRAVENOUS at 14:18

## 2022-09-29 RX ADMIN — SENNOSIDES AND DOCUSATE SODIUM 1 TABLET: 50; 8.6 TABLET ORAL at 08:34

## 2022-09-29 RX ADMIN — OXYCODONE HYDROCHLORIDE 10 MG: 5 TABLET ORAL at 12:43

## 2022-09-29 RX ADMIN — IBUPROFEN 400 MG: 400 TABLET ORAL at 02:20

## 2022-09-29 RX ADMIN — OXYCODONE HYDROCHLORIDE 10 MG: 5 TABLET ORAL at 06:15

## 2022-09-29 RX ADMIN — ACETAMINOPHEN 975 MG: 325 TABLET, FILM COATED ORAL at 08:33

## 2022-09-29 RX ADMIN — IBUPROFEN 400 MG: 400 TABLET ORAL at 11:19

## 2022-09-29 RX ADMIN — ASPIRIN 81 MG: 81 TABLET, COATED ORAL at 21:19

## 2022-09-29 RX ADMIN — ACETAMINOPHEN 650 MG: 325 TABLET, FILM COATED ORAL at 21:19

## 2022-09-29 RX ADMIN — POLYETHYLENE GLYCOL 3350 17 G: 17 POWDER, FOR SOLUTION ORAL at 08:34

## 2022-09-29 RX ADMIN — OXYCODONE HYDROCHLORIDE 10 MG: 5 TABLET ORAL at 02:20

## 2022-09-29 RX ADMIN — SENNOSIDES AND DOCUSATE SODIUM 1 TABLET: 50; 8.6 TABLET ORAL at 21:19

## 2022-09-29 RX ADMIN — ACETAMINOPHEN 975 MG: 325 TABLET, FILM COATED ORAL at 15:59

## 2022-09-29 RX ADMIN — VANCOMYCIN HYDROCHLORIDE 1250 MG: 10 INJECTION, POWDER, LYOPHILIZED, FOR SOLUTION INTRAVENOUS at 06:16

## 2022-09-29 RX ADMIN — ACETAMINOPHEN 975 MG: 325 TABLET, FILM COATED ORAL at 00:07

## 2022-09-29 RX ADMIN — ASPIRIN 81 MG: 81 TABLET, COATED ORAL at 08:34

## 2022-09-29 ASSESSMENT — ACTIVITIES OF DAILY LIVING (ADL)
ADLS_ACUITY_SCORE: 40

## 2022-09-29 NOTE — PROGRESS NOTES
Care Management Follow Up    Length of Stay (days): 3    Expected Discharge Date: 09/29/2022     Concerns to be Addressed: other (see comments) (final IV infusion plan at discharge)  Home Infusion vs outpatient infusion  Patient plan of care discussed at interdisciplinary rounds: Yes    Anticipated Discharge Disposition: Home, Home Infusion, Outpatient Infusion Services     Anticipated Discharge Services: Other (see comment) (Infusion services)  Anticipated Discharge DME: None    Patient/family educated on Medicare website which has current facility and service quality ratings:  yes  Education Provided on the Discharge Plan:  yes  Patient/Family in Agreement with the Plan: yes    Referrals Placed by CM/SW: Home Infusion  Private pay costs discussed: home infusion costs    Additional Information:  MD indicated patient will need IV antibiotics, Rocephin at discharge, with follow-up ID planned for 10/5 or 10/7.      Spoke with Lilia @ [facility] ([phone]).  Private pay quote Cost for Ceftriaxone 2g q24h is $105.63 per day for drug and supplies.  Nursing cost will be $90.00 a visit.      Met with patient, SO, Bee and mother at bedside.  Discussed discharge above, home infusion vs OP infusion.     Patient provided insurance cards for insurance active on 10/1.  Forwarded insurance cards to Omayra Root [facility] team to review.  [facility] is not able to check benefits until Monday, 10/3.      Unknown out of pocket cost for infusion center vs home infusion at this time.  Offered patient and family private pay quote, as above.  They are agreeable for private pay [facility] plan.  [facility] insurance team to run benefits 10/3 and connect directly with patient.  He may elect to transition to OP infusion based on insurance costs.  Discussed this with [facility] team.  They are aware and agreeable to work with patient on coverage.      Spoke with Mya Calderon, [facility] liaison.  She plans to connect with CM team 9/30 and plan for education, following PICC insertion, for  expected discharge 10/1.      MD to place home infusion order for discharge.      PLAN:  Home with [facility]     Rut Clark MSN, RN  Inpatient Care Coordinator  [facility] [phone]  [facility] [phone]

## 2022-09-29 NOTE — PROGRESS NOTES
Cost for Ceftriaxone 2g q24h is $105.63 per day for drug and supplies.    Nursing cost will be $90.00 a visit if [facility] bills for it.

## 2022-09-29 NOTE — PLAN OF CARE
"/70 (BP Location: Right arm, Patient Position: Supine)   Pulse 86   Temp 98.2  F (36.8  C) (Oral)   Resp 16   Ht 1.829 m (6')   Wt 86.9 kg (191 lb 9.3 oz)   SpO2 98%   BMI 25.98 kg/m    Patient up with wife to BR. Let arm in sling and right knee is in immobilizer. Ice to right knee. Pain managed with oxycodone 10mg prn and scheduled tylenol.  Right knee and left shoulder SAYDA stripped and to bulb suction, minimal serosanguinous output noted in both SAYDA.  Informed that orders are for NWB LUE, sling in place. Has right knee immobilizer that is in use. He is requesting a \"cane\" to assist with mobility.                       "

## 2022-09-29 NOTE — BRIEF OP NOTE
[facility] Brief Operative Note    Pre-operative diagnosis: Septic arthritis of knee (H) [M00.9]  Septic arthritis of left sternoclavicular joint (H) [M00.9]   Post-operative diagnosis Same as preop   Procedure: Procedure(s):  Right IRRIGATION AND DEBRIDEMENT, KNEE, ARTHROSCOPIC  Open IRRIGATION AND DEBRIDEMENT of Sternoclavicular Joint Left   Surgeon(s): Surgeon(s) and Role:     * Jovani Hahn MD - Primary   Estimated blood loss: 80 mL    Specimens: * No specimens in log *   :

## 2022-09-29 NOTE — PLAN OF CARE
AOx4, up w/SBA. Temp of 99.4, prn oxycodone 10mg x 3, ibuprofen x 1, pain significantly improved from previous. LR @ 75ml/hr. Left shoulder sling on, SAYDA with bright red small amount, estimate 5ml drainage, bandage CDI  RLE immobilizer on, icing per patient request, SAYDA drain with darker red 30ml output. SAYDA drains stripped x 2 to maintain patency. Rested comfortably throughout shift.

## 2022-09-29 NOTE — PROGRESS NOTES
[facility] Orthopaedics Progress Note      Post-operative Day: 1 Day Post-Op    Procedure(s):  Right IRRIGATION AND DEBRIDEMENT, KNEE, ARTHROSCOPIC  Open IRRIGATION AND DEBRIDEMENT of Sternal Clavicular Joint Left  Subjective:    Pt states that he is feeling better today, with improved pain in the right knee. His left S.C. joint and shoulder are stiff with movement; he understands that he needs to use the sling and not bear weight through the arm.     Neuro:  Patient denies new onset numbness or paresthesias      Objective:  Blood pressure 115/72, pulse 102, temperature 98.1  F (36.7  C), temperature source Oral, resp. rate 16, height 1.829 m (6'), weight 86.9 kg (191 lb 9.3 oz), SpO2 98 %.    Patient Vitals for the past 24 hrs:   BP Temp Temp src Pulse Resp SpO2   09/29/22 1015 115/72 98.1  F (36.7  C) Oral 102 16 98 %   09/29/22 0628 117/70 98.2  F (36.8  C) Oral 86 16 --   09/29/22 0210 137/66 99.4  F (37.4  C) Oral 101 16 98 %   09/28/22 2225 (!) 143/83 98.6  F (37  C) Oral 106 17 99 %   09/28/22 2112 (!) 146/86 99  F (37.2  C) Oral 108 16 96 %   09/28/22 2041 -- -- -- -- -- 96 %   09/28/22 2030 (!) 149/95 98.9  F (37.2  C) Oral 112 12 96 %   09/28/22 2015 (!) 148/90 -- -- 114 12 98 %   09/28/22 2000 (!) 151/95 -- -- 118 14 96 %   09/28/22 1945 (!) 151/103 -- -- 113 17 94 %   09/28/22 1930 (!) 167/107 98.9  F (37.2  C) Oral (!) 123 20 98 %   09/28/22 1926 (!) 130/100 -- -- (!) 126 25 92 %   09/28/22 1500 129/86 (!) 101.6  F (38.7  C) Oral 119 18 96 %       Wt Readings from Last 4 Encounters:   09/26/22 86.9 kg (191 lb 9.3 oz)       Gen: A&O x 3. NAD. Appears comfortable.  Wound status: Left anterior chest wall with dressings x 2, clean and dry. SAYDA drain present with minimal serosanguineous drainage. Right knee with Ace wrap, knee immobilizer and ice in place. Bulb drain with mild bloody drainage.  Circulation, motion and sensation: Dorsiflexion/plantarflexion intact and equal bilaterally; distal upper and lower  extremity sensation is intact and equal bilaterally. Foot and toes, fingers are warm and well perfused.    Swelling: Mild    Pertinent Labs   Lab Results: personally reviewed.     Recent Labs   Lab Test 09/28/22  0454 09/27/22  0454 09/26/22  0419 09/25/22  1854   HGB 10.6* 10.4* 11.8* 12.5*   HCT 32.2* 31.3* 34.8* 37.7*   MCV 87 87 85 88    280 226 218    141 135* 135*   .87* 269.53*  --  309.97*       Plan:   Continue current cares and rehabilitation.  Anticoagulation protocol: ASA 81mg BID  x 30  days  Pain medications: oxycodone, tylenol and ibuprofen  Weight bearing status:  WBAT on RLE with knee immobilizer in place, NWB LUE  Ok to remove knee immobilizer at rest, discussed working on gentle ROM of the right knee. Use KI for weight bearing.   Sling to left shoulder at all times, ok to do left wrist and elbow ROM.   Discussed with DELMI Garner. Continue to monitor the drainage at the left shoulder SAYDA drain, if it continues to be minimal or stops ok to remove the drain this afternoon. Continue to monitor drainage at the right knee drain.   Discussed with ; he plans to further review with ID for ongoing abx treatment plans.     Report completed by:  Leonardo Mccormick PA-C  Date: 9/29/2022  Time: 10:52 AM

## 2022-09-29 NOTE — PROGRESS NOTES
[facility]    Hospitalist Progress Note    Date of Service (when I saw the patient): 09/29/2022    Assessment & Plan   Sanket Guerrero is a 32 year old male who was admitted on 9/25/2022 with several days of pain and swelling of the right knee.    Septic left sternoclavicular joint  Septic right knee  S/p right knee washout 9/26    Left sternoclavicular joint, left shoulder and right knee inflammatory process. Right knee with 353k WBC though only 24% neutrophils and no crystals. Concern for septic knee with that WBC count. CRP also very high 310. S/p right knee washout today 9/26.    - Started on empiric treatment with cefepime and vancomycin on 9/26 await cultures    - Change to ceftriaxone 2 g q 24 hrs on 9/27   - GC/Chlamydia urine PCR is negative   - Sternoclavicular joint aspiration obtained intraoperatively NGTD   - Nasal PCR for MRSA    - Check Lymes DNA PCR   - Synovial fluid cultures from R knee and L clavicle both growing Strep agalactiae   - Patient to go to OR for I&D    - Blood cultures 9/25, 9/26, 9/27, 9/28, 9/29 NGTD   - TTE negative for endocarditis.   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  Patient felt these were often associated with episodes of beer drinking.  However he drinks beer frequently so difficult to say at that the really the etiology.  Usually does not have pain or as large effusions this time.  He always felt it was gout though never actually saw physician.  Would go away spontaneously.  This episode was unusual at that there is much more pain in the knee.  He has swelling also in the left shoulder this time and left sternoclavicular joint.  Patient denies risk factors for GC  or chlamydia.  He and his longtime girlfriend were tested for STDs about 5 years ago and were negative and he had had joint effusions before and after that.   - CRP improving on antibiotics   - Consider antiinflammatory treatment if not resolving   - Referral to rheumatology at discharge for recurrent arthritis unless clearly infectious   - As this appears infectious, would hold on rheumatology referral     Diet: Advance Diet as Tolerated: Regular Diet Adult    DVT Prophylaxis: Low Risk/Ambulatory with no VTE prophylaxis indicated  [place] Catheter: Not present  Central Lines: None  Code Status: Full Code      Disposition: Expected discharge in days once .    Tano Fuentes MD    Interval History   The patient resting in bed.  He has no acute complaints.    -Data reviewed today: I reviewed all new labs and imaging results over the last 24 hours. I personally reviewed no images or EKG's today.    Physical Exam   Temp: 98.1  F (36.7  C) Temp src: Oral BP: 115/72 Pulse: 102   Resp: 16 SpO2: 98 % O2 Device: None (Room air) Oxygen Delivery: 1 LPM  Vitals:    09/25/22 1521 09/26/22 0303   Weight: 85.7 kg (189 lb) 86.9 kg (191 lb 9.3 oz)     Vital Signs with Ranges  Temp:  [98.1  F (36.7  C)-101.6  F (38.7  C)] 98.1  F (36.7  C)  Pulse:  [] 102  Resp:  [12-25] 16  BP: (115-167)/() 115/72  SpO2:  [92 %-99 %] 98 %  I/O last 3 completed shifts:  In: 1690 [P.O.:50; I.V.:1640]  Out: 1880 [Urine:1750; Drains:50; Blood:80]    Gen: Well nourished, well developed, alert and oriented x 3, no acute distressed  HEENT: Atraumatic, normocephalic; sclera non-injected, anicterric; oral mucosa moist, no lesion, no exudate  Lungs: Clear to ausculation, no wheezes, no rhonchi, no rales  Heart: Regular rate, regular rhythm, no gallops, no rubs, no murmurs  GI: Bowel sound normal, no hepatosplenomegaly, no masses, non-tender, non-distended, no guarding, no rebound tenderness  Lymph: No lymphadenopathy, no edema  Skin: No rashes,  no chronic venous stasis, R knee in immobilizer, bandage over left sternoclavicular joint CDI    Medications       acetaminophen  975 mg Oral Q8H     aspirin  81 mg Oral BID     cefTRIAXone  2 g Intravenous Q24H     polyethylene glycol  17 g Oral Daily     senna-docusate  1 tablet Oral BID     sodium chloride (PF)  3 mL Intracatheter Q8H       Data   Recent Labs   Lab 22  0616 22  0454 22  0454 22  0419   WBC  --  8.2 8.2 11.9*   HGB  --  10.6* 10.4* 11.8*   MCV  --  87 87 85   PLT  --  412 280 226   NA  --  141 141 135*   POTASSIUM  --  4.5 4.5 4.0   CHLORIDE  --  103 105 99   CO2  --     BUN  --  5.2* 7.3 9.6   CR 0.82 0.81 0.87 0.81   ANIONGAP  --  10 9 10   JOCELYNE  --  8.8 8.2* 8.7   GLC  --  113* 115* 126*   ALBUMIN  --  3.0* 2.7*  --    PROTTOTAL  --  6.8 6.2*  --    BILITOTAL  --  0.7 1.2  --    ALKPHOS  --  132* 102  --    ALT  --  86* 92*  --    AST  --  60* 100*  --        Recent Results (from the past 24 hour(s))   Echocardiogram Complete   Result Value    LVEF  60-65%    Narrative    [id number]  [id number]  [id number]  [id number]^JOEL^MELANY^MADHU     [facility]  Echocardiography Laboratory  [address] [place] [address]     Name: SEDRICK TONY  MRN: [id number]  : 1990  Study Date: 2022 10:59 AM  Age: 32 yrs  Gender: Male  Patient Location: [facility]  Reason For Study: Endocarditis  Ordering Physician: MELANY MALDONADO  Performed By: Mary Russell RDCS     BSA: 2.1 m2  Height: 72 in  Weight: 191 lb  HR: 99  BP: 119/64 mmHg  ______________________________________________________________________________  Procedure  Complete Portable Echo Adult.  ______________________________________________________________________________  Interpretation Summary     Left ventricular systolic function is normal.  The visual ejection fraction is 60-65%.  The right ventricle is normal in structure, function and size.  Valve structures without significant  dysfunction. No apparent valvular  vegetations.  The inferior vena cava was normal in size with preserved respiratory  variability.  There is no pericardial effusion.     No prior study for comparison. Consider LINDA if clnically indicated to assess  for endocarditis.  ______________________________________________________________________________  Left Ventricle  The left ventricle is normal in structure, function and size. Left ventricular  systolic function is normal. The visual ejection fraction is 60-65%. Normal  left ventricular wall motion.     Right Ventricle  The right ventricle is normal in structure, function and size.     Atria  Normal left atrial size. Right atrial size is normal.     Mitral Valve  The mitral valve is normal in structure and function.     Tricuspid Valve  The tricuspid valve is normal in structure and function. Right ventricular  systolic pressure could not be approximated due to inadequate tricuspid  regurgitation.     Aortic Valve  The aortic valve is normal in structure and function.     Pulmonic Valve  The pulmonic valve is normal in structure and function.     Vessels  The aortic root is normal size. Normal size ascending aorta. The inferior vena  cava was normal in size with preserved respiratory variability.     Pericardium  There is no pericardial effusion.     ______________________________________________________________________________  MMode/2D Measurements & Calculations  IVSd: 1.1 cm  LVIDd: 4.7 cm  LVIDs: 2.8 cm  LVPWd: 0.97 cm  FS: 39.5 %     LV mass(C)d: 169.1 grams  LV mass(C)dI: 80.9 grams/m2  Ao root diam: 3.4 cm  LA dimension: 2.9 cm  asc Aorta Diam: 2.9 cm  LA/Ao: 0.84  RWT: 0.41     Doppler Measurements & Calculations  MV E max jerri: 84.9 cm/sec  MV A max jerri: 96.1 cm/sec  MV E/A: 0.88  MV dec time: 0.13 sec  PA acc time: 0.12 sec  E/E' av.5  Lateral E/e': 8.5  Medial E/e': 8.6      ______________________________________________________________________________  Report approved by: Kwaku Mota 09/28/2022 02:08 PM

## 2022-09-29 NOTE — ANESTHESIA POSTPROCEDURE EVALUATION
Patient: Sanket Guerrero    Procedure: Procedure(s):  Right IRRIGATION AND DEBRIDEMENT, KNEE, ARTHROSCOPIC  Open IRRIGATION AND DEBRIDEMENT of Sternal Clavicular Joint Left       Anesthesia Type:  General    Note:  Disposition: Inpatient   Postop Pain Control: Uneventful            Sign Out: Well controlled pain   PONV: No   Neuro/Psych: Uneventful            Sign Out: Acceptable/Baseline neuro status   Airway/Respiratory: Uneventful            Sign Out: Acceptable/Baseline resp. status   CV/Hemodynamics: Uneventful            Sign Out: Acceptable CV status; No obvious hypovolemia; No obvious fluid overload   Other NRE: NONE   DID A NON-ROUTINE EVENT OCCUR? No           Last vitals:  Vitals Value Taken Time   /103 09/28/22 1945   Temp 37.2  C (98.9  F) 09/28/22 1930   Pulse 108 09/28/22 1954   Resp 11 09/28/22 1954   SpO2 98 % 09/28/22 1954   Vitals shown include unvalidated device data.    Electronically Signed By: RESHMA Pugh CRNA  September 28, 2022  7:56 PM

## 2022-09-29 NOTE — PHARMACY-VANCOMYCIN DOSING SERVICE
Pharmacy Vancomycin Note  Date of Service 2022  Patient's  1990   32 year old, male    Indication: Bone and Joint Infection  Day of Therapy: 3  Current vancomycin regimen:  1250 mg IV q12h  Current vancomycin monitoring method: AUC  Current vancomycin therapeutic monitoring goal: 400-600 mg*h/L    InsightRX Prediction of Current Vancomycin Regimen  Loading dose: N/A  Regimen: 1250 mg IV every 12 hours.  Start time: 00:04 on 2022  Exposure target: AUC24 (range)400-600 mg/L.hr   AUC24,ss: 351 mg/L.hr  Probability of AUC24 > 400: 25 %  Ctrough,ss: 8 mg/L  Probability of Ctrough,ss > 20: 0 %  Probability of nephrotoxicity (Lodise YUVAL ): 5 %    Current estimated CrCl = Estimated Creatinine Clearance: 159.4 mL/min (based on SCr of 0.81 mg/dL).    Creatinine for last 3 days  2022:  4:19 AM Creatinine 0.81 mg/dL  2022:  4:54 AM Creatinine 0.87 mg/dL  2022:  4:54 AM Creatinine 0.81 mg/dL    Recent Vancomycin Levels (past 3 days)  2022:  9:41 PM Vancomycin <4.0 ug/mL    Vancomycin IV Administrations (past 72 hours)                   vancomycin (VANCOCIN) 1,250 mg in sodium chloride 0.9 % 250 mL intermittent infusion (mg) 1,250 mg New Bag 22 2237    vancomycin (VANCOCIN) 1,250 mg in sodium chloride 0.9 % 250 mL intermittent infusion (mg) 1,250 mg New Bag 22 0521     1,250 mg New Bag 22 1849     1,250 mg New Bag  0636     1,250 mg New Bag 22 1823     1,250 mg New Bag  0400                Nephrotoxins and other renal medications (From now, onward)    Start     Dose/Rate Route Frequency Ordered Stop    22 2200  vancomycin (VANCOCIN) 1,250 mg in sodium chloride 0.9 % 250 mL intermittent infusion         1,250 mg  over 90 Minutes Intravenous EVERY 12 HOURS 22  ibuprofen (ADVIL/MOTRIN) tablet 400 mg         400 mg Oral EVERY 6 HOURS PRN 22/25/22 0000  amoxicillin-clavulanate (AUGMENTIN) 875-125 MG tablet          1 tablet Oral 2 TIMES DAILY 09/25/22 2322 09/25/22 0000  naproxen (NAPROSYN) 500 MG tablet         500 mg Oral 2 TIMES DAILY WITH MEALS 09/25/22 2322 10/07/22 2359             Contrast Orders - past 72 hours (72h ago, onward)    Start     Dose/Rate Route Frequency Stop    09/27/22 1700  iopamidol (ISOVUE-370) solution 85 mL         85 mL Intravenous ONCE 09/27/22 1757          Interpretation of levels and current regimen:  Vancomycin level is reflective of AUC less than 400    Has serum creatinine changed greater than 50% in last 72 hours: No    Urine output:  Good urine output    Renal Function: Stable    InsightRX Prediction of Planned New Vancomycin Regimen  Loading dose: N/A  Regimen: 1250 mg IV every 8 hours.  Start time: 23:24 on 09/28/2022  Exposure target: AUC24 (range)400-600 mg/L.hr   AUC24,ss: 525 mg/L.hr  Probability of AUC24 > 400: 92 %  Ctrough,ss: 14.5 mg/L  Probability of Ctrough,ss > 20: 9 %  Probability of nephrotoxicity (Lodise YUVAL 2009): 10 %      Plan:  1. Increase Dose to 1250 mg IV q8h  2. Vancomycin monitoring method: AUC  3. Vancomycin therapeutic monitoring goal: 400-600 mg*h/L  4. Pharmacy will check vancomycin levels as appropriate in 1-3 Days.  5. Serum creatinine levels will be ordered daily for the first week of therapy and at least twice weekly for subsequent weeks.    Blaire Pathak, KavyaD

## 2022-09-29 NOTE — ANESTHESIA CARE TRANSFER NOTE
Patient: Sanket BONE Ly    Procedure: Procedure(s):  Right IRRIGATION AND DEBRIDEMENT, KNEE, ARTHROSCOPIC  Open IRRIGATION AND DEBRIDEMENT of Sternal Clavicular Joint Left       Diagnosis: Septic arthritis of knee (H) [M00.9]  Septic arthritis of left sternoclavicular joint (H) [M00.9]  Diagnosis Additional Information: No value filed.    Anesthesia Type:   General     Note:    Oropharynx: oropharynx clear of all foreign objects and spontaneously breathing  Level of Consciousness: awake  Oxygen Supplementation: nasal cannula  Level of Supplemental Oxygen (L/min / FiO2): 2  Independent Airway: airway patency satisfactory and stable  Dentition: dentition unchanged  Vital Signs Stable: post-procedure vital signs reviewed and stable  Report to RN Given: handoff report given  Patient transferred to: PACU    Handoff Report: Identifed the Patient, Identified the Reponsible Provider, Reviewed the pertinent medical history, Discussed the surgical course, Reviewed Intra-OP anesthesia mangement and issues during anesthesia, Set expectations for post-procedure period and Allowed opportunity for questions and acknowledgement of understanding      Vitals:  Vitals Value Taken Time   /100 09/28/22 1926   Temp     Pulse 123 09/28/22 1930   Resp 14 09/28/22 1930   SpO2 99 % 09/28/22 1930   Vitals shown include unvalidated device data.    Electronically Signed By: RESHMA Pugh CRNA  September 28, 2022  7:31 PM

## 2022-09-29 NOTE — OP NOTE
Operative Note    Pre-operative diagnosis: Septic arthritis of knee (H) [M00.9]  Septic arthritis of left sternoclavicular joint (H) [M00.9]   Post-operative diagnosis Same as preop   Procedure: Procedure(s):  Right IRRIGATION AND DEBRIDEMENT, KNEE, ARTHROSCOPIC  Open IRRIGATION AND DEBRIDEMENT of Sternoclavicular Joint Left   Surgeon(s): Surgeon(s) and Role:     * Jovani Hahn MD - Primary       The patient was seen in the preoperative area where his operative sites were marked with my initials, which included the left sternoclavicular joint and the right knee.  Brought to the operating room and placed on the operating room table.  He then underwent general anesthesia by our anesthesia colleagues.      The right lower extremity was prepped and draped in standard sterile fashion.  I then utilized the previous portal incisions after removing the sutures.  An outflow portal was placed again in the superior lateral knee.  We immediately were able to then evacuate a large amount of hematoma.  There was a lot of organized clot within the knee that was evacuated with a shaver.  We ran several liters through the knee of normal saline and evacuated as much of the hematoma as possible.  Utilize coagulation device to coagulate any bleeders of the synovium.  We then placed a SAYDA 15 drain out of the superior lateral outflow portal.  The anterior portal incisions were then closed with 3-0 nylon.  The wounds were covered with Xeroform, 4 x 4's, ABD and Sof-Rol.  An Ace wrap was wrapped around the knee.    We then turned our attention to the left sternoclavicular joint.  The area was prepped and draped in sterile sterile fashion.  I made an approximately 3.5 cm incision horizontally over the sternoclavicular joint.  This was taken down to the sternoclavicular joint which was identified.  There was a small effusion.  A vertical incision to the anterior capsule was made.  I did not identify any purulence.  The joint was gently  debrided with a freer elevator and then thoroughly irrigated with 1 L of normal saline.  Hemostasis was obtained.  A small SAYDA drain was left in place and the deep and subcutaneous layers were closed with 2-0 Monocryl.  Skin closed with 3-0 nylon.  Xeroform was applied over the wound and island dressing were applied.    Postoperative plan:  Nonweightbearing left upper extremity.  To be in sling for 2 weeks postop, okay to remove for elbow ROM and hygiene.     Weightbearing as tolerated to the right lower extremity.  Drain to be stripped regularly.    Continue IV antibiotics per infectious disease.    Follow up in clinic in 10 to 14 days for suture removal and reevaluation.

## 2022-09-30 LAB
BACTERIA BLD CULT: NO GROWTH
CRP SERPL-MCNC: 213.18 MG/L
HOLD SPECIMEN: NORMAL
LACTATE SERPL-SCNC: 0.5 MMOL/L (ref 0.7–2)

## 2022-09-30 PROCEDURE — 250N000009 HC RX 250: Performed by: INTERNAL MEDICINE

## 2022-09-30 PROCEDURE — 250N000011 HC RX IP 250 OP 636: Performed by: INTERNAL MEDICINE

## 2022-09-30 PROCEDURE — 120N000001 HC R&B MED SURG/OB

## 2022-09-30 PROCEDURE — 99232 SBSQ HOSP IP/OBS MODERATE 35: CPT | Performed by: INTERNAL MEDICINE

## 2022-09-30 PROCEDURE — 250N000013 HC RX MED GY IP 250 OP 250 PS 637: Performed by: INTERNAL MEDICINE

## 2022-09-30 PROCEDURE — 86140 C-REACTIVE PROTEIN: CPT | Performed by: INTERNAL MEDICINE

## 2022-09-30 PROCEDURE — 250N000013 HC RX MED GY IP 250 OP 250 PS 637: Performed by: ORTHOPAEDIC SURGERY

## 2022-09-30 PROCEDURE — 36415 COLL VENOUS BLD VENIPUNCTURE: CPT | Performed by: INTERNAL MEDICINE

## 2022-09-30 PROCEDURE — 87040 BLOOD CULTURE FOR BACTERIA: CPT | Performed by: INTERNAL MEDICINE

## 2022-09-30 PROCEDURE — 83605 ASSAY OF LACTIC ACID: CPT | Performed by: INTERNAL MEDICINE

## 2022-09-30 RX ORDER — GINSENG 100 MG
CAPSULE ORAL DAILY PRN
Status: DISCONTINUED | OUTPATIENT
Start: 2022-09-30 | End: 2022-10-05 | Stop reason: HOSPADM

## 2022-09-30 RX ADMIN — OXYCODONE HYDROCHLORIDE 5 MG: 5 TABLET ORAL at 22:54

## 2022-09-30 RX ADMIN — OXYCODONE HYDROCHLORIDE 10 MG: 5 TABLET ORAL at 11:47

## 2022-09-30 RX ADMIN — CEFTRIAXONE SODIUM 2 G: 2 INJECTION, POWDER, FOR SOLUTION INTRAMUSCULAR; INTRAVENOUS at 14:22

## 2022-09-30 RX ADMIN — ASPIRIN 81 MG: 81 TABLET, COATED ORAL at 08:05

## 2022-09-30 RX ADMIN — OXYCODONE HYDROCHLORIDE 5 MG: 5 TABLET ORAL at 19:44

## 2022-09-30 RX ADMIN — OXYCODONE HYDROCHLORIDE 10 MG: 5 TABLET ORAL at 02:47

## 2022-09-30 RX ADMIN — BACITRACIN: 500 OINTMENT TOPICAL at 14:52

## 2022-09-30 RX ADMIN — POLYETHYLENE GLYCOL 3350 17 G: 17 POWDER, FOR SOLUTION ORAL at 08:05

## 2022-09-30 RX ADMIN — SENNOSIDES AND DOCUSATE SODIUM 1 TABLET: 50; 8.6 TABLET ORAL at 08:05

## 2022-09-30 RX ADMIN — IBUPROFEN 400 MG: 400 TABLET ORAL at 15:07

## 2022-09-30 RX ADMIN — ASPIRIN 81 MG: 81 TABLET, COATED ORAL at 19:44

## 2022-09-30 RX ADMIN — ACETAMINOPHEN 650 MG: 325 TABLET, FILM COATED ORAL at 22:54

## 2022-09-30 RX ADMIN — SENNOSIDES AND DOCUSATE SODIUM 1 TABLET: 50; 8.6 TABLET ORAL at 19:44

## 2022-09-30 RX ADMIN — ACETAMINOPHEN 650 MG: 325 TABLET, FILM COATED ORAL at 08:05

## 2022-09-30 ASSESSMENT — ACTIVITIES OF DAILY LIVING (ADL)
ADLS_ACUITY_SCORE: 40

## 2022-09-30 NOTE — PROGRESS NOTES
Pain: Managed with scheduled tylenol, prn oxycodone and ibuprofen. Ice to right knee.  Left shoulder SAYDA removed, one steri strip used for closure and covered with sterile gauze/tegaderm  Mobility: Up with assist of one and walker, did not go well with using a cane, gait was unsteady. Aware of NWB LUE but is bearing upper body weight with use of walker. Fiance helping to walk to the BR, patient is private.  CMS RLE and LUE within defined limits, no numbness/tingling reported.    bp elevated, likely in part due to pain  - restart home bp medications  - prn hydralazine/labetalol

## 2022-09-30 NOTE — PROGRESS NOTES
[facility] Orthopaedics Progress Note      Post-operative Day: 2 Days Post-Op    Procedure(s):  Right IRRIGATION AND DEBRIDEMENT, KNEE, ARTHROSCOPIC  Open IRRIGATION AND DEBRIDEMENT of Sternal Clavicular Joint Left  Subjective:    Pt reports ongoing tenderness at the left shoulder and a feeling of swelling in the right knee. Overall he is feeling better but is concerned about the fever he had overnight.     Chest pain, SOB:  No  Nausea, vomiting:  No  Lightheadedness, dizziness:  No  Neuro:  Patient denies new onset numbness or paresthesias      Objective:  Blood pressure 134/82, pulse 108, temperature 99.1  F (37.3  C), temperature source Oral, resp. rate 18, height 1.829 m (6'), weight 86.9 kg (191 lb 9.3 oz), SpO2 98 %.    Patient Vitals for the past 24 hrs:   BP Temp Temp src Pulse Resp SpO2   09/30/22 0911 134/82 99.1  F (37.3  C) Oral 108 18 98 %   09/30/22 0742 120/80 (!) 101.2  F (38.4  C) Oral 114 18 99 %   09/29/22 2123 136/86 98.9  F (37.2  C) Oral 112 18 100 %   09/29/22 1505 -- -- -- -- -- 98 %   09/29/22 1453 116/77 99.1  F (37.3  C) Oral 98 (!) 1 98 %       Wt Readings from Last 4 Encounters:   09/26/22 86.9 kg (191 lb 9.3 oz)       Gen: A&O x 3. NAD. Appears comfortable.  Wound status: Left S.C. dressings are dry and intact, minimal bloody drainage present. Mild TTP surrounding the dressings. No erythema or increased warmth. Right knee with Ace and immobilizer present. Minimal bloody drainage in the SAYDA bulb.   Circulation, motion and sensation: Dorsiflexion/plantarflexion intact and equal bilaterally; distal lower extremity sensation is intact and equal bilaterally. Foot and toes are warm and well perfused. Left hand ROM intact, sensation intact in the UE.   Swelling: Mild  Calf tenderness: calves are soft and non-tender bilaterally     Pertinent Labs   Lab Results: personally reviewed.     Recent Labs   Lab Test 09/30/22  0601 09/28/22  0454 09/27/22  0454 09/26/22  0419   HGB  --  10.6* 10.4*  11.8*   HCT  --  32.2* 31.3* 34.8*   MCV  --  87 87 85   PLT  --  412 280 226   NA  --  141 141 135*   .18* 216.87* 269.53*  --        Plan:   Continue current cares and rehabilitation.  Anticoagulation protocol: ASA 81mg BID x 30  days  Pain medications: Tylenol, oxycodone  Weight bearing status:  WBAT RLE, NWB LUE  Right knee SAYDA drainage has slowed, ok to remove. Discussed with Patsy AWAD, drain is sewn in. After removal apply steri-strips and sterile dressing to drain site. Ok to remove large Ace wrap as well, may reapply shorter as desired.   Discussed the above with the pt, encouraged him to work on right knee ROM.   IV antibiotics per ; IV Rocephin recommended per ID.              Report completed by:  Leonardo Mccormick PA-C  Date: 9/30/2022  Time: 10:36 AM

## 2022-09-30 NOTE — PLAN OF CARE
DATE & TIME: 9/29 7P-7A    Cognitive Concerns/ Orientation : Alert and oriented   BEHAVIOR & AGGRESSION TOOL COLOR: Green, pleasant and cooperative   ABNL VS/O2: VSS on room air   MOBILITY: Assist of 1, gait belt and mobility devise with knee immobilizer on  PAIN MANAGEMENT: PRN tylenol and oxy 10 mg  DIET: Regular  BOWEL/BLADDER: Continent, using the urinal in bed  ABNL LAB/BG: , trending down   DRAIN/DEVICES: SAYDA drian to right knee with 7 ml output, PIV SL   TELEMETRY RHYTHM: n/a   SKIN: Right knee dressing CDI, ace wrap on. Left shoulder splint in plae   TESTS/PROCEDURES: n/a   D/C DATE: pending

## 2022-09-30 NOTE — PROGRESS NOTES
Care Management Follow Up    Length of Stay (days): 4    Expected Discharge Date: 10/2   Concerns to be Addressed: other (see comments) (final IV infusion plan at discharge)  Home Infusion vs outpatient infusion  Patient plan of care discussed at interdisciplinary rounds: Yes    Anticipated Discharge Disposition: Home, Home Infusion, Outpatient Infusion Services     Anticipated Discharge Services: Other (see comment) (Infusion services)  Anticipated Discharge DME: None    Patient/family educated on Medicare website which has current facility and service quality ratings: yes  Education Provided on the Discharge Plan:  yes  Patient/Family in Agreement with the Plan: yes    Referrals Placed by CM/SW: Home Infusion  Private pay costs discussed: Not applicable    Additional Information:  Patient not medically stable for discharge, fever this morning.  No PICC line placement today per MD.      Plan remains for [facility]-private pay until insurance can be run on 10/3.      Spoke with Mya Calderon, [facility] liaison.  She will continue to watch for clinical progress and provide teach to patient/family over weekend if plan to discharge.  CM to call Mya if needed @ [phone].      PLAN:  [facility]    Rut Clark MSN, RN  Inpatient Care Coordinator  [facility] [phone]  [facility] [phone]

## 2022-09-30 NOTE — PROGRESS NOTES
/80 (BP Location: Right arm)   Pulse 114   Temp (!) 101.2  F (38.4  C) (Oral)   Resp 18   Ht 1.829 m (6')   Wt 86.9 kg (191 lb 9.3 oz)   SpO2 99%   BMI 25.98 kg/m    Sepsis BPA triggered, lactic 0.5. Tylenol 650mg given for temp/pain. Web page elevated temp/lactic to Dr Fuentes.

## 2022-09-30 NOTE — PROGRESS NOTES
Patient walking to/from bathroom using cane and minimal assist. Immobilizer right LE with activity.  SAYDA drain removed, steri-stripped SAYDA insertion site and covered with dressing. Ace wrap to RLE.  Pain managed with PRN tylenol, ibuprofen and oxycodone, frequent icing to right knee.

## 2022-10-01 ENCOUNTER — APPOINTMENT (OUTPATIENT)
Dept: CT IMAGING | Facility: CLINIC | Age: 32
DRG: 488 | End: 2022-10-01
Attending: INTERNAL MEDICINE
Payer: COMMERCIAL

## 2022-10-01 ENCOUNTER — APPOINTMENT (OUTPATIENT)
Dept: ULTRASOUND IMAGING | Facility: CLINIC | Age: 32
DRG: 488 | End: 2022-10-01
Attending: INTERNAL MEDICINE
Payer: COMMERCIAL

## 2022-10-01 LAB
BACTERIA BLD CULT: NO GROWTH
BACTERIA SNV CULT: NO GROWTH
BASOPHILS # BLD AUTO: 0.1 10E3/UL (ref 0–0.2)
BASOPHILS NFR BLD AUTO: 1 %
CRP SERPL-MCNC: 222.72 MG/L
EOSINOPHIL # BLD AUTO: 0.1 10E3/UL (ref 0–0.7)
EOSINOPHIL NFR BLD AUTO: 1 %
ERYTHROCYTE [DISTWIDTH] IN BLOOD BY AUTOMATED COUNT: 13.1 % (ref 10–15)
HCT VFR BLD AUTO: 29.5 % (ref 40–53)
HGB BLD-MCNC: 9.9 G/DL (ref 13.3–17.7)
IMM GRANULOCYTES # BLD: 0.1 10E3/UL
IMM GRANULOCYTES NFR BLD: 1 %
LACTATE SERPL-SCNC: 1.2 MMOL/L (ref 0.7–2)
LYMPHOCYTES # BLD AUTO: 2 10E3/UL (ref 0.8–5.3)
LYMPHOCYTES NFR BLD AUTO: 16 %
MCH RBC QN AUTO: 28.9 PG (ref 26.5–33)
MCHC RBC AUTO-ENTMCNC: 33.6 G/DL (ref 31.5–36.5)
MCV RBC AUTO: 86 FL (ref 78–100)
MONOCYTES # BLD AUTO: 2.4 10E3/UL (ref 0–1.3)
MONOCYTES NFR BLD AUTO: 20 %
NEUTROPHILS # BLD AUTO: 7.8 10E3/UL (ref 1.6–8.3)
NEUTROPHILS NFR BLD AUTO: 61 %
PLATELET # BLD AUTO: 597 10E3/UL (ref 150–450)
RBC # BLD AUTO: 3.43 10E6/UL (ref 4.4–5.9)
WBC # BLD AUTO: 12.2 10E3/UL (ref 4–11)
WBC # BLD AUTO: ABNORMAL 10*3/UL

## 2022-10-01 PROCEDURE — 120N000001 HC R&B MED SURG/OB

## 2022-10-01 PROCEDURE — 258N000003 HC RX IP 258 OP 636: Performed by: INTERNAL MEDICINE

## 2022-10-01 PROCEDURE — 93970 EXTREMITY STUDY: CPT

## 2022-10-01 PROCEDURE — 87040 BLOOD CULTURE FOR BACTERIA: CPT | Performed by: INTERNAL MEDICINE

## 2022-10-01 PROCEDURE — 70491 CT SOFT TISSUE NECK W/DYE: CPT

## 2022-10-01 PROCEDURE — 70460 CT HEAD/BRAIN W/DYE: CPT

## 2022-10-01 PROCEDURE — 83605 ASSAY OF LACTIC ACID: CPT | Performed by: INTERNAL MEDICINE

## 2022-10-01 PROCEDURE — 250N000009 HC RX 250: Performed by: INTERNAL MEDICINE

## 2022-10-01 PROCEDURE — 86140 C-REACTIVE PROTEIN: CPT | Performed by: INTERNAL MEDICINE

## 2022-10-01 PROCEDURE — 250N000013 HC RX MED GY IP 250 OP 250 PS 637: Performed by: INTERNAL MEDICINE

## 2022-10-01 PROCEDURE — 250N000011 HC RX IP 250 OP 636: Performed by: INTERNAL MEDICINE

## 2022-10-01 PROCEDURE — 74177 CT ABD & PELVIS W/CONTRAST: CPT

## 2022-10-01 PROCEDURE — 36415 COLL VENOUS BLD VENIPUNCTURE: CPT | Performed by: INTERNAL MEDICINE

## 2022-10-01 PROCEDURE — 250N000013 HC RX MED GY IP 250 OP 250 PS 637: Performed by: ORTHOPAEDIC SURGERY

## 2022-10-01 PROCEDURE — 99233 SBSQ HOSP IP/OBS HIGH 50: CPT | Performed by: INTERNAL MEDICINE

## 2022-10-01 PROCEDURE — 85027 COMPLETE CBC AUTOMATED: CPT | Performed by: INTERNAL MEDICINE

## 2022-10-01 RX ORDER — SODIUM CHLORIDE 9 MG/ML
INJECTION, SOLUTION INTRAVENOUS CONTINUOUS
Status: ACTIVE | OUTPATIENT
Start: 2022-10-01 | End: 2022-10-01

## 2022-10-01 RX ORDER — IOPAMIDOL 755 MG/ML
50 INJECTION, SOLUTION INTRAVASCULAR ONCE
Status: COMPLETED | OUTPATIENT
Start: 2022-10-01 | End: 2022-10-01

## 2022-10-01 RX ORDER — IOPAMIDOL 755 MG/ML
25 INJECTION, SOLUTION INTRAVASCULAR ONCE
Status: COMPLETED | OUTPATIENT
Start: 2022-10-01 | End: 2022-10-01

## 2022-10-01 RX ADMIN — ASPIRIN 81 MG: 81 TABLET, COATED ORAL at 08:36

## 2022-10-01 RX ADMIN — ACETAMINOPHEN 650 MG: 325 TABLET, FILM COATED ORAL at 13:51

## 2022-10-01 RX ADMIN — SENNOSIDES AND DOCUSATE SODIUM 1 TABLET: 50; 8.6 TABLET ORAL at 08:36

## 2022-10-01 RX ADMIN — IOPAMIDOL 25 ML: 755 INJECTION, SOLUTION INTRAVENOUS at 16:19

## 2022-10-01 RX ADMIN — IOPAMIDOL 50 ML: 755 INJECTION, SOLUTION INTRAVENOUS at 16:19

## 2022-10-01 RX ADMIN — ASPIRIN 81 MG: 81 TABLET, COATED ORAL at 20:32

## 2022-10-01 RX ADMIN — OXYCODONE HYDROCHLORIDE 5 MG: 5 TABLET ORAL at 18:02

## 2022-10-01 RX ADMIN — IBUPROFEN 400 MG: 400 TABLET ORAL at 02:57

## 2022-10-01 RX ADMIN — SODIUM CHLORIDE 100 ML: 9 INJECTION, SOLUTION INTRAVENOUS at 16:18

## 2022-10-01 RX ADMIN — CEFTRIAXONE SODIUM 2 G: 2 INJECTION, POWDER, FOR SOLUTION INTRAMUSCULAR; INTRAVENOUS at 13:51

## 2022-10-01 RX ADMIN — IBUPROFEN 400 MG: 400 TABLET ORAL at 14:54

## 2022-10-01 RX ADMIN — SENNOSIDES AND DOCUSATE SODIUM 1 TABLET: 50; 8.6 TABLET ORAL at 20:32

## 2022-10-01 RX ADMIN — POLYETHYLENE GLYCOL 3350 17 G: 17 POWDER, FOR SOLUTION ORAL at 08:36

## 2022-10-01 RX ADMIN — SODIUM CHLORIDE: 9 INJECTION, SOLUTION INTRAVENOUS at 18:05

## 2022-10-01 RX ADMIN — OXYCODONE HYDROCHLORIDE 10 MG: 5 TABLET ORAL at 08:39

## 2022-10-01 ASSESSMENT — ACTIVITIES OF DAILY LIVING (ADL)
ADLS_ACUITY_SCORE: 40
ADLS_ACUITY_SCORE: 42
ADLS_ACUITY_SCORE: 40
ADLS_ACUITY_SCORE: 42

## 2022-10-01 NOTE — PROGRESS NOTES
Patient spiked another fever, re-checked not as high as reported to writer see flowsheet  Patient received Tylenol and Oxycodone for discomfort.  Writer looked at dressing on right knee under ace wrap and marked drainage; (outlined)  Patient up independently using cane  Ice to right knee  Left chest dressing C, D, I

## 2022-10-01 NOTE — PROGRESS NOTES
[facility]    Hospitalist Progress Note    Date of Service (when I saw the patient): 10/01/2022    Assessment & Plan   Sanket Guerrero is a 32 year old male who was admitted on 9/25/2022 with several days of pain and swelling of the right knee.    Septic left sternoclavicular joint  Septic right knee  S/p right knee washout 9/26    Left sternoclavicular joint, left shoulder and right knee inflammatory process. Right knee with 353k WBC though only 24% neutrophils and no crystals. Concern for septic knee with that WBC count. CRP also very high 310. S/p right knee washout today 9/26.    - Started on empiric treatment with cefepime and vancomycin on 9/26 await cultures    - Change to ceftriaxone 2 g q 24 hrs on 9/27   - GC/Chlamydia urine PCR is negative   - Sternoclavicular joint aspiration obtained intraoperatively NGTD   - Nasal PCR for MRSA    - Check Lymes DNA PCR   - Synovial fluid cultures from R knee and L clavicle both growing Strep agalactiae   - Patient to go to OR for I&D    - Blood cultures 9/25, 9/26, 9/27, 9/28, 9/29 NGTD   - TTE negative for endocarditis.   - I&D of L sternoclavicular joint and repeat I&D R knee on 9/28   - Discussed with ID from [facility], recommending IV Rocephin and follow up in ID clinic on 10/5 or 10/7   - Patient with fever on AM of 9/30, repeat blood cultures x 2 drawn   - Febrile on PM of 9/30 and again AM of 10/1, blood culture x 1 drawn   - Discussed with [facility] ID, recommend CT head, neck, chest, abd/pelvis; will order Doppler of BLE.   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  Patient felt these were often associated with episodes of beer drinking.  However he drinks beer frequently so difficult to say at that the really the etiology.  Usually does not have pain or as large effusions  this time.  He always felt it was gout though never actually saw physician.  Would go away spontaneously.  This episode was unusual at that there is much more pain in the knee.  He has swelling also in the left shoulder this time and left sternoclavicular joint.  Patient denies risk factors for GC or chlamydia.  He and his longtime girlfriend were tested for STDs about 5 years ago and were negative and he had had joint effusions before and after that.   - CRP improving on antibiotics   - Consider antiinflammatory treatment if not resolving   - Referral to rheumatology at discharge for recurrent arthritis unless clearly infectious   - As this appears infectious, would hold on rheumatology referral     Diet: Advance Diet as Tolerated: Regular Diet Adult    DVT Prophylaxis: Low Risk/Ambulatory with no VTE prophylaxis indicated  [place] Catheter: Not present  Central Lines: None  Code Status: Full Code      Disposition: Expected discharge in days once .    Tano Fuentes MD    Interval History   The patient resting in bed.  He denies back or joint pain.  No rash or hives.    -Data reviewed today: I reviewed all new labs and imaging results over the last 24 hours. I personally reviewed no images or EKG's today.    Physical Exam   Temp: (!) 100.5  F (38.1  C) Temp src: Oral BP: 115/64 Pulse: 101   Resp: 19 SpO2: 98 % O2 Device: None (Room air)    Vitals:    09/25/22 1521 09/26/22 0303   Weight: 85.7 kg (189 lb) 86.9 kg (191 lb 9.3 oz)     Vital Signs with Ranges  Temp:  [97.5  F (36.4  C)-102.9  F (39.4  C)] 100.5  F (38.1  C)  Pulse:  [] 101  Resp:  [17-19] 19  BP: (115-128)/(64-78) 115/64  SpO2:  [95 %-100 %] 98 %  I/O last 3 completed shifts:  In: 1400 [P.O.:1400]  Out: 2100 [Urine:2100]    Gen: Well nourished, well developed, alert and oriented x 3, no acute distressed  HEENT: Atraumatic, normocephalic; sclera non-injected, anicterric; oral mucosa moist, no lesion, no exudate  Lungs: Clear to ausculation, no  wheezes, no rhonchi, no rales  Heart: Regular rate, regular rhythm, no gallops, no rubs, no murmurs  GI: Bowel sound normal, no hepatosplenomegaly, no masses, non-tender, non-distended, no guarding, no rebound tenderness  Lymph: No lymphadenopathy, no edema  Skin: No rashes, no chronic venous stasis, R knee in immobilizer, bandage over left sternoclavicular joint CDI  MS: No pain on palpation of cervical, thoracic, lumbar, or sacral spine    Medications     sodium chloride         aspirin  81 mg Oral BID     cefTRIAXone  2 g Intravenous Q24H     polyethylene glycol  17 g Oral Daily     senna-docusate  1 tablet Oral BID     sodium chloride (PF)  3 mL Intracatheter Q8H       Data   Recent Labs   Lab 10/01/22  0614 09/29/22  0616 09/28/22  0454 09/27/22  0454 09/26/22  0419   WBC 12.2*  --  8.2 8.2 11.9*   HGB 9.9*  --  10.6* 10.4* 11.8*   MCV 86  --  87 87 85   *  --  412 280 226   NA  --   --  141 141 135*   POTASSIUM  --   --  4.5 4.5 4.0   CHLORIDE  --   --  103 105 99   CO2  --   --  28 27 26   BUN  --   --  5.2* 7.3 9.6   CR  --  0.82 0.81 0.87 0.81   ANIONGAP  --   --  10 9 10   JOCELYNE  --   --  8.8 8.2* 8.7   GLC  --   --  113* 115* 126*   ALBUMIN  --   --  3.0* 2.7*  --    PROTTOTAL  --   --  6.8 6.2*  --    BILITOTAL  --   --  0.7 1.2  --    ALKPHOS  --   --  132* 102  --    ALT  --   --  86* 92*  --    AST  --   --  60* 100*  --        No results found for this or any previous visit (from the past 24 hour(s)).

## 2022-10-01 NOTE — PROGRESS NOTES
/65 (BP Location: Left arm)   Pulse 118   Temp (!) 102.9  F (39.4  C) (Oral)   Resp 18   Ht 1.829 m (6')   Wt 86.9 kg (191 lb 9.3 oz)   SpO2 100%   BMI 25.98 kg/m     Patient febrile even after having tylenol 650 mg at around 2 pm today. Skin feels warm, right knee assessed and no strike through drainage on dressings, right knee may be a little more swollen than this morning when writer assessed. Sepsis BPA fired and a lactic will be draw. Dr Fuentes updated with findings.

## 2022-10-01 NOTE — PROGRESS NOTES
[facility] Orthopaedics Progress Note      Post-operative Day: 3 Days Post-Op    Procedure(s):  Right IRRIGATION AND DEBRIDEMENT, KNEE, ARTHROSCOPIC  Open IRRIGATION AND DEBRIDEMENT of Sternal Clavicular Joint Left      Subjective:    Patient reports improved pain in the left shoulder. States pain is mild and mostly with movement. Pain in his right knee is also improving and he has decreased swelling. Denies numbness or tingling in UE or LE.     Denies feeling feverish since 2 days ago.     Pain: minimal  Chest pain, SOB:  No      Objective:  Blood pressure 121/75, pulse 93, temperature 98.3  F (36.8  C), temperature source Oral, resp. rate 18, height 1.829 m (6'), weight 86.9 kg (191 lb 9.3 oz), SpO2 98 %.    Patient Vitals for the past 24 hrs:   BP Temp Temp src Pulse Resp SpO2   10/01/22 0657 121/75 98.3  F (36.8  C) Oral 93 18 98 %   10/01/22 0255 -- 100.4  F (38  C) Oral -- -- --   09/30/22 2300 -- (!) 100.9  F (38.3  C) Axillary -- -- --   09/30/22 2254 -- 97.5  F (36.4  C) -- -- -- --   09/30/22 2242 126/78 (!) 102.3  F (39.1  C) Oral 87 -- 95 %   09/30/22 1445 126/77 99.2  F (37.3  C) Oral 114 18 99 %       Wt Readings from Last 4 Encounters:   09/26/22 86.9 kg (191 lb 9.3 oz)       Gen: A&O x 3. NAD. Appears comfortable.   Wound status: Left S.C joint dressings in place, CDI with minimal dried drainage. TTP surrounding dressings. No erythema or warmth noted around dressing.    Right knee covered with ACE wrap and soft dressing. No drainage. No erythema or warmth noted around dressing.   Mild swelling about right knee.   Sensation and function of UE and LE intact bilaterally. Left hand warm and well perfused. Right foot warm and well perfused.   Post op dressing intact. Yes  Calf tenderness: Bilateral  No    Pertinent Labs   Lab Results: personally reviewed.     Recent Labs   Lab Test 10/01/22  0614 09/30/22  0601 09/28/22  0454 09/27/22  0454 09/26/22  0419   HGB 9.9*  --  10.6* 10.4*  11.8*   HCT 29.5*  --  32.2* 31.3* 34.8*   MCV 86  --  87 87 85   *  --  412 280 226   NA  --   --  141 141 135*   .72* 213.18* 216.87* 269.53*  --        Plan: Continue cares and rehabilitation            Anticoagulation protocol: scoanticoagulationlist2: Aspirin 81 mg BID  X 30 days.             Pain medications:  scopainmedication: oxycodone and tylenol            Weight bearing status:  WBAT RLE, NWB LUE.             Disposition: IV antibiotics per Dr. Fuentes; IV Rocephin per ID. Ortho will continue to follow until discharge.     Report completed by:  JANICE OSORIO PA-C  Date: 10/1/2022  Time: 11:15 AM

## 2022-10-02 ENCOUNTER — ANESTHESIA (OUTPATIENT)
Dept: SURGERY | Facility: CLINIC | Age: 32
DRG: 488 | End: 2022-10-02
Payer: COMMERCIAL

## 2022-10-02 ENCOUNTER — ANESTHESIA EVENT (OUTPATIENT)
Dept: SURGERY | Facility: CLINIC | Age: 32
DRG: 488 | End: 2022-10-02
Payer: COMMERCIAL

## 2022-10-02 LAB
BACTERIA BLD CULT: NO GROWTH
CRP SERPL-MCNC: 217.99 MG/L
ERYTHROCYTE [DISTWIDTH] IN BLOOD BY AUTOMATED COUNT: 13.2 % (ref 10–15)
GRAM STAIN RESULT: NORMAL
GRAM STAIN RESULT: NORMAL
HCT VFR BLD AUTO: 29.6 % (ref 40–53)
HGB BLD-MCNC: 9.9 G/DL (ref 13.3–17.7)
MCH RBC QN AUTO: 29.1 PG (ref 26.5–33)
MCHC RBC AUTO-ENTMCNC: 33.4 G/DL (ref 31.5–36.5)
MCV RBC AUTO: 87 FL (ref 78–100)
PLATELET # BLD AUTO: 676 10E3/UL (ref 150–450)
RBC # BLD AUTO: 3.4 10E6/UL (ref 4.4–5.9)
WBC # BLD AUTO: 12.3 10E3/UL (ref 4–11)

## 2022-10-02 PROCEDURE — 258N000001 HC RX 258: Performed by: STUDENT IN AN ORGANIZED HEALTH CARE EDUCATION/TRAINING PROGRAM

## 2022-10-02 PROCEDURE — 272N000001 HC OR GENERAL SUPPLY STERILE: Performed by: STUDENT IN AN ORGANIZED HEALTH CARE EDUCATION/TRAINING PROGRAM

## 2022-10-02 PROCEDURE — 250N000011 HC RX IP 250 OP 636: Performed by: NURSE ANESTHETIST, CERTIFIED REGISTERED

## 2022-10-02 PROCEDURE — 258N000003 HC RX IP 258 OP 636: Performed by: NURSE ANESTHETIST, CERTIFIED REGISTERED

## 2022-10-02 PROCEDURE — 99232 SBSQ HOSP IP/OBS MODERATE 35: CPT | Performed by: INTERNAL MEDICINE

## 2022-10-02 PROCEDURE — 250N000009 HC RX 250: Performed by: NURSE ANESTHETIST, CERTIFIED REGISTERED

## 2022-10-02 PROCEDURE — 36415 COLL VENOUS BLD VENIPUNCTURE: CPT | Performed by: INTERNAL MEDICINE

## 2022-10-02 PROCEDURE — 250N000009 HC RX 250: Performed by: STUDENT IN AN ORGANIZED HEALTH CARE EDUCATION/TRAINING PROGRAM

## 2022-10-02 PROCEDURE — 250N000013 HC RX MED GY IP 250 OP 250 PS 637: Performed by: ORTHOPAEDIC SURGERY

## 2022-10-02 PROCEDURE — 87075 CULTR BACTERIA EXCEPT BLOOD: CPT | Performed by: STUDENT IN AN ORGANIZED HEALTH CARE EDUCATION/TRAINING PROGRAM

## 2022-10-02 PROCEDURE — 120N000001 HC R&B MED SURG/OB

## 2022-10-02 PROCEDURE — 85014 HEMATOCRIT: CPT | Performed by: INTERNAL MEDICINE

## 2022-10-02 PROCEDURE — 250N000025 HC SEVOFLURANE, PER MIN: Performed by: STUDENT IN AN ORGANIZED HEALTH CARE EDUCATION/TRAINING PROGRAM

## 2022-10-02 PROCEDURE — 87205 SMEAR GRAM STAIN: CPT | Performed by: STUDENT IN AN ORGANIZED HEALTH CARE EDUCATION/TRAINING PROGRAM

## 2022-10-02 PROCEDURE — 370N000017 HC ANESTHESIA TECHNICAL FEE, PER MIN: Performed by: STUDENT IN AN ORGANIZED HEALTH CARE EDUCATION/TRAINING PROGRAM

## 2022-10-02 PROCEDURE — 258N000003 HC RX IP 258 OP 636: Performed by: PHYSICIAN ASSISTANT

## 2022-10-02 PROCEDURE — 250N000011 HC RX IP 250 OP 636: Performed by: INTERNAL MEDICINE

## 2022-10-02 PROCEDURE — 87070 CULTURE OTHR SPECIMN AEROBIC: CPT | Performed by: STUDENT IN AN ORGANIZED HEALTH CARE EDUCATION/TRAINING PROGRAM

## 2022-10-02 PROCEDURE — 0SBC4ZZ EXCISION OF RIGHT KNEE JOINT, PERCUTANEOUS ENDOSCOPIC APPROACH: ICD-10-PCS | Performed by: STUDENT IN AN ORGANIZED HEALTH CARE EDUCATION/TRAINING PROGRAM

## 2022-10-02 PROCEDURE — 86140 C-REACTIVE PROTEIN: CPT | Performed by: INTERNAL MEDICINE

## 2022-10-02 PROCEDURE — 710N000009 HC RECOVERY PHASE 1, LEVEL 1, PER MIN: Performed by: STUDENT IN AN ORGANIZED HEALTH CARE EDUCATION/TRAINING PROGRAM

## 2022-10-02 PROCEDURE — 360N000076 HC SURGERY LEVEL 3, PER MIN: Performed by: STUDENT IN AN ORGANIZED HEALTH CARE EDUCATION/TRAINING PROGRAM

## 2022-10-02 PROCEDURE — 250N000013 HC RX MED GY IP 250 OP 250 PS 637: Performed by: INTERNAL MEDICINE

## 2022-10-02 PROCEDURE — 250N000011 HC RX IP 250 OP 636: Performed by: ORTHOPAEDIC SURGERY

## 2022-10-02 PROCEDURE — 250N000013 HC RX MED GY IP 250 OP 250 PS 637: Performed by: NURSE ANESTHETIST, CERTIFIED REGISTERED

## 2022-10-02 PROCEDURE — 0RBF0ZZ EXCISION OF LEFT STERNOCLAVICULAR JOINT, OPEN APPROACH: ICD-10-PCS | Performed by: STUDENT IN AN ORGANIZED HEALTH CARE EDUCATION/TRAINING PROGRAM

## 2022-10-02 RX ORDER — LIDOCAINE HYDROCHLORIDE 10 MG/ML
INJECTION, SOLUTION INFILTRATION; PERINEURAL PRN
Status: DISCONTINUED | OUTPATIENT
Start: 2022-10-02 | End: 2022-10-02

## 2022-10-02 RX ORDER — ONDANSETRON 4 MG/1
4 TABLET, ORALLY DISINTEGRATING ORAL EVERY 30 MIN PRN
Status: DISCONTINUED | OUTPATIENT
Start: 2022-10-02 | End: 2022-10-02 | Stop reason: HOSPADM

## 2022-10-02 RX ORDER — LIDOCAINE HYDROCHLORIDE AND EPINEPHRINE 10; 10 MG/ML; UG/ML
INJECTION, SOLUTION INFILTRATION; PERINEURAL PRN
Status: DISCONTINUED | OUTPATIENT
Start: 2022-10-02 | End: 2022-10-02 | Stop reason: HOSPADM

## 2022-10-02 RX ORDER — OXYCODONE HYDROCHLORIDE 5 MG/1
10 TABLET ORAL EVERY 4 HOURS PRN
Status: DISCONTINUED | OUTPATIENT
Start: 2022-10-02 | End: 2022-10-02 | Stop reason: HOSPADM

## 2022-10-02 RX ORDER — HYDROXYZINE HYDROCHLORIDE 50 MG/1
50 TABLET, FILM COATED ORAL EVERY 6 HOURS PRN
Status: DISCONTINUED | OUTPATIENT
Start: 2022-10-02 | End: 2022-10-05 | Stop reason: HOSPADM

## 2022-10-02 RX ORDER — ONDANSETRON 2 MG/ML
INJECTION INTRAMUSCULAR; INTRAVENOUS PRN
Status: DISCONTINUED | OUTPATIENT
Start: 2022-10-02 | End: 2022-10-02

## 2022-10-02 RX ORDER — SODIUM CHLORIDE, SODIUM LACTATE, POTASSIUM CHLORIDE, CALCIUM CHLORIDE 600; 310; 30; 20 MG/100ML; MG/100ML; MG/100ML; MG/100ML
INJECTION, SOLUTION INTRAVENOUS CONTINUOUS PRN
Status: DISCONTINUED | OUTPATIENT
Start: 2022-10-02 | End: 2022-10-02

## 2022-10-02 RX ORDER — ONDANSETRON 2 MG/ML
4 INJECTION INTRAMUSCULAR; INTRAVENOUS EVERY 30 MIN PRN
Status: DISCONTINUED | OUTPATIENT
Start: 2022-10-02 | End: 2022-10-02 | Stop reason: HOSPADM

## 2022-10-02 RX ORDER — HYDROMORPHONE HCL IN WATER/PF 6 MG/30 ML
0.4 PATIENT CONTROLLED ANALGESIA SYRINGE INTRAVENOUS EVERY 5 MIN PRN
Status: DISCONTINUED | OUTPATIENT
Start: 2022-10-02 | End: 2022-10-02 | Stop reason: HOSPADM

## 2022-10-02 RX ORDER — HYDROXYZINE HYDROCHLORIDE 25 MG/1
25 TABLET, FILM COATED ORAL EVERY 6 HOURS PRN
Status: DISCONTINUED | OUTPATIENT
Start: 2022-10-02 | End: 2022-10-05 | Stop reason: HOSPADM

## 2022-10-02 RX ORDER — SODIUM CHLORIDE, SODIUM LACTATE, POTASSIUM CHLORIDE, CALCIUM CHLORIDE 600; 310; 30; 20 MG/100ML; MG/100ML; MG/100ML; MG/100ML
INJECTION, SOLUTION INTRAVENOUS CONTINUOUS
Status: DISCONTINUED | OUTPATIENT
Start: 2022-10-02 | End: 2022-10-02 | Stop reason: HOSPADM

## 2022-10-02 RX ORDER — MEPERIDINE HYDROCHLORIDE 25 MG/ML
INJECTION INTRAMUSCULAR; INTRAVENOUS; SUBCUTANEOUS PRN
Status: DISCONTINUED | OUTPATIENT
Start: 2022-10-02 | End: 2022-10-02

## 2022-10-02 RX ORDER — MEPERIDINE HYDROCHLORIDE 25 MG/ML
12.5 INJECTION INTRAMUSCULAR; INTRAVENOUS; SUBCUTANEOUS
Status: DISCONTINUED | OUTPATIENT
Start: 2022-10-02 | End: 2022-10-02 | Stop reason: HOSPADM

## 2022-10-02 RX ORDER — KETAMINE HYDROCHLORIDE 10 MG/ML
INJECTION INTRAMUSCULAR; INTRAVENOUS PRN
Status: DISCONTINUED | OUTPATIENT
Start: 2022-10-02 | End: 2022-10-02

## 2022-10-02 RX ORDER — DIMENHYDRINATE 50 MG/ML
25 INJECTION, SOLUTION INTRAMUSCULAR; INTRAVENOUS
Status: DISCONTINUED | OUTPATIENT
Start: 2022-10-02 | End: 2022-10-02 | Stop reason: HOSPADM

## 2022-10-02 RX ORDER — FENTANYL CITRATE 50 UG/ML
INJECTION, SOLUTION INTRAMUSCULAR; INTRAVENOUS PRN
Status: DISCONTINUED | OUTPATIENT
Start: 2022-10-02 | End: 2022-10-02

## 2022-10-02 RX ORDER — MAGNESIUM HYDROXIDE 1200 MG/15ML
LIQUID ORAL PRN
Status: DISCONTINUED | OUTPATIENT
Start: 2022-10-02 | End: 2022-10-02 | Stop reason: HOSPADM

## 2022-10-02 RX ORDER — FENTANYL CITRATE 50 UG/ML
50 INJECTION, SOLUTION INTRAMUSCULAR; INTRAVENOUS EVERY 5 MIN PRN
Status: DISCONTINUED | OUTPATIENT
Start: 2022-10-02 | End: 2022-10-02 | Stop reason: HOSPADM

## 2022-10-02 RX ORDER — PROPOFOL 10 MG/ML
INJECTION, EMULSION INTRAVENOUS PRN
Status: DISCONTINUED | OUTPATIENT
Start: 2022-10-02 | End: 2022-10-02

## 2022-10-02 RX ORDER — GLYCOPYRROLATE 0.2 MG/ML
INJECTION, SOLUTION INTRAMUSCULAR; INTRAVENOUS PRN
Status: DISCONTINUED | OUTPATIENT
Start: 2022-10-02 | End: 2022-10-02

## 2022-10-02 RX ADMIN — FENTANYL CITRATE 50 MCG: 50 INJECTION, SOLUTION INTRAMUSCULAR; INTRAVENOUS at 13:22

## 2022-10-02 RX ADMIN — HYDROXYZINE HYDROCHLORIDE 25 MG: 25 TABLET, FILM COATED ORAL at 15:46

## 2022-10-02 RX ADMIN — HYDROMORPHONE HYDROCHLORIDE 0.4 MG: 0.2 INJECTION, SOLUTION INTRAMUSCULAR; INTRAVENOUS; SUBCUTANEOUS at 22:37

## 2022-10-02 RX ADMIN — SENNOSIDES AND DOCUSATE SODIUM 1 TABLET: 50; 8.6 TABLET ORAL at 19:53

## 2022-10-02 RX ADMIN — HYDROMORPHONE HYDROCHLORIDE 0.4 MG: 0.2 INJECTION, SOLUTION INTRAMUSCULAR; INTRAVENOUS; SUBCUTANEOUS at 14:59

## 2022-10-02 RX ADMIN — FENTANYL CITRATE 50 MCG: 50 INJECTION, SOLUTION INTRAMUSCULAR; INTRAVENOUS at 13:07

## 2022-10-02 RX ADMIN — LIDOCAINE HYDROCHLORIDE 100 MG: 10 INJECTION, SOLUTION INFILTRATION; PERINEURAL at 12:56

## 2022-10-02 RX ADMIN — PROPOFOL 200 MG: 10 INJECTION, EMULSION INTRAVENOUS at 12:56

## 2022-10-02 RX ADMIN — OXYCODONE HYDROCHLORIDE 10 MG: 5 TABLET ORAL at 15:06

## 2022-10-02 RX ADMIN — ACETAMINOPHEN 650 MG: 325 TABLET, FILM COATED ORAL at 18:41

## 2022-10-02 RX ADMIN — MEPERIDINE HYDROCHLORIDE 25 MG: 25 INJECTION, SOLUTION INTRAMUSCULAR; INTRAVENOUS; SUBCUTANEOUS at 13:20

## 2022-10-02 RX ADMIN — CEFTRIAXONE SODIUM 2 G: 2 INJECTION, POWDER, FOR SOLUTION INTRAMUSCULAR; INTRAVENOUS at 12:46

## 2022-10-02 RX ADMIN — OXYCODONE HYDROCHLORIDE 5 MG: 5 TABLET ORAL at 02:34

## 2022-10-02 RX ADMIN — ONDANSETRON 4 MG: 2 INJECTION INTRAMUSCULAR; INTRAVENOUS at 12:56

## 2022-10-02 RX ADMIN — KETAMINE HYDROCHLORIDE 10 MG: 10 INJECTION, SOLUTION INTRAMUSCULAR; INTRAVENOUS at 13:24

## 2022-10-02 RX ADMIN — SODIUM CHLORIDE 1000 ML: 9 INJECTION, SOLUTION INTRAVENOUS at 19:01

## 2022-10-02 RX ADMIN — KETAMINE HYDROCHLORIDE 20 MG: 10 INJECTION, SOLUTION INTRAMUSCULAR; INTRAVENOUS at 13:39

## 2022-10-02 RX ADMIN — IBUPROFEN 400 MG: 400 TABLET ORAL at 22:37

## 2022-10-02 RX ADMIN — KETAMINE HYDROCHLORIDE 20 MG: 10 INJECTION, SOLUTION INTRAMUSCULAR; INTRAVENOUS at 13:23

## 2022-10-02 RX ADMIN — ASPIRIN 81 MG: 81 TABLET, COATED ORAL at 19:53

## 2022-10-02 RX ADMIN — SODIUM CHLORIDE, POTASSIUM CHLORIDE, SODIUM LACTATE AND CALCIUM CHLORIDE: 600; 310; 30; 20 INJECTION, SOLUTION INTRAVENOUS at 13:01

## 2022-10-02 RX ADMIN — HYDROMORPHONE HYDROCHLORIDE 0.4 MG: 0.2 INJECTION, SOLUTION INTRAMUSCULAR; INTRAVENOUS; SUBCUTANEOUS at 19:54

## 2022-10-02 RX ADMIN — MIDAZOLAM 2 MG: 1 INJECTION INTRAMUSCULAR; INTRAVENOUS at 12:49

## 2022-10-02 RX ADMIN — HYDROMORPHONE HYDROCHLORIDE 0.4 MG: 0.2 INJECTION, SOLUTION INTRAMUSCULAR; INTRAVENOUS; SUBCUTANEOUS at 17:32

## 2022-10-02 RX ADMIN — GLYCOPYRROLATE 0.1 MG: 0.2 INJECTION, SOLUTION INTRAMUSCULAR; INTRAVENOUS at 12:52

## 2022-10-02 RX ADMIN — FENTANYL CITRATE 100 MCG: 50 INJECTION, SOLUTION INTRAMUSCULAR; INTRAVENOUS at 12:49

## 2022-10-02 RX ADMIN — IBUPROFEN 400 MG: 400 TABLET ORAL at 05:06

## 2022-10-02 RX ADMIN — GLYCOPYRROLATE 0.1 MG: 0.2 INJECTION, SOLUTION INTRAMUSCULAR; INTRAVENOUS at 12:49

## 2022-10-02 RX ADMIN — MEPERIDINE HYDROCHLORIDE 25 MG: 25 INJECTION, SOLUTION INTRAMUSCULAR; INTRAVENOUS; SUBCUTANEOUS at 13:39

## 2022-10-02 ASSESSMENT — ACTIVITIES OF DAILY LIVING (ADL)
ADLS_ACUITY_SCORE: 42
ADLS_ACUITY_SCORE: 41
ADLS_ACUITY_SCORE: 41
ADLS_ACUITY_SCORE: 43
ADLS_ACUITY_SCORE: 42

## 2022-10-02 NOTE — ANESTHESIA PREPROCEDURE EVALUATION
Anesthesia Pre-Procedure Evaluation    Patient: Sanket Guerrero   MRN: [id number] : 1990        Procedure : Procedure(s):  ARTHROSCOPY, KNEE, WITH INCISION AND DRAINAGE          No past medical history on file.   Past Surgical History:   Procedure Laterality Date     ARTHROSCOPY KNEE INCISION AND DRAINAGE Bilateral 2022    Procedure: Right knee arthroscopic irrigation and debridement, partial synovectomy and left sternoclavicular joint needle aspiration;  Surgeon: Jovani Hahn MD;  Location: [facility]     ARTHROSCOPY KNEE IRRIGATION AND DEBRIDEMENT Right 2022    Procedure: Right IRRIGATION AND DEBRIDEMENT, KNEE, ARTHROSCOPIC;  Surgeon: Jovani Hahn MD;  Location: [facility]     IRRIGATION AND DEBRIDEMENT NECK, COMBINED Left 2022    Procedure: Open IRRIGATION AND DEBRIDEMENT of Sternal Clavicular Joint Left;  Surgeon: Jovani Hahn MD;  Location: [facility]      No Known Allergies   Social History     Tobacco Use     Smoking status: Not on file     Smokeless tobacco: Not on file   Substance Use Topics     Alcohol use: Not on file      Wt Readings from Last 1 Encounters:   22 86.9 kg (191 lb 9.3 oz)        Anesthesia Evaluation   Pt has had prior anesthetic. Type: General.    No history of anesthetic complications       ROS/MED HX  ENT/Pulmonary:  - neg pulmonary ROS     Neurologic:  - neg neurologic ROS     Cardiovascular:  - neg cardiovascular ROS   (+) -----Previous cardiac testing   Echo: Date:  Results:  Interpretation Summary     Left ventricular systolic function is normal.  The visual ejection fraction is 60-65%.  The right ventricle is normal in structure, function and size.  Valve structures without significant dysfunction. No apparent valvular  vegetations.  The inferior vena cava was normal in size with preserved respiratory  variability.  There is no pericardial effusion.     No prior study for comparison. Consider LINDA if clnically indicated to assess  for  endocarditis.  Stress Test: Date: Results:    ECG Reviewed: Date: Results:    Cath: Date: Results:      METS/Exercise Tolerance:     Hematologic:  - neg hematologic  ROS     Musculoskeletal:  - neg musculoskeletal ROS     GI/Hepatic:  - neg GI/hepatic ROS     Renal/Genitourinary:  - neg Renal ROS     Endo: Comment: overweight      Psychiatric/Substance Use:  - neg psychiatric ROS     Infectious Disease: Comment: R knee ongoing infection    (+) Recent Fever,     Malignancy:  - neg malignancy ROS     Other:  - neg other ROS          Physical Exam    Airway        Mallampati: I   TM distance: > 3 FB   Neck ROM: full   Mouth opening: > 3 cm    Respiratory Devices and Support         Dental  no notable dental history         Cardiovascular   cardiovascular exam normal          Pulmonary   pulmonary exam normal                OUTSIDE LABS:  CBC:   Lab Results   Component Value Date    WBC 12.3 (H) 10/02/2022    WBC 12.2 (H) 10/01/2022    HGB 9.9 (L) 10/02/2022    HGB 9.9 (L) 10/01/2022    HCT 29.6 (L) 10/02/2022    HCT 29.5 (L) 10/01/2022     (H) 10/02/2022     (H) 10/01/2022     BMP:   Lab Results   Component Value Date     09/28/2022     09/27/2022    POTASSIUM 4.5 09/28/2022    POTASSIUM 4.5 09/27/2022    CHLORIDE 103 09/28/2022    CHLORIDE 105 09/27/2022    CO2 28 09/28/2022    CO2 27 09/27/2022    BUN 5.2 (L) 09/28/2022    BUN 7.3 09/27/2022    CR 0.82 09/29/2022    CR 0.81 09/28/2022     (H) 09/28/2022     (H) 09/27/2022     COAGS: No results found for: PTT, INR, FIBR  POC: No results found for: BGM, HCG, HCGS  HEPATIC:   Lab Results   Component Value Date    ALBUMIN 3.0 (L) 09/28/2022    PROTTOTAL 6.8 09/28/2022    ALT 86 (H) 09/28/2022    AST 60 (H) 09/28/2022    ALKPHOS 132 (H) 09/28/2022    BILITOTAL 0.7 09/28/2022     OTHER:   Lab Results   Component Value Date    LACT 1.2 10/01/2022    JOCELYNE 8.8 09/28/2022    .99 (H) 10/02/2022    SED 73 (H) 09/25/2022        Anesthesia Plan    ASA Status:  2   NPO Status:  NPO Appropriate    Anesthesia Type: General.     - Airway: LMA   Induction: Intravenous.   Maintenance: Balanced.        Consents    Anesthesia Plan(s) and associated risks, benefits, and realistic alternatives discussed. Questions answered and patient/representative(s) expressed understanding.     - Discussed: Risks, Benefits and Alternatives for BOTH SEDATION and the PROCEDURE were discussed     - Discussed with:  Patient         Postoperative Care    Pain management: IV analgesics.   PONV prophylaxis: Ondansetron (or other 5HT-3), Background Propofol Infusion     Comments:                    RESHMA Tillman CRNA

## 2022-10-02 NOTE — ANESTHESIA POSTPROCEDURE EVALUATION
Patient: Sanket Guerrero    Procedure: Procedure(s):  IRRIGATION AND DEBRIDEMENT, KNEE, ARTHROSCOPIC RIGHT  Left Sternoclavicular Joint Irrigation And Debridement       Anesthesia Type:  General    Note:  Disposition: Inpatient   Postop Pain Control: Uneventful            Sign Out: Well controlled pain   PONV: No   Neuro/Psych: Uneventful            Sign Out: Acceptable/Baseline neuro status   Airway/Respiratory: Uneventful            Sign Out: Acceptable/Baseline resp. status   CV/Hemodynamics: Uneventful            Sign Out: Acceptable CV status; No obvious hypovolemia; No obvious fluid overload   Other NRE: NONE   DID A NON-ROUTINE EVENT OCCUR? No           Last vitals:  Vitals Value Taken Time   /100 10/02/22 1515   Temp 37.1  C (98.7  F) 10/02/22 1515   Pulse 101 10/02/22 1517   Resp 7 10/02/22 1517   SpO2 100 % 10/02/22 1520   Vitals shown include unvalidated device data.    Electronically Signed By: RESHMA Tillman CRNA  October 2, 2022  4:49 PM

## 2022-10-02 NOTE — OP NOTE
DATE OF SURGERY: 10/2/22  PREOPERATIVE DIAGNOSIS: Right knee septic arthritis, left sternoclavicular joint septic arthritis  POSTOPERATIVE DIAGNOSIS: Right knee septic arthritis, left sternoclavicular joint septic arthritis     PROCEDURE: Right knee arthroscopic irrigation and excisional debridement, left sternoclavicular joint open excisional debridement and irrigation     SURGEON: Zeke Vazquez MD  ASSISTANT: Adan Villagomez PA-C. A skilled assistant was necessary for this case to facilitate efficiency with 2 different procedures as well as to hold positioning of the knee to facilitate a thorough washout    ANESTHESIA:  General   TOURNIQUET TIME:   none  ESTIMATED BLOOD LOSS:  20 ml    SPECIMENS:  None.   DRAINS:  None.   COMPLICATIONS:  None apparent.     INTRAOPERATIVE FINDINGS:  Hematoma in the knee joint, no gross purulence at the sternoclavicular joint     IMPLANTS:  None     BRIEF HISTORY:  32 year old male with a recent irrigation and debridement of his right knee and his left SC joint.  The patient was treated with IV antibiotics after this, however, his inflammatory markers have been stable and not improving.  He is also continue to spike occasional fevers.  He had a lot of swelling and pain in the knee limiting his ability to mobilize.  There were no other joints concerning for septic arthritis on exam.  Risk benefits alternatives to repeat irrigation debridement were reviewed with the patient.  He elected to proceed with both irrigation debridement of his knee and his SC joint given that he be under anesthesia.    DESCRIPTION OF PROCEDURE:  The patient was identified in the preoperative holding area.  The informed consent was reviewed. The operative site was identified and marked. The patient was brought to the operating room and anesthesia was induced.  IV antibiotics were scheduled and given.  Surgical timeout was completed.    Sutures from the left SC joint were removed and tenotomy scissors used  to open the incision.  There was no gross purulence.  There is a small amount of fluid in the area.  Curette was used to gently perform excisional debridement of subcutaneous tissues and into the joint itself.  There were no areas of abscess or pockets of fluid that were identified.  The area was irrigated with bulb syringe and closed with a 3-0 Monocryl and 3-0 nylon suture.    Sutures were removed from the knee and the arthroscopic portals were reestablished.  The arthroscope was placed in the suprapatellar pouch and a outflow portal was placed through the superior lateral aspect of the knee.  Fluid was gathered from the knee and sent for cultures.  The knee was then thoroughly irrigated with 6 L of saline.  A shaver was used for some excisional debridement of scar tissue and hematoma.  The patient was noted to have no signs of purulent fluid however, there was a large amount of clot and scar from the previous operation.  This limited visualization of the knee joint.  The arthroscope was moved from the notch to the recesses to the suprapatellar pouch to thoroughly irrigate the knee, however, visualization was poor.  The doctor cautery was used to try and coagulate any bleeding which was visualized and some general coagulation of the synovial tissue.  The knee was lavaged to try and help stop bleeding.  Wounds were closed after all excess fluid was sucked out of the knee was suction.  20 cc of lidocaine with epinephrine was then injected in the knee to help with hemostasis as the hematoma seem to be a bigger problem than ongoing infection. Adaptic, gauze, ABD and a tight Ace wrap were then applied to the knee. All sponge and needle counts were correct at the end of the case.     POSTOPERATIVE PLAN:    Patient will return to the floor to continue antibiotics.  Recommend he discharge the hospital for ongoing treatment as no further aggressive management is indicated for this hospitalization.  He can follow-up in  clinic in 1 to 2 weeks for recheck of his symptoms and outpatient laboratory evaluation.  Recommend range of motion as tolerated for his left shoulder.  He should have range of motion and weightbearing as tolerated for the right lower extremity.  He can use knee immobilizer when he is up and about to protect the knee.      Zeke Vazquez MD

## 2022-10-02 NOTE — PROGRESS NOTES
[facility]    Hospital Medicine   Cross Cover Note  Date of Service: 10/2/2022     I was called due to recurrent fevers despite recent wash out and ongoing IV antibiotics.  Patient tachycardic, blood pressure normal.     Plan:  - 1L NS bolus  - Prn acetaminophen available  - Continue antibiotics without changes    Reviewed plan with Dr. Tano Fuentes, who agrees.       Diana Reyes PA-C  [facility]

## 2022-10-02 NOTE — ANESTHESIA CARE TRANSFER NOTE
Patient: Sanket Guerrero    Procedure: Procedure(s):  IRRIGATION AND DEBRIDEMENT, KNEE, ARTHROSCOPIC RIGHT  Left Sternoclavicular Joint Irrigation And Debridement       Diagnosis: Septic arthritis of knee, right (H) [M00.9]  Diagnosis Additional Information: No value filed.    Anesthesia Type:   General     Note:    Oropharynx: oropharynx clear of all foreign objects and spontaneously breathing  Level of Consciousness: drowsy  Oxygen Supplementation: room air    Independent Airway: airway patency satisfactory and stable  Dentition: dentition unchanged  Vital Signs Stable: post-procedure vital signs reviewed and stable  Report to RN Given: handoff report given  Patient transferred to: PACU    Handoff Report: Identifed the Patient, Identified the Reponsible Provider, Reviewed the pertinent medical history, Discussed the surgical course, Reviewed Intra-OP anesthesia mangement and issues during anesthesia, Set expectations for post-procedure period and Allowed opportunity for questions and acknowledgement of understanding      Vitals:  Vitals Value Taken Time   /106 10/02/22 1430   Temp 36.7  C (98.1  F) 10/02/22 1421   Pulse 112 10/02/22 1431   Resp 19 10/02/22 1431   SpO2 100 % 10/02/22 1431   Vitals shown include unvalidated device data.    Electronically Signed By: RESHMA Tillman CRNA  October 2, 2022  2:32 PM

## 2022-10-02 NOTE — PROGRESS NOTES
[facility] TRANSPORT NOTE  Data:   Reason for Transport: Surgery     Sanket Guerrero was transported to surgery via wheel chair at 1250.  Patient was accompanied by OR RN and anesthetist. Family was aware of reason for transport: yes, SO here with patient.     Action:  Report: given to Ranjana Gupta RN and Blanka anesthetist.     Response:  Patient's condition when transferred off unit was stable. .    Narda Reich RN

## 2022-10-02 NOTE — PROGRESS NOTES
Patient is alert and oriented. Patient denies pain at this time.  Patient was NPO from midnight on.  Patient had a low grade fever for the entirety of the shift.  Ice packs were utilized for knee pain as well as medications which were administered per the MAR.  Patient's fluid order was completed and patient was saline locked per the provider's orders.      Temp: 100.1  F (37.8  C) Temp src: Oral BP: 119/70 Pulse: 85   Resp: 16 SpO2: 100 % Height: 182.9 cm (6') Weight: 86.9 kg (191 lb 9.3 oz)  O2 Device: None (Room air) at

## 2022-10-02 NOTE — PLAN OF CARE
"Uses Immobilizer when up in room, requires A-1 to apply and remove. Ace drsg to RLE is CDI and drsg to L shoulder-chest area is CDI, pt explains that the drsg should not be removed until tomorrow. TCO round here and seeing the pt at this time.   Pt reports, \"feel good today\" and spoke of going home.   Did spike a temp yesterday, less than 24 hours ago.   Urine lg amounts, clear.   SO is here and in room, provides food and drinks for the patient. Noted some thick green liquid in a water bottle, \"sour fruit\". Staff noted yesterday items in room not from our dietary. Maybe, no significance, but pt did explain that he had food poisoning for three days that laid him up in bed at the time the RLE and LUE flared up and thought that it may be related.                       "

## 2022-10-02 NOTE — PROGRESS NOTES
[facility]    Hospitalist Progress Note    Date of Service (when I saw the patient): 10/02/2022    Assessment & Plan   Sanket Guerrero is a 32 year old male who was admitted on 9/25/2022 with several days of pain and swelling of the right knee.    Septic left sternoclavicular joint  Septic right knee  S/p right knee washout 9/26    Left sternoclavicular joint, left shoulder and right knee inflammatory process. Right knee with 353k WBC though only 24% neutrophils and no crystals. Concern for septic knee with that WBC count. CRP also very high 310. S/p right knee washout today 9/26.    - Started on empiric treatment with cefepime and vancomycin on 9/26 await cultures    - Change to ceftriaxone 2 g q 24 hrs on 9/27   - GC/Chlamydia urine PCR is negative   - Sternoclavicular joint aspiration obtained intraoperatively NGTD   - Nasal PCR for MRSA    - Check Lymes DNA PCR   - Synovial fluid cultures from R knee and L clavicle both growing Strep agalactiae   - Patient to go to OR for I&D    - Blood cultures 9/25, 9/26, 9/27, 9/28, 9/29, 9/30, 10/1 NGTD   - TTE negative for endocarditis.   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  Patient felt these were often associated with episodes of beer drinking.  However he drinks beer frequently so difficult to say  at that the really the etiology.  Usually does not have pain or as large effusions this time.  He always felt it was gout though never actually saw physician.  Would go away spontaneously.  This episode was unusual at that there is much more pain in the knee.  He has swelling also in the left shoulder this time and left sternoclavicular joint.  Patient denies risk factors for GC or chlamydia.  He and his longtime girlfriend were tested for STDs about 5 years ago and were negative and he had had joint effusions before and after that.   - CRP barely improved on antibiotics   - Consider antiinflammatory treatment if not resolving   - Referral to rheumatology at discharge for recurrent arthritis unless clearly infectious   - As this appears infectious, but consider rheumatology referral     Diet: Advance Diet as Tolerated: Regular Diet Adult    DVT Prophylaxis: Low Risk/Ambulatory with no VTE prophylaxis indicated  [place] Catheter: Not present  Central Lines: None  Code Status: Full Code      Disposition: Expected discharge in days once fever resolved.    Tano Fuentes MD    Interval History   The patient resting in bed.  He has no acute complaints.    -Data reviewed today: I reviewed all new labs and imaging results over the last 24 hours. I personally reviewed no images or EKG's today.    Physical Exam   Temp: 98.1  F (36.7  C) Temp src: Oral BP: 121/74 Pulse: 92   Resp: 16 SpO2: 99 % O2 Device: None (Room air)    Vitals:    09/25/22 1521 09/26/22 0303   Weight: 85.7 kg (189 lb) 86.9 kg (191 lb 9.3 oz)     Vital Signs with Ranges  Temp:  [98.1  F (36.7  C)-102.9  F (39.4  C)] 98.1  F (36.7  C)  Pulse:  [] 92  Resp:  [16-19] 16  BP: (115-128)/(64-76) 121/74  SpO2:  [98 %-100 %] 99 %  I/O last 3 completed shifts:  In: 1400 [P.O.:1400]  Out: 1350 [Urine:1350]    Gen: Well nourished, well developed, alert and oriented x 3, no acute distressed  HEENT: Atraumatic, normocephalic; sclera non-injected, anicterric;  oral mucosa moist, no lesion, no exudate  Lungs: Clear to ausculation, no wheezes, no rhonchi, no rales  Heart: Regular rate, regular rhythm, no gallops, no rubs, no murmurs  GI: Bowel sound normal, no hepatosplenomegaly, no masses, non-tender, non-distended, no guarding, no rebound tenderness  Lymph: No lymphadenopathy, no edema  Skin: No rashes, no chronic venous stasis, R knee in immobilizer, bandage over left sternoclavicular joint CDI  MS: No pain on palpation of cervical, thoracic, lumbar, or sacral spine    Medications       aspirin  81 mg Oral BID     cefTRIAXone  2 g Intravenous Q24H     polyethylene glycol  17 g Oral Daily     senna-docusate  1 tablet Oral BID     sodium chloride (PF)  3 mL Intracatheter Q8H       Data   Recent Labs   Lab 10/02/22  0516 10/01/22  0614 09/29/22  0616 09/28/22  0454 09/27/22  0454 09/26/22  0419   WBC 12.3* 12.2*  --  8.2 8.2 11.9*   HGB 9.9* 9.9*  --  10.6* 10.4* 11.8*   MCV 87 86  --  87 87 85   * 597*  --  412 280 226   NA  --   --   --  141 141 135*   POTASSIUM  --   --   --  4.5 4.5 4.0   CHLORIDE  --   --   --  103 105 99   CO2  --   --   --  28 27 26   BUN  --   --   --  5.2* 7.3 9.6   CR  --   --  0.82 0.81 0.87 0.81   ANIONGAP  --   --   --  10 9 10   JOCELYNE  --   --   --  8.8 8.2* 8.7   GLC  --   --   --  113* 115* 126*   ALBUMIN  --   --   --  3.0* 2.7*  --    PROTTOTAL  --   --   --  6.8 6.2*  --    BILITOTAL  --   --   --  0.7 1.2  --    ALKPHOS  --   --   --  132* 102  --    ALT  --   --   --  86* 92*  --    AST  --   --   --  60* 100*  --        Recent Results (from the past 24 hour(s))   CT Soft tissue neck w contrast*    Narrative    EXAM: CT SOFT TISSUE NECK W CONTRAST  LOCATION: [facility]  DATE/TIME: 10/1/2022 4:25 PM    INDICATION: Recurrent fevers. Polyarticular infectious/inflammatory process, eval for additional abscess or infection  COMPARISON: None.  CONTRAST: 50 mL Isovue 370  TECHNIQUE: Routine CT Soft Tissue Neck  with IV contrast. Multiplanar reformats. Dose reduction techniques were used.    FINDINGS:     MUCOSAL SPACES/SOFT TISSUES: Normal mucosal spaces of the upper aerodigestive tract. No mucosal mass or inflammation identified. Normal vocal cords and infraglottic trachea. Normal parapharyngeal space and  spaces. Normal oral cavity,    spaces, and floor of mouth structures.    LYMPH NODES: Shotty bilateral cervical lymph nodes, none frankly pathologic by size or morphology criteria.     SALIVARY GLANDS: Normal parotid and submandibular glands.    THYROID: Normal.     VESSELS: Vascular structures of the neck are patent.    VISUALIZED INTRACRANIAL/ORBITS/SINUSES: No abnormality of the visualized intracranial compartment or orbits. Visualized paranasal sinuses and mastoid air cells are clear.    OTHER: Left sternoclavicular joint effusion without aggressive/erosive osseous changes. Overlying cutaneous fat stranding and small locules of subcutaneous gas consistent with surgical drainage. Otherwise unremarkable visualized osseous structures. The   included lung apices are clear.      Impression    IMPRESSION:   1.  Persistent left sternoclavicular joint effusion as seen on dedicated CT from 09/27/2022. Changes of interval surgical drainage without evidence for aggressive osseous erosion/destruction.  2.  No additional evidence for an infectious/inflammatory process in the neck.  3.  Nonspecific shotty cervical lymph nodes are presumably reactive in nature.   CT Chest/Abdomen/Pelvis w Contrast    Narrative    EXAM: CT CHEST/ABDOMEN/PELVIS W CONTRAST  LOCATION: [facility]  DATE/TIME: 10/1/2022 4:35 PM    INDICATION: Fever, eval for abscess or infection.  COMPARISON: None.  TECHNIQUE: CT scan of the chest, abdomen, and pelvis was performed following injection of IV contrast. Multiplanar reformats were obtained. Dose reduction techniques were used.   CONTRAST: 50 mL Isovue  370.    FINDINGS:   LUNGS AND PLEURA: Normal.    MEDIASTINUM/AXILLAE: Normal.    CORONARY ARTERY CALCIFICATION: None.    HEPATOBILIARY: Normal.    PANCREAS: Normal.    SPLEEN: Normal.    ADRENAL GLANDS: Normal.    KIDNEYS/BLADDER: Normal.    BOWEL: Normal.    LYMPH NODES: Normal.    VASCULATURE: Unremarkable.    PELVIC ORGANS: Normal.    MUSCULOSKELETAL: Soft tissue swelling overlying the left sternoclavicular joint with a bubble of air present superficially. This could be due to infection. There is no evidence for osteomyelitis.      Impression    IMPRESSION:  1.  Soft tissue swelling left sternoclavicular joint with some soft tissue air present suspicious for infection. No CT evidence for osteomyelitis.   CT Head w contrast    Narrative    EXAM: CT HEAD W CONTRAST  LOCATION: [facility]  DATE/TIME: 10/1/2022 4:44 PM    INDICATION: Recurrent fevers. Concern for polyarticular septic arthritis. Eval for abscess or infection  COMPARISON: None.  CONTRAST: 25 mL Isovue 370  TECHNIQUE: Routine CT Head with IV contrast.  Dose reduction techniques were used.    FINDINGS:  INTRACRANIAL CONTENTS: No intracranial hemorrhage, extraaxial collection, or mass effect.  No CT evidence of acute infarct. Normal parenchymal attenuation. Normal ventricles and sulci. No pathologic enhancement identified.    VISUALIZED ORBITS/SINUSES/MASTOIDS: No intraorbital abnormality. No paranasal sinus mucosal disease. No middle ear or mastoid effusion.    BONES/SOFT TISSUES: No acute abnormality.      Impression    IMPRESSION:  1.  No evidence for acute intracranial process.   US Lower Extremity Venous Duplex Bilateral    Narrative    EXAM: US LOWER EXTREMITY VENOUS DUPLEX BILATERAL  LOCATION: [facility]  DATE/TIME: 10/1/2022 5:19 PM    INDICATION: Pain and swelling.  COMPARISON: None.  TECHNIQUE: Venous Duplex ultrasound of bilateral lower extremities with and without compression, augmentation  and duplex. Color flow and spectral Doppler with waveform analysis performed.    FINDINGS: Exam includes the common femoral, femoral, popliteal veins as well as segmentally visualized deep calf veins and greater saphenous vein.     RIGHT: No deep vein thrombosis. No superficial thrombophlebitis. No popliteal cyst.    LEFT: No deep vein thrombosis. No superficial thrombophlebitis. No popliteal cyst.      Impression    IMPRESSION:  1.  No deep venous thrombosis in the bilateral lower extremities.

## 2022-10-03 ENCOUNTER — HEALTH MAINTENANCE LETTER (OUTPATIENT)
Age: 32
End: 2022-10-03

## 2022-10-03 LAB — BACTERIA BLD CULT: NO GROWTH

## 2022-10-03 PROCEDURE — 250N000013 HC RX MED GY IP 250 OP 250 PS 637: Performed by: INTERNAL MEDICINE

## 2022-10-03 PROCEDURE — 250N000013 HC RX MED GY IP 250 OP 250 PS 637: Performed by: ORTHOPAEDIC SURGERY

## 2022-10-03 PROCEDURE — 99231 SBSQ HOSP IP/OBS SF/LOW 25: CPT | Performed by: INTERNAL MEDICINE

## 2022-10-03 PROCEDURE — 120N000001 HC R&B MED SURG/OB

## 2022-10-03 PROCEDURE — 250N000011 HC RX IP 250 OP 636: Performed by: INTERNAL MEDICINE

## 2022-10-03 RX ORDER — LIDOCAINE 40 MG/G
CREAM TOPICAL
Status: DISCONTINUED | OUTPATIENT
Start: 2022-10-03 | End: 2022-10-05 | Stop reason: HOSPADM

## 2022-10-03 RX ADMIN — ASPIRIN 81 MG: 81 TABLET, COATED ORAL at 09:14

## 2022-10-03 RX ADMIN — OXYCODONE HYDROCHLORIDE 10 MG: 5 TABLET ORAL at 09:44

## 2022-10-03 RX ADMIN — OXYCODONE HYDROCHLORIDE 5 MG: 5 TABLET ORAL at 19:29

## 2022-10-03 RX ADMIN — ACETAMINOPHEN 650 MG: 325 TABLET, FILM COATED ORAL at 13:36

## 2022-10-03 RX ADMIN — CEFTRIAXONE SODIUM 2 G: 2 INJECTION, POWDER, FOR SOLUTION INTRAMUSCULAR; INTRAVENOUS at 13:37

## 2022-10-03 RX ADMIN — OXYCODONE HYDROCHLORIDE 5 MG: 5 TABLET ORAL at 05:50

## 2022-10-03 RX ADMIN — SENNOSIDES AND DOCUSATE SODIUM 1 TABLET: 50; 8.6 TABLET ORAL at 19:27

## 2022-10-03 RX ADMIN — IBUPROFEN 400 MG: 400 TABLET ORAL at 09:14

## 2022-10-03 RX ADMIN — ACETAMINOPHEN 650 MG: 325 TABLET, FILM COATED ORAL at 19:29

## 2022-10-03 RX ADMIN — POLYETHYLENE GLYCOL 3350 17 G: 17 POWDER, FOR SOLUTION ORAL at 09:15

## 2022-10-03 RX ADMIN — SENNOSIDES AND DOCUSATE SODIUM 1 TABLET: 50; 8.6 TABLET ORAL at 09:15

## 2022-10-03 RX ADMIN — OXYCODONE HYDROCHLORIDE 10 MG: 5 TABLET ORAL at 13:36

## 2022-10-03 RX ADMIN — ASPIRIN 81 MG: 81 TABLET, COATED ORAL at 19:28

## 2022-10-03 ASSESSMENT — ACTIVITIES OF DAILY LIVING (ADL)
ADLS_ACUITY_SCORE: 41
ADLS_ACUITY_SCORE: 42
ADLS_ACUITY_SCORE: 41
ADLS_ACUITY_SCORE: 41
ADLS_ACUITY_SCORE: 42

## 2022-10-03 NOTE — PROGRESS NOTES
Clinical Nutrition Services- Brief Note    Reviewed nutrition risk factors due to LOS. Pt is tolerating a Regular diet, eating well per nursing documentation and patient report. No nutrition issues identified at this time. RD will continue to follow per nutrition protocol.    Omayra Carpio RDN, YOSI  Clinical Dietitian  Office: [phone]  Weekend pager: [phone]

## 2022-10-03 NOTE — PROGRESS NOTES
[facility] Orthopaedics Progress Note      Post-operative Day: 1 Day Post-Op    Procedure(s):  IRRIGATION AND DEBRIDEMENT, KNEE, ARTHROSCOPIC RIGHT  Left Sternoclavicular Joint Irrigation And Debridement  Subjective:    Pt reports improved pain in the left shoulder and right knee, he was able to walk in the mai a bit last night. He hasn't yet gotten up this morning. He denies any new issues.     Neuro:  Patient denies new onset numbness or paresthesias      Objective:  Blood pressure 127/79, pulse 96, temperature 98.4  F (36.9  C), temperature source Oral, resp. rate 16, height 1.829 m (6'), weight 86.9 kg (191 lb 9.3 oz), SpO2 99 %.    Patient Vitals for the past 24 hrs:   BP Temp Temp src Pulse Resp SpO2   10/03/22 0656 127/79 98.4  F (36.9  C) Oral 96 16 99 %   10/03/22 0546 -- 98.4  F (36.9  C) Oral -- -- --   10/02/22 2226 (!) 141/82 100.1  F (37.8  C) Oral 98 17 98 %   10/02/22 1906 -- 99.9  F (37.7  C) Oral -- -- --   10/02/22 1836 (!) 136/90 (!) 101.2  F (38.4  C) Oral 110 16 98 %   10/02/22 1800 133/86 -- -- 116 -- --   10/02/22 1730 139/83 -- -- 117 -- --   10/02/22 1700 (!) 153/93 -- -- 116 -- --   10/02/22 1630 138/85 -- -- 107 -- --   10/02/22 1602 (!) 149/88 -- -- 104 -- --   10/02/22 1540 (!) 153/85 -- -- 113 -- --   10/02/22 1522 -- -- -- -- -- 99 %   10/02/22 1515 (!) 146/99 98.7  F (37.1  C) Oral 105 13 99 %   10/02/22 1500 (!) 156/97 -- -- 109 14 100 %   10/02/22 1445 (!) 143/99 -- -- 107 17 100 %   10/02/22 1430 (!) 149/106 -- -- 113 26 98 %   10/02/22 1421 (!) 150/113 98.1  F (36.7  C) -- 120 11 99 %   10/02/22 1420 (!) 151/100 -- -- (!) 121 -- --       Wt Readings from Last 4 Encounters:   09/26/22 86.9 kg (191 lb 9.3 oz)       Gen: A&O x 3. NAD. Appears comfortable.  Wound status: Right knee with post operative dressings intact, no drainage. Left anterior chest wall dressing is c/d/i.  Circulation, motion and sensation: Dorsiflexion/plantarflexion intact and equal bilaterally; distal upper  and lower extremity sensation is intact and equal bilaterally. Foot and toes, fingers are warm and well perfused.    Swelling: Mild  Calf tenderness: calves are soft and non-tender bilaterally     Pertinent Labs   Lab Results: personally reviewed.     Recent Labs   Lab Test 10/02/22  0516 10/01/22  0614 09/30/22  0601 09/28/22  0454 09/27/22  0454 09/26/22  0419   HGB 9.9* 9.9*  --  10.6* 10.4* 11.8*   HCT 29.6* 29.5*  --  32.2* 31.3* 34.8*   MCV 87 86  --  87 87 85   * 597*  --  412 280 226   NA  --   --   --  141 141 135*   .99* 222.72* 213.18* 216.87* 269.53*  --        Plan:   Continue current cares and rehabilitation. Encouraged knee ROM, left shoulder ROM as tolerated. Sling for support as desired and knee immobilizer for ambulation.   Anticoagulation protocol: ASA 81mg BID  x 30  days  Pain medications: oxycodone, tylenol and ibuprofen  Weight bearing status:  WBAT right leg, NWB LUE  Ongoing IV abx planning per primary and ID; CT scans completed on 10/2 without additional evidence of infection.   No plan for further surgical intervention at this time.   Disposition: Discharge per medicine. Per , consider discharge with IV abx and close follow up.            Report completed by:  Leonardo Mccormick PA-C  Date: 10/3/2022  Time: 9:24 AM

## 2022-10-03 NOTE — PLAN OF CARE
Goal Outcome Evaluation:    Plan of Care Reviewed With: patient, significant other     Pt A&Ox4, able to make needs known, utilizing call light appropriately. Pt medicated x1 with PRN dilaudid for pain, Atarax x1 post surgical procedure, see MAR. Pt has been vitally stable, though spiked a fever of 101.2 @ approx 1836. Tylenol administered, PA paged. Order for NS fluid bolus administered, see MAR. Pts temp @ 1906; 99.9. Pt has not yet ambulated due to pain, drowsiness, and shaking post procedure. Ice packs to left knee. Dressing on left upper chest CDI.        Temp: 99.9  F (37.7  C) Temp src: Oral BP: (!) 136/90 Pulse: 110   Resp: 16 SpO2: 98 % O2 Device: None (Room air) Oxygen Delivery: 1/32 LPM

## 2022-10-03 NOTE — PROGRESS NOTES
Care Management Follow Up    Length of Stay (days): 7    Expected Discharge Date: 10/04/2022     Concerns to be Addressed: final IV infusion plan at discharge    Patient plan of care discussed at interdisciplinary rounds: Yes    Anticipated Discharge Disposition: Home with Private Pay Home Infusion vs Outpatient Infusion Services     Anticipated Discharge Services: Infusion services  Anticipated Discharge DME: None    Patient/family educated on Medicare website which has current facility and service quality ratings: yes  Education Provided on the Discharge Plan: yes   Patient/Family in Agreement with the Plan: yes    Referrals Placed by CM/SW: Home Infusion  Private pay costs discussed: insurance costs co-pays    Additional Information:  Update received during IDT rounds. Informed Pt is not medically stable for discharge.     Pt Rosey Vines -Pt insurance has become effective and is now listed on demographic facesheet.     CM spoke with Mya Calderon--RN [facility] Liaison to request benefit check on Pt's new insurance.    Informed Pt does NOT have Home Infusion benefits.     CM looking into possible Outpatient Infusion Center coverage. Per [facility] is appears pt does NOT have coverage for the Infusion Center either but will confirm directly with them. Message sent to Giuseppe Cooley- Outpatient Infusion Center . --awaiting benefit check     PLAN: CM to follow. At this time-Pt may need to discharge on Private Pay Home Infusion services. Awaiting final IV Abx plans from provider.        YVONNE Rico

## 2022-10-03 NOTE — PROGRESS NOTES
Patient is alert and oriented x 4.  Patient is calm and cooperative.  Patient reports having pain in the affected knee.  Denies pain in the shoulder.  Patient received PRN pain medication per the MAR.  Patient ambulated in the hallway with knee immobilizer in place.  Patient wore the SCDs for a short time before requesting that they come off.  Patient again had a fever, ibuprofen was administered per the MAR.      Temp: 98.4  F (36.9  C) Temp src: Oral BP: (!) 141/82 Pulse: 98   Resp: 17 SpO2: 98 % Height: 182.9 cm (6') Weight: 86.9 kg (191 lb 9.3 oz)  O2 Device: None (Room air) at Oxygen Delivery: 1/32 LPM

## 2022-10-03 NOTE — PROGRESS NOTES
[facility]    Hospitalist Progress Note    Date of Service (when I saw the patient): 10/03/2022    Assessment & Plan   Sanket Guerrero is a 32 year old male who was admitted on 9/25/2022 with several days of pain and swelling of the right knee and also pain at the left sternoclavicular joint.    Septic left sternoclavicular joint due to Strep agalactiae   Septic right knee due to Strep agalactiae  S/p right knee washout x 3 (9/26, 9/28, 10/2)  S/p left sternoclavicular joint washout x 2 (9/28, 10/2)     Left sternoclavicular joint, left shoulder and right knee inflammatory process. Right knee aspirate 9/25/2022 cell count 353k WBC though only 24% neutrophils and no crystals. Concern for septic knee with that WBC count. CRP also very high 310. S/p right knee washout and left sternoclavicular joint aspiration on 9/26/2022. Somewhat surprisingly, cultures from both joints positive for Group B Strep.     ID:     Blood Cx 9/25/2022 NGTD    Right knee aspirate 9/25/2022 positive for Strep agalactiae    Blood Cx 9/26/2022 NGTD    Left sternoclavicular joint aspirate 9/26/2022 positive Strep agalactiae    Right knee synovial fluid 9/26/2022 NGTD    Chlamydia and N gonorrhea PCR on urine negative 9/26/2022    MRSA/MSSA swab negative 9/27/2022    Blood Cx 9/27/2022 NGTD    Blood Cx 9/28/2022 NGTD    Blood Cx 9/29/2022 NGTD    Blood Cx 9/30/2022 NGTD    Blood Cx 10/01/2022 NGTD    Right knee synovial fluid Cx 10/2/2022 NGTD    Antibiotics:    Started on empiric treatment with cefepime and vancomycin on 9/26 - 9/27/2022    Change to ceftriaxone 2 g q 24 hrs on 9/27/2022 - present     Imaging:    TTE negative for endocarditis 9/28/2022    Bilateral lower extremity venous Doppler US neg for DVT 10/1/2022    CT head, neck, and chest/abd/pelvis 10/1/2022 notable only for persistent sternoclavicular joint effusion      Source of the Group B Strep is not clear. TTE negative and valves are normal. Multiple blood  cultures are negative. Pan-scan shows no obvious source or other focus of infection. Patient is otherwise very healthy appearing.     Only mild leucocytosis 12.5 on presentation which improved.      ?  270 ?  217 ?  213 ?  223 ?  218 (10/2)    Recurrent, intermittent fevers since admission, Tm 102.9 (10/1). Fever again last .2. Afebrile today 10/3 through the afternoon.    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  Patient felt these were often associated with episodes of beer drinking.  However he drinks beer frequently so difficult to say at that the really the etiology.  Past episodes have been without much pain or discomfort.  He always felt it was gout though never actually saw physician.  The effusions would go away spontaneously over a few days.  This episode was unusual at that there was a lot of pain in the knee and the effusion was much larger.  He also has swelling also in the left sternoclavicular joint which is new.   He and his longtime girlfriend were tested for STDs about 5 years ago and were negative and he had had joint effusions before and after that and GC/Chlamydia PCR negative. Lyme antibodies negative.     CRP initially improved 30% on antibiotics but then stable 220 last few days.    - Consider antiinflammatory treatment if not resolving   - Consider referral to rheumatology once infections resolved     Diet: Advance Diet as Tolerated: Regular Diet Adult    DVT Prophylaxis: Low Risk/Ambulatory with no VTE prophylaxis indicated  Trujillo Catheter: Not present  Central Lines: None  Code Status: Full Code          Discussion: Stable but had not been afebrile 24 hours.     Disposition Plan   Hope for  PICC and discharge with good outpatient plan tomorrow if continues afebrile.     Attestation:  Total time: 40 minutes    Alvaro Braxton MD  [facility] Medicine        Interval History   Feels good. Improved ROM of left upper extremity reach across body, improved right knee pain from admission.     Physical Exam   Temp:  [98.4  F (36.9  C)-101.2  F (38.4  C)] 99.1  F (37.3  C)  Pulse:  [] 92  Resp:  [16-17] 16  BP: (118-141)/(76-90) 126/77  SpO2:  [95 %-99 %] 98 %    Weights:   Vitals:    09/25/22 1521 09/26/22 0303   Weight: 85.7 kg (189 lb) 86.9 kg (191 lb 9.3 oz)    Body mass index is 25.98 kg/m .    Constitutional: alert and oriented, nontoxic, NAD  CV: Regular, no murmur  Respiratory: CTA bilaterally  GI: Soft, non-tender, bowel sounds normal  Skin: Warm and dry. No splinter hemorrhages fingernails or toenails    Data   Recent Labs   Lab 10/02/22  0516 10/01/22  0614 09/29/22  0616 09/28/22  0454 09/27/22  0454   WBC 12.3* 12.2*  --  8.2 8.2   HGB 9.9* 9.9*  --  10.6* 10.4*   MCV 87 86  --  87 87   * 597*  --  412 280   NA  --   --   --  141 141   POTASSIUM  --   --   --  4.5 4.5   CHLORIDE  --   --   --  103 105   CO2  --   --   --  28 27   BUN  --   --   --  5.2* 7.3   CR  --   --  0.82 0.81 0.87   ANIONGAP  --   --   --  10 9   JOCELYNE  --   --   --  8.8 8.2*   GLC  --   --   --  113* 115*   ALBUMIN  --   --   --  3.0* 2.7*   PROTTOTAL  --   --   --  6.8 6.2*   BILITOTAL  --   --   --  0.7 1.2   ALKPHOS  --   --   --  132* 102   ALT  --   --   --  86* 92*   AST  --   --   --  60* 100*       Recent Labs   Lab 09/28/22  0454 09/27/22  0454   * 115*        Unresulted Labs Ordered in the Past 30 Days of this Admission     Date and Time Order Name Status Description    10/2/2022  1:28 PM Synovial fluid Aerobic Bacterial Culture Routine Preliminary     10/2/2022  1:28 PM Anaerobic Bacterial Culture Routine In process     10/1/2022  3:04 PM Blood Culture Hand, Right Preliminary     9/30/2022  9:21 AM  Blood Culture Arm, Right Preliminary     9/30/2022  9:21 AM Blood Culture Arm, Left Preliminary     9/29/2022 12:02 AM Blood Culture Arm, Right Preliminary     9/27/2022 12:52 PM Lyme disease DNA detection by PCR In process     9/26/2022  3:05 PM Anaerobic Bacterial Culture Routine Preliminary     9/26/2022  2:48 PM Anaerobic Bacterial Culture Routine Preliminary            Imaging: No results found for this or any previous visit (from the past 24 hour(s)).     I reviewed all new labs and imaging results over the last 24 hours. I personally reviewed no images or EKG's today.    Medications       aspirin  81 mg Oral BID     cefTRIAXone  2 g Intravenous Q24H     polyethylene glycol  17 g Oral Daily     senna-docusate  1 tablet Oral BID     sodium chloride (PF)  3 mL Intracatheter Q8H   Reviewed jarred Braxton MD  [facility]

## 2022-10-03 NOTE — PLAN OF CARE
Patient's pain is controlled with advil, oxycodone and ice this am. Needs encouragement to get up and walk around. Is independently moving. Vitals are stable. Eddie ESCALERA saw patient. She said to keep dressings in place.

## 2022-10-04 ENCOUNTER — APPOINTMENT (OUTPATIENT)
Dept: ULTRASOUND IMAGING | Facility: CLINIC | Age: 32
DRG: 488 | End: 2022-10-04
Attending: INTERNAL MEDICINE
Payer: COMMERCIAL

## 2022-10-04 ENCOUNTER — APPOINTMENT (OUTPATIENT)
Dept: GENERAL RADIOLOGY | Facility: CLINIC | Age: 32
DRG: 488 | End: 2022-10-04
Attending: INTERNAL MEDICINE
Payer: COMMERCIAL

## 2022-10-04 LAB
ALBUMIN SERPL BCG-MCNC: 3.1 G/DL (ref 3.5–5.2)
ALBUMIN SERPL BCG-MCNC: 3.2 G/DL (ref 3.5–5.2)
ALBUMIN UR-MCNC: NEGATIVE MG/DL
ALP SERPL-CCNC: 259 U/L (ref 40–129)
ALP SERPL-CCNC: 263 U/L (ref 40–129)
ALT SERPL W P-5'-P-CCNC: 284 U/L (ref 10–50)
ALT SERPL W P-5'-P-CCNC: 322 U/L (ref 10–50)
ANION GAP SERPL CALCULATED.3IONS-SCNC: 13 MMOL/L (ref 7–15)
APPEARANCE UR: CLEAR
AST SERPL W P-5'-P-CCNC: 146 U/L (ref 10–50)
AST SERPL W P-5'-P-CCNC: 214 U/L (ref 10–50)
B BURGDOR DNA SPEC QL NAA+PROBE: NOT DETECTED
BACTERIA BLD CULT: NO GROWTH
BASOPHILS # BLD AUTO: 0.1 10E3/UL (ref 0–0.2)
BASOPHILS NFR BLD AUTO: 1 %
BILIRUB DIRECT SERPL-MCNC: <0.2 MG/DL (ref 0–0.3)
BILIRUB SERPL-MCNC: 0.4 MG/DL
BILIRUB SERPL-MCNC: 0.4 MG/DL
BILIRUB UR QL STRIP: NEGATIVE
BUN SERPL-MCNC: 11.6 MG/DL (ref 6–20)
CALCIUM SERPL-MCNC: 9.1 MG/DL (ref 8.6–10)
CHLORIDE SERPL-SCNC: 98 MMOL/L (ref 98–107)
CK SERPL-CCNC: 50 U/L (ref 39–308)
COLOR UR AUTO: YELLOW
CREAT SERPL-MCNC: 0.72 MG/DL (ref 0.67–1.17)
CRP SERPL-MCNC: 206.4 MG/L
DEPRECATED HCO3 PLAS-SCNC: 25 MMOL/L (ref 22–29)
EOSINOPHIL # BLD AUTO: 0.1 10E3/UL (ref 0–0.7)
EOSINOPHIL NFR BLD AUTO: 1 %
ERYTHROCYTE [DISTWIDTH] IN BLOOD BY AUTOMATED COUNT: 12.9 % (ref 10–15)
FERRITIN SERPL-MCNC: 1328 NG/ML (ref 31–409)
GFR SERPL CREATININE-BSD FRML MDRD: >90 ML/MIN/1.73M2
GLUCOSE SERPL-MCNC: 137 MG/DL (ref 70–99)
GLUCOSE UR STRIP-MCNC: NEGATIVE MG/DL
HCT VFR BLD AUTO: 27.4 % (ref 40–53)
HGB BLD-MCNC: 9.2 G/DL (ref 13.3–17.7)
HGB UR QL STRIP: NEGATIVE
IMM GRANULOCYTES # BLD: 0.1 10E3/UL
IMM GRANULOCYTES NFR BLD: 1 %
INR PPP: 1.14 (ref 0.85–1.15)
IRON BINDING CAPACITY (ROCHE): 184 UG/DL (ref 240–430)
IRON SATN MFR SERPL: 7 % (ref 15–46)
IRON SERPL-MCNC: 12 UG/DL (ref 61–157)
KETONES UR STRIP-MCNC: NEGATIVE MG/DL
LEUKOCYTE ESTERASE UR QL STRIP: NEGATIVE
LYMPHOCYTES # BLD AUTO: 1.5 10E3/UL (ref 0.8–5.3)
LYMPHOCYTES NFR BLD AUTO: 14 %
MCH RBC QN AUTO: 29.1 PG (ref 26.5–33)
MCHC RBC AUTO-ENTMCNC: 33.6 G/DL (ref 31.5–36.5)
MCV RBC AUTO: 87 FL (ref 78–100)
MONOCYTES # BLD AUTO: 1.2 10E3/UL (ref 0–1.3)
MONOCYTES NFR BLD AUTO: 12 %
NEUTROPHILS # BLD AUTO: 7.6 10E3/UL (ref 1.6–8.3)
NEUTROPHILS NFR BLD AUTO: 71 %
NITRATE UR QL: NEGATIVE
NRBC # BLD AUTO: 0 10E3/UL
NRBC BLD AUTO-RTO: 0 /100
PH UR STRIP: 7 [PH] (ref 5–7)
PLATELET # BLD AUTO: 740 10E3/UL (ref 150–450)
POTASSIUM SERPL-SCNC: 4.3 MMOL/L (ref 3.4–5.3)
PROT SERPL-MCNC: 7.9 G/DL (ref 6.4–8.3)
PROT SERPL-MCNC: 7.9 G/DL (ref 6.4–8.3)
RBC # BLD AUTO: 3.16 10E6/UL (ref 4.4–5.9)
SODIUM SERPL-SCNC: 136 MMOL/L (ref 136–145)
SP GR UR STRIP: 1.01 (ref 1–1.03)
TOTAL PROTEIN SERUM FOR ELP: 7.4 G/DL (ref 6.4–8.3)
UROBILINOGEN UR STRIP-MCNC: NORMAL MG/DL
WBC # BLD AUTO: 10.5 10E3/UL (ref 4–11)

## 2022-10-04 PROCEDURE — 99232 SBSQ HOSP IP/OBS MODERATE 35: CPT | Performed by: INTERNAL MEDICINE

## 2022-10-04 PROCEDURE — 86709 HEPATITIS A IGM ANTIBODY: CPT | Performed by: INTERNAL MEDICINE

## 2022-10-04 PROCEDURE — 82248 BILIRUBIN DIRECT: CPT | Performed by: INTERNAL MEDICINE

## 2022-10-04 PROCEDURE — 86803 HEPATITIS C AB TEST: CPT | Performed by: INTERNAL MEDICINE

## 2022-10-04 PROCEDURE — 250N000011 HC RX IP 250 OP 636: Performed by: INTERNAL MEDICINE

## 2022-10-04 PROCEDURE — 36592 COLLECT BLOOD FROM PICC: CPT | Performed by: INTERNAL MEDICINE

## 2022-10-04 PROCEDURE — 272N000579 HC TRAY POWER PICC SOLO 4FR SINGLE LUMEN

## 2022-10-04 PROCEDURE — 76705 ECHO EXAM OF ABDOMEN: CPT

## 2022-10-04 PROCEDURE — 999N000065 XR CHEST PORT 1 VIEW

## 2022-10-04 PROCEDURE — 86706 HEP B SURFACE ANTIBODY: CPT | Performed by: INTERNAL MEDICINE

## 2022-10-04 PROCEDURE — 86140 C-REACTIVE PROTEIN: CPT | Performed by: INTERNAL MEDICINE

## 2022-10-04 PROCEDURE — 87340 HEPATITIS B SURFACE AG IA: CPT | Performed by: INTERNAL MEDICINE

## 2022-10-04 PROCEDURE — 250N000009 HC RX 250: Performed by: INTERNAL MEDICINE

## 2022-10-04 PROCEDURE — 250N000013 HC RX MED GY IP 250 OP 250 PS 637: Performed by: INTERNAL MEDICINE

## 2022-10-04 PROCEDURE — 84165 PROTEIN E-PHORESIS SERUM: CPT | Mod: TC | Performed by: STUDENT IN AN ORGANIZED HEALTH CARE EDUCATION/TRAINING PROGRAM

## 2022-10-04 PROCEDURE — 82550 ASSAY OF CK (CPK): CPT | Performed by: INTERNAL MEDICINE

## 2022-10-04 PROCEDURE — 120N000001 HC R&B MED SURG/OB

## 2022-10-04 PROCEDURE — 84165 PROTEIN E-PHORESIS SERUM: CPT | Mod: 26

## 2022-10-04 PROCEDURE — 83550 IRON BINDING TEST: CPT | Performed by: INTERNAL MEDICINE

## 2022-10-04 PROCEDURE — 85610 PROTHROMBIN TIME: CPT | Performed by: INTERNAL MEDICINE

## 2022-10-04 PROCEDURE — 85025 COMPLETE CBC W/AUTO DIFF WBC: CPT | Performed by: INTERNAL MEDICINE

## 2022-10-04 PROCEDURE — 36569 INSJ PICC 5 YR+ W/O IMAGING: CPT

## 2022-10-04 PROCEDURE — 250N000013 HC RX MED GY IP 250 OP 250 PS 637: Performed by: ORTHOPAEDIC SURGERY

## 2022-10-04 PROCEDURE — 36415 COLL VENOUS BLD VENIPUNCTURE: CPT | Performed by: INTERNAL MEDICINE

## 2022-10-04 PROCEDURE — 84155 ASSAY OF PROTEIN SERUM: CPT | Performed by: INTERNAL MEDICINE

## 2022-10-04 PROCEDURE — 81003 URINALYSIS AUTO W/O SCOPE: CPT | Performed by: INTERNAL MEDICINE

## 2022-10-04 PROCEDURE — 82728 ASSAY OF FERRITIN: CPT | Performed by: INTERNAL MEDICINE

## 2022-10-04 PROCEDURE — 250N000013 HC RX MED GY IP 250 OP 250 PS 637: Performed by: NURSE ANESTHETIST, CERTIFIED REGISTERED

## 2022-10-04 RX ADMIN — CEFTRIAXONE SODIUM 2 G: 2 INJECTION, POWDER, FOR SOLUTION INTRAMUSCULAR; INTRAVENOUS at 18:27

## 2022-10-04 RX ADMIN — LIDOCAINE HYDROCHLORIDE ANHYDROUS 2 ML: 10 INJECTION, SOLUTION INFILTRATION at 13:15

## 2022-10-04 RX ADMIN — SENNOSIDES AND DOCUSATE SODIUM 1 TABLET: 50; 8.6 TABLET ORAL at 21:57

## 2022-10-04 RX ADMIN — ACETAMINOPHEN 650 MG: 325 TABLET, FILM COATED ORAL at 03:16

## 2022-10-04 RX ADMIN — OXYCODONE HYDROCHLORIDE 10 MG: 5 TABLET ORAL at 03:17

## 2022-10-04 RX ADMIN — OXYCODONE HYDROCHLORIDE 5 MG: 5 TABLET ORAL at 09:00

## 2022-10-04 RX ADMIN — OXYCODONE HYDROCHLORIDE 5 MG: 5 TABLET ORAL at 14:34

## 2022-10-04 RX ADMIN — HYDROXYZINE HYDROCHLORIDE 25 MG: 25 TABLET, FILM COATED ORAL at 22:12

## 2022-10-04 RX ADMIN — ACETAMINOPHEN 650 MG: 325 TABLET, FILM COATED ORAL at 18:12

## 2022-10-04 RX ADMIN — OXYCODONE HYDROCHLORIDE 5 MG: 5 TABLET ORAL at 10:37

## 2022-10-04 RX ADMIN — ASPIRIN 81 MG: 81 TABLET, COATED ORAL at 21:57

## 2022-10-04 RX ADMIN — SENNOSIDES AND DOCUSATE SODIUM 1 TABLET: 50; 8.6 TABLET ORAL at 08:56

## 2022-10-04 RX ADMIN — BACITRACIN: 500 OINTMENT TOPICAL at 22:13

## 2022-10-04 RX ADMIN — OXYCODONE HYDROCHLORIDE 10 MG: 5 TABLET ORAL at 22:12

## 2022-10-04 RX ADMIN — OXYCODONE HYDROCHLORIDE 5 MG: 5 TABLET ORAL at 18:12

## 2022-10-04 RX ADMIN — ASPIRIN 81 MG: 81 TABLET, COATED ORAL at 08:56

## 2022-10-04 RX ADMIN — POLYETHYLENE GLYCOL 3350 17 G: 17 POWDER, FOR SOLUTION ORAL at 08:56

## 2022-10-04 ASSESSMENT — ACTIVITIES OF DAILY LIVING (ADL)
ADLS_ACUITY_SCORE: 41

## 2022-10-04 NOTE — PROGRESS NOTES
[facility]    Hospitalist Progress Note    Date of Service (when I saw the patient): 10/04/2022    Assessment & Plan   Sanket Guerrero is a 32 year old male who was admitted on 9/25/2022 with several days of pain and swelling of the right knee and also pain at the left sternoclavicular joint.    Septic left sternoclavicular joint due to Strep agalactiae   Septic right knee due to Strep agalactiae  S/p right knee washout x 3 (9/26, 9/28, 10/2)  S/p left sternoclavicular joint washout x 2 (9/28, 10/2)     Left sternoclavicular joint, left shoulder and right knee inflammatory process. Right knee aspirate 9/25/2022 cell count 353k WBC though only 24% neutrophils and no crystals. Concern for septic knee with that WBC count. CRP also very high 310. S/p right knee washout and left sternoclavicular joint aspiration on 9/26/2022. Somewhat surprisingly, cultures from both joints positive for Group B Strep.     ID:     Blood Cx 9/25/2022 NGTD    Right knee aspirate 9/25/2022 positive for Strep agalactiae    Blood Cx 9/26/2022 NGTD    Left sternoclavicular joint aspirate 9/26/2022 positive Strep agalactiae    Right knee synovial fluid 9/26/2022 NGTD    Chlamydia and N gonorrhea PCR on urine negative 9/26/2022    MRSA/MSSA swab negative 9/27/2022    Blood Cx 9/27/2022 NGTD    Blood Cx 9/28/2022 NGTD    Blood Cx 9/29/2022 NGTD    Blood Cx 9/30/2022 NGTD    Blood Cx 10/01/2022 NGTD    Right knee synovial fluid Cx 10/2/2022 NGTD    Antibiotics:    Started on empiric treatment with cefepime and vancomycin on 9/26 - 9/27/2022    Change to ceftriaxone 2 g q 24 hrs on 9/27/2022 - present     Imaging:    TTE negative for endocarditis 9/28/2022    Bilateral lower extremity venous Doppler US neg for DVT 10/1/2022    CT head, neck, and chest/abd/pelvis 10/1/2022 notable only for persistent sternoclavicular joint effusion      Source of the Group B Strep is not clear. TTE negative and valves are normal. Multiple blood  cultures are negative. Pan-scan shows no obvious source or other focus of infection. Patient is otherwise very healthy appearing.     Only mild leucocytosis 12.5 on presentation which improved.      ?  270 ?  217 ?  213 ?  223 ?  218 ?  206 (10/4)    Recurrent, intermittent fevers since admission, Tm 102.9 (10/1). Fever again last .2. Afebrile today 10/3 through the afternoon.    - If afebrile through the night, plan PICC line in the morning   - Continue ceftriaxone, plan long course   - Outpatient ID follow up later this week, 10/5 or 10/7    Elevated LFTs    Progressive increase in AST, ALT and Alk Phos since admission. Ceftriaxone considered but no rash, no eosinophilia and usually after weeks on ceftriaxone therapy.  Also transaminases noted to be increasing on lab draw prior to starting ceftriaxone.  Exam is not remarkable.  Does not look cholestatic.  Discussed with infectious disease curbside consult, and we would like to see transaminases improving prior to discharge.   -Continue to follow LFTs   -We will check right upper quadrant ultrasound today    - We will check hepatitis serologies     Anemia    Suspect anemia of chronic inflammation. Hgb 12.5 on admission with normal MCV.  Hemoglobin has drifted down to 9.2 (10/4).  No evidence of bleeding.   - We will check iron studies    Thrombocytosis    Platelets steadily rising. 218 ?  ?  740. 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  Patient felt these were often associated with episodes of beer drinking.  However he drinks beer frequently so difficult to say at that the really the etiology.  Past episodes have been without much pain or discomfort.  He always felt it was gout though never actually saw physician.  The effusions would go away spontaneously over a few days.  This episode  was unusual at that there was a lot of pain in the knee and the effusion was much larger.  He also has swelling also in the left sternoclavicular joint which is new.   He and his longtime girlfriend were tested for STDs about 5 years ago and were negative and he had had joint effusions before and after that and GC/Chlamydia PCR negative. Lyme antibodies negative.    - Consider antiinflammatory treatment if not resolving   - Consider referral to rheumatology once infections resolved     Diet: Advance Diet as Tolerated: Regular Diet Adult    DVT Prophylaxis: Low Risk/Ambulatory with no VTE prophylaxis indicated  Trujillo Catheter: Not present  Central Lines: None  Code Status: Full Code        Discussion: Stable but concern for rising LFTs holding up discharge.     Disposition Plan   Tomorrow if LFTs improving    Attestation:  Total time: 55 minutes with > 50% counseling patient and gf at bedside    Alvaro Braxton MD  [facility]        Interval History   Feels well.  No chills.  Denies any abdominal pain.  Appetite is okay.  No chest pain or shortness of breath.  Does have some pain at the left sternoclavicular joint with movement and mild right knee pain but better than before. No rash.     Physical Exam   Temp:  [97.4  F (36.3  C)-99.1  F (37.3  C)] 97.8  F (36.6  C)  Pulse:  [] 99  Resp:  [16-17] 16  BP: (114-127)/(67-82) 127/78  SpO2:  [95 %-100 %] 100 %    Weights:   Vitals:    09/25/22 1521 09/26/22 0303   Weight: 85.7 kg (189 lb) 86.9 kg (191 lb 9.3 oz)    Body mass index is 25.98 kg/m .    Constitutional: alert and oriented, NAD, nontoxic  CV: Regular, no murmur  Respiratory: CTA bilaterally  GI: Soft, very mild tenderness right upper quadrant to deep palpation otherwise non-tender, bowel sounds normal  Skin: Warm and dry    Data   Recent Labs   Lab 10/04/22  0541 10/02/22  0516 10/01/22  0614 09/29/22  0616 09/28/22  0454   WBC 10.5 12.3* 12.2*  --  8.2   HGB 9.2* 9.9* 9.9*  --  10.6*   MCV 87 87 86   --  87   * 676* 597*  --  412     --   --   --  141   POTASSIUM 4.3  --   --   --  4.5   CHLORIDE 98  --   --   --  103   CO2 25  --   --   --  28   BUN 11.6  --   --   --  5.2*   CR 0.72  --   --  0.82 0.81   ANIONGAP 13  --   --   --  10   JOCELYNE 9.1  --   --   --  8.8   *  --   --   --  113*   ALBUMIN 3.2*  --   --   --  3.0*   PROTTOTAL 7.9  --   --   --  6.8   BILITOTAL 0.4  --   --   --  0.7   ALKPHOS 259*  --   --   --  132*   *  --   --   --  86*   *  --   --   --  60*       Recent Labs   Lab 10/04/22  0541 09/28/22  0454   * 113*        Unresulted Labs Ordered in the Past 30 Days of this Admission     Date and Time Order Name Status Description    10/2/2022  1:28 PM Synovial fluid Aerobic Bacterial Culture Routine Preliminary     10/2/2022  1:28 PM Anaerobic Bacterial Culture Routine Preliminary     10/1/2022  3:04 PM Blood Culture Hand, Right Preliminary     9/30/2022  9:21 AM Blood Culture Arm, Right Preliminary     9/30/2022  9:21 AM Blood Culture Arm, Left Preliminary     9/27/2022 12:52 PM Lyme disease DNA detection by PCR In process     9/26/2022  3:05 PM Anaerobic Bacterial Culture Routine Preliminary     9/26/2022  2:48 PM Anaerobic Bacterial Culture Routine Preliminary            Imaging: No results found for this or any previous visit (from the past 24 hour(s)).     I reviewed all new labs and imaging results over the last 24 hours. I personally reviewed no images or EKG's today.    Medications       aspirin  81 mg Oral BID     cefTRIAXone  2 g Intravenous Q24H     polyethylene glycol  17 g Oral Daily     senna-docusate  1 tablet Oral BID     sodium chloride (PF)  3 mL Intracatheter Q8H   Reviewed jarred Braxton MD  [facility]

## 2022-10-04 NOTE — PROGRESS NOTES
Patient alert and oriented x 4. Vitals stable; on room air. Independent in room. Pain in R knee 2-4/10, partially relieved with scheduled tylenol, ice, and PRN oxycodone. Patient ambulated in the mai early in shift. Appetite good. Voiding spontaneously. Dressings clean, dry, and intact. PIV saline locked.    Temp: 98.7  F (37.1  C) Temp src: Oral BP: 114/67 Pulse: 99   Resp: 16 SpO2: 99 % O2 Device: None (Room air)

## 2022-10-04 NOTE — PROGRESS NOTES
[facility]  Procedure Note         PICC      Sanket BONE Ly  MRN# [id number]   October 4, 2022, 1:50 PM Indication: Medication administration           Pause for the cause: Consent for catheter placement procedure signed  Time out completed  Patient ID's verified using two distinct indicators  All necessary equipment is present  Site marked if extremity to be used has been predetermined   Type of line to be used: PICC   Full barrier precautions used: Yes   Skin preparation: Chlorehexidine Gluconate 25% with isopropyl alcohol 70%   Date of insertion: October 4, 2022, 1:30 PM   Device type: Single lumen, valved, 4.0   Catheter brand: [employer]   Lot number: [id number]   Insertion location: Right basilic vein  Vein diameter 0.39 cm   Method of placement: Venipuncture  MST  Ultrasound   Number of attempts: With ultrasound: 1   Without ultrasound: 0   Difficulty threading: No   Midline IV device: Dressing dry and intact  Transparent semmipermeable dressing applied  Chlorhexidine patch  Catheter securement device   Arm circumference: Adults 10 cm above AC   Midline extremity circumference: 30 cm   Internal length: 46 cm   Midline visible catheter length: 3 cm   Total catheter length: 49 cm   Tip termination: SVC (appears lower SVC)   Method of verification: Chest x-ray   Midline patency post placement: Positive blood return  Flushes without difficulty  Saline locked   Line flush: Line flush documented on the eMAR yes   Placement verified by: Radiologist   Catheter placed by: DELMI Krishna   Discontinuation form initiated: No   Patient tolerance: Tolerated well   PICC Insertion Education Complete: Yes      Summary:  This procedure was performed without difficulty and he tolerated the procedure well with no noted immediate complications.     OK to use for meds, labs and power injection.      Recorded by DELMI Krishna    Attestation:  Amount of time performed on this procedure: 15 minutes.

## 2022-10-04 NOTE — PLAN OF CARE
Right knee and left upper chest pain relieved with oxycodone and ice to the knee. Encouraging patient to get out of bed and move around more as he has been laying in bed using the urinal the majority of the time. Did agree to get up and walk in hallway. Applies leg immobilzer independently. Requested that nurse move his leg off the bed. Encouraged to try to do that himself as plan is he is close to discharge and will need to do this at home. Was able to without problems. Plan is to have a PICC line placed.

## 2022-10-04 NOTE — PROGRESS NOTES
[facility] Orthopaedics Progress Note      Post-operative Day: 2 Days Post-Op    Procedure(s):  IRRIGATION AND DEBRIDEMENT, KNEE, ARTHROSCOPIC RIGHT  Left Sternoclavicular Joint Irrigation And Debridement  Subjective:    Pt reports mild pain in the knee and shoulder, denies any new or increased pain. The Ace wrap does feel tight on the right leg. He has been able to move the knee better and get up to ambulate.     Neuro:  Patient denies new onset numbness or paresthesias      Objective:  Blood pressure 127/78, pulse 99, temperature 97.8  F (36.6  C), temperature source Oral, resp. rate 16, height 1.829 m (6'), weight 86.9 kg (191 lb 9.3 oz), SpO2 100 %.    Patient Vitals for the past 24 hrs:   BP Temp Temp src Pulse Resp SpO2   10/04/22 0758 127/78 97.8  F (36.6  C) Oral 99 16 100 %   10/04/22 0500 119/69 97.4  F (36.3  C) Oral 101 17 99 %   10/03/22 2211 114/67 98.7  F (37.1  C) Oral 99 16 99 %   10/03/22 1916 127/82 98  F (36.7  C) Oral 100 16 100 %   10/03/22 1802 -- 98.1  F (36.7  C) Oral -- -- --   10/03/22 1500 126/77 -- -- 92 16 98 %   10/03/22 1343 118/76 99.1  F (37.3  C) Oral -- 16 95 %       Wt Readings from Last 4 Encounters:   09/26/22 86.9 kg (191 lb 9.3 oz)       Gen: A&O x 3. NAD. Appears tired.  Wound status: Covered. Left chest wall dressing is c/d/i. Right knee dressing is c/d/i, Ace from mid-thigh to foot in place.   Circulation, motion and sensation: Dorsiflexion/plantarflexion intact and equal bilaterally; distal lower extremity sensation is intact and equal bilaterally. Foot and toes are warm and well perfused.    Swelling: Mild  Calf tenderness: calves are soft and non-tender bilaterally     Pertinent Labs   Lab Results: personally reviewed.     Recent Labs   Lab Test 10/04/22  0541 10/02/22  0516 10/01/22  0614 09/30/22  0601 09/28/22  0454 09/27/22  0454   HGB 9.2* 9.9* 9.9*  --  10.6* 10.4*   HCT 27.4* 29.6* 29.5*  --  32.2* 31.3*   MCV 87 87 86  --  87 87   * 676* 597*  --  412  280     --   --   --  141 141   .40* 217.99* 222.72*   < > 216.87* 269.53*    < > = values in this interval not displayed.       Plan:   Continue current cares and rehabilitation.  Anticoagulation protocol: ASA 81mg BID  x 30  days  Pain medications:  oxycodone and tylenol  Weight bearing status:  WBAT RLE in knee immobilizer, NWB LUE  Discussed with pt and RN; ok to remove large Ace wrap, replace with shorter wrap from mid-thigh to calf for improved comfort and ankle ROM.   CRP decreased today to 206 from 217 on 10/2. No growth to date on any additional cultures from surgical I&D on 10/2.  PICC line ordered per medicine.  Plan for dressings: leave right knee dressings intact until f/u at POD#7-10. Ok to change left chest wall dressing on POD#5.   Disposition:  Orthopedically stable. Discharge per medicine, possibly home later today if PICC in place and plan for IV abx in place.             Report completed by:  Leonardo Mccormick PA-C  Date: 10/4/2022  Time: 9:35 AM

## 2022-10-04 NOTE — PROGRESS NOTES
Infectious Disease [place] [facility] Note  I was called by Dr. Braxton on 10/4/2022 at 2:06pm to provide input for Sanket Guerrero MRN [id number]. The patient is located at [facility]. The nature of this request for a shayna consultation does not permit me to perform a comprehensive review of health care records, patient/family interview, nor an examination of the patient. I obtained limited patient information from the provider on the phone call and a limited chart review.    Based on only the information I was provided today, I make the following recommendations to the treating provider/team for their review and consideration:     Acute hepatitis while on ceftriaxone 2g Q24hrs for Septic joint infection of the knee and sternoclavicular joint from S. Agalactiae.    The patients bilirubin is normal but his ALT and AST are 284 and 146 respectively. The doctor states that the pt has no rash and is feeling well and at baseline. There is no eosinophilia. The doctor thinks its unlikely to be Dress syndrome. In addition, ceftriaxone is mostly noted to have pseudocholelithasis and not acute hepatitis. Therefore, its not clear if the ceftriaxone is contributing to the LFT elevation.     The pt should get a acute hepatitis panel and a RUQ US. If his hepatitis is due to a beta-lactam then all beta-lactams should be avoided and should be switched to a non-beta lactam abx such as daptomycin.     Will refer to Dr. Guthrie note from 9/27 for more details.     These recommendations are not intended to take the place of the care team's clinical judgement, which should always be utilized to provide the most appropriate care to meet the unique needs of each patient. The recommendations offered were based on the limited scope of information provided as today's date. Should additional guidance be needed or required a formal consultation with infectious diseases is recommended.    *If a patient is discharged on IV  antibiotics it is not the responsibility of the ID curbside provider to monitor labs or sign for antibiotic orders unless it is otherwise stated. If further outpatient management is required then an outpatient ID consult should be placed.

## 2022-10-04 NOTE — PLAN OF CARE
Temperature max 98.7 overnight. Patient received 10 mg oxycodone for knee pain. He reports adequate relief with oxycodone 5-10 mg. CMS intact right lower extremity and left upper extremity. Pulses positive. Ace clean, dry and intact on right leg. Dressing clean, dry and intact on left chest.

## 2022-10-04 NOTE — PROGRESS NOTES
"Care Management Follow Up    Length of Stay (days): 8    Expected Discharge Date: 10/04/2022     Concerns to be Addressed:   Home Infusion --PRIVATE PAY--has no insurance coverage    Patient plan of care discussed at interdisciplinary rounds: Yes    Anticipated Discharge Disposition: Home Infusion     Anticipated Discharge Services: Infusion services    Anticipated Discharge DME: None    Patient/family educated on Medicare website which has current facility and service quality ratings: yes  Education Provided on the Discharge Plan: yes    Patient/Family in Agreement with the Plan: yes    Referrals Placed by CM/SW: Home Infusion  Private pay costs discussed: insurance costs out of pocket expenses    Additional Information:  CM received notification back from [facility] Informed pt does NOT have benefits from the Infusion Center services.     \" Insurance only covers office visits and preventative care (which this is not)\"--per Giuseppe Cooley CM reached back out to Mya Calderon---RN I Liaison to investigate an updated Private Pay quote for [facility].     MD reported during IDT rounds, they are waiting PICC Placement and final determination of IV abx treatment.     CM will update [facility] once established.       YVONNE Rico      "

## 2022-10-05 ENCOUNTER — TELEPHONE (OUTPATIENT)
Dept: INFECTIOUS DISEASES | Facility: CLINIC | Age: 32
End: 2022-10-05

## 2022-10-05 VITALS
WEIGHT: 191.58 LBS | RESPIRATION RATE: 18 BRPM | DIASTOLIC BLOOD PRESSURE: 85 MMHG | TEMPERATURE: 98.5 F | HEART RATE: 105 BPM | SYSTOLIC BLOOD PRESSURE: 125 MMHG | HEIGHT: 72 IN | OXYGEN SATURATION: 97 % | BODY MASS INDEX: 25.95 KG/M2

## 2022-10-05 LAB
ALBUMIN SERPL BCG-MCNC: 3.2 G/DL (ref 3.5–5.2)
ALBUMIN SERPL ELPH-MCNC: 2.7 G/DL (ref 3.7–5.1)
ALP SERPL-CCNC: 237 U/L (ref 40–129)
ALPHA1 GLOB SERPL ELPH-MCNC: 0.7 G/DL (ref 0.2–0.4)
ALPHA2 GLOB SERPL ELPH-MCNC: 1.3 G/DL (ref 0.5–0.9)
ALT SERPL W P-5'-P-CCNC: 241 U/L (ref 10–50)
ANION GAP SERPL CALCULATED.3IONS-SCNC: 13 MMOL/L (ref 7–15)
AST SERPL W P-5'-P-CCNC: 91 U/L (ref 10–50)
B-GLOBULIN SERPL ELPH-MCNC: 1.1 G/DL (ref 0.6–1)
BACTERIA BLD CULT: NO GROWTH
BACTERIA BLD CULT: NO GROWTH
BILIRUB SERPL-MCNC: 0.4 MG/DL
BUN SERPL-MCNC: 13.1 MG/DL (ref 6–20)
CALCIUM SERPL-MCNC: 9.2 MG/DL (ref 8.6–10)
CHLORIDE SERPL-SCNC: 96 MMOL/L (ref 98–107)
CREAT SERPL-MCNC: 0.75 MG/DL (ref 0.67–1.17)
DEPRECATED HCO3 PLAS-SCNC: 27 MMOL/L (ref 22–29)
ERYTHROCYTE [DISTWIDTH] IN BLOOD BY AUTOMATED COUNT: 13 % (ref 10–15)
ERYTHROCYTE [SEDIMENTATION RATE] IN BLOOD BY WESTERGREN METHOD: 124 MM/HR (ref 0–15)
GAMMA GLOB SERPL ELPH-MCNC: 1.7 G/DL (ref 0.7–1.6)
GFR SERPL CREATININE-BSD FRML MDRD: >90 ML/MIN/1.73M2
GLUCOSE SERPL-MCNC: 116 MG/DL (ref 70–99)
HAV IGM SERPL QL IA: NONREACTIVE
HBV SURFACE AB SERPL IA-ACNC: >1000 M[IU]/ML
HBV SURFACE AB SERPL IA-ACNC: REACTIVE M[IU]/ML
HBV SURFACE AG SERPL QL IA: NONREACTIVE
HCT VFR BLD AUTO: 27.5 % (ref 40–53)
HCV AB SERPL QL IA: NONREACTIVE
HGB BLD-MCNC: 9.1 G/DL (ref 13.3–17.7)
M PROTEIN SERPL ELPH-MCNC: 0.2 G/DL
MCH RBC QN AUTO: 28.9 PG (ref 26.5–33)
MCHC RBC AUTO-ENTMCNC: 33.1 G/DL (ref 31.5–36.5)
MCV RBC AUTO: 87 FL (ref 78–100)
PLATELET # BLD AUTO: 793 10E3/UL (ref 150–450)
POTASSIUM SERPL-SCNC: 4.1 MMOL/L (ref 3.4–5.3)
PROT PATTERN SERPL ELPH-IMP: ABNORMAL
PROT SERPL-MCNC: 8.2 G/DL (ref 6.4–8.3)
RBC # BLD AUTO: 3.15 10E6/UL (ref 4.4–5.9)
RETICS # AUTO: 0.05 10E6/UL (ref 0.03–0.1)
RETICS/RBC NFR AUTO: 1.6 % (ref 0.5–2)
SODIUM SERPL-SCNC: 136 MMOL/L (ref 136–145)
WBC # BLD AUTO: 10.4 10E3/UL (ref 4–11)

## 2022-10-05 PROCEDURE — 250N000013 HC RX MED GY IP 250 OP 250 PS 637: Performed by: ORTHOPAEDIC SURGERY

## 2022-10-05 PROCEDURE — 85652 RBC SED RATE AUTOMATED: CPT | Performed by: INTERNAL MEDICINE

## 2022-10-05 PROCEDURE — 80053 COMPREHEN METABOLIC PANEL: CPT | Performed by: INTERNAL MEDICINE

## 2022-10-05 PROCEDURE — 85014 HEMATOCRIT: CPT | Performed by: INTERNAL MEDICINE

## 2022-10-05 PROCEDURE — 85045 AUTOMATED RETICULOCYTE COUNT: CPT | Performed by: INTERNAL MEDICINE

## 2022-10-05 PROCEDURE — 99239 HOSP IP/OBS DSCHRG MGMT >30: CPT | Performed by: INTERNAL MEDICINE

## 2022-10-05 PROCEDURE — 250N000011 HC RX IP 250 OP 636: Performed by: INTERNAL MEDICINE

## 2022-10-05 PROCEDURE — 82040 ASSAY OF SERUM ALBUMIN: CPT | Performed by: INTERNAL MEDICINE

## 2022-10-05 RX ORDER — CEFTRIAXONE 2 G/1
2 INJECTION, POWDER, FOR SOLUTION INTRAMUSCULAR; INTRAVENOUS EVERY 24 HOURS
Start: 2022-10-05 | End: 2022-10-20 | Stop reason: SINTOL

## 2022-10-05 RX ORDER — ACETAMINOPHEN 325 MG/1
650 TABLET ORAL EVERY 6 HOURS PRN
Status: ON HOLD | COMMUNITY
Start: 2022-10-05 | End: 2022-11-04

## 2022-10-05 RX ORDER — OXYCODONE HYDROCHLORIDE 5 MG/1
5 TABLET ORAL EVERY 4 HOURS PRN
Qty: 10 TABLET | Refills: 0 | Status: ON HOLD | OUTPATIENT
Start: 2022-10-05 | End: 2022-11-04

## 2022-10-05 RX ORDER — IBUPROFEN 400 MG/1
400 TABLET, FILM COATED ORAL EVERY 6 HOURS PRN
COMMUNITY
Start: 2022-10-05 | End: 2022-10-27

## 2022-10-05 RX ADMIN — OXYCODONE HYDROCHLORIDE 10 MG: 5 TABLET ORAL at 04:59

## 2022-10-05 RX ADMIN — SENNOSIDES AND DOCUSATE SODIUM 1 TABLET: 50; 8.6 TABLET ORAL at 08:12

## 2022-10-05 RX ADMIN — POLYETHYLENE GLYCOL 3350 17 G: 17 POWDER, FOR SOLUTION ORAL at 08:12

## 2022-10-05 RX ADMIN — ASPIRIN 81 MG: 81 TABLET, COATED ORAL at 08:12

## 2022-10-05 RX ADMIN — CEFTRIAXONE SODIUM 2 G: 2 INJECTION, POWDER, FOR SOLUTION INTRAMUSCULAR; INTRAVENOUS at 14:31

## 2022-10-05 RX ADMIN — OXYCODONE HYDROCHLORIDE 5 MG: 5 TABLET ORAL at 11:20

## 2022-10-05 ASSESSMENT — ACTIVITIES OF DAILY LIVING (ADL)
ADLS_ACUITY_SCORE: 41

## 2022-10-05 NOTE — PROGRESS NOTES
[facility] Orthopaedics Progress Note      Post-operative Day: 3 Days Post-Op    Procedure(s):  IRRIGATION AND DEBRIDEMENT, KNEE, ARTHROSCOPIC RIGHT  Left Sternoclavicular Joint Irrigation And Debridement  Subjective:    Pt reports that he is doing well today, no new complaints. He has minimal pain in the shoulder and the knee feels better with the smaller Ace wrap. He talked to  early this morning and is hopeful that he'll be able to go home today.     Chest pain, SOB:  No  Nausea, vomiting:  No  Lightheadedness, dizziness:  No  Neuro:  Patient denies new onset numbness or paresthesias      Objective:  Blood pressure 125/85, pulse 105, temperature 98.5  F (36.9  C), resp. rate 18, height 1.829 m (6'), weight 86.9 kg (191 lb 9.3 oz), SpO2 97 %.    Patient Vitals for the past 24 hrs:   BP Temp Temp src Pulse Resp SpO2   10/05/22 0818 -- 98.5  F (36.9  C) -- -- -- --   10/05/22 0758 125/85 99.8  F (37.7  C) Oral 105 18 97 %   10/05/22 0349 (!) 147/87 98.6  F (37  C) Oral 109 18 99 %   10/04/22 2244 137/82 98.3  F (36.8  C) Oral 95 16 99 %   10/04/22 1604 (!) 146/89 98.5  F (36.9  C) Oral 116 16 100 %       Wt Readings from Last 4 Encounters:   09/26/22 86.9 kg (191 lb 9.3 oz)       Gen: A&O x 3. NAD. Appears comfortable.   Wound status: Left chest wall, right knee dressing are c/d/i.   Circulation, motion and sensation: Dorsiflexion/plantarflexion intact and equal bilaterally; distal lower and upper extremity sensation is intact and equal bilaterally. Foot and toes are warm and well perfused.    Swelling: Minimal at the left chest wall  Calf tenderness: calves are soft and non-tender bilaterally     Pertinent Labs   Lab Results: personally reviewed.     Recent Labs   Lab Test 10/05/22  0508 10/04/22  1730 10/04/22  0541 10/02/22  0516 10/01/22  0614 09/30/22  0601 09/28/22  0454   INR  --  1.14  --   --   --   --   --    HGB 9.1*  --  9.2* 9.9* 9.9*  --  10.6*   HCT 27.5*  --  27.4* 29.6* 29.5*  --  32.2*    MCV 87  --  87 87 86  --  87   *  --  740* 676* 597*  --  412     --  136  --   --   --  141   CRP  --   --  206.40* 217.99* 222.72*   < > 216.87*    < > = values in this interval not displayed.       Plan:   Continue current cares and rehabilitation.  Anticoagulation protocol: ASA 81mg BID  x 30  days  Pain medications: oxycodone and tylenol  Weight bearing status:  WBAT RLE, NWB LUE  PICC in place. Current plan for IV ceftriaxone,  to confirm with ID in light of recent improvement in LFTs.   Disposition: Orthopedically stable. Discharge per medicine.             Report completed by:  Leonardo Mccormick PA-C  Date: 10/5/2022  Time: 10:36 AM

## 2022-10-05 NOTE — DISCHARGE SUMMARY
[facility]  Discharge Summary  Hospital Medicine       Date of Admission:  9/25/2022  Date of Discharge:  10/5/2022  4:33 PM  Discharging Provider: Alvaro Brxaton MD      Identification and Chief Compaint: Sanket Guerrero is a 32 year old male who was admitted on 9/25/2022 with several days of pain and swelling of the right knee and also pain at the left sternoclavicular joint.    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 joint and right knee   at 7-10 days after your last surgery on 10/2. Call [facility] scheduling at [phone] to make an appointment.         Follow-up and recommended labs and tests       Outpatient referral to Infectious Disease, [facility], in the next 7-10 days. Establish primary care in the next few   weeks to follow the anemia and the prior history of recurrent knee   effusions.             Unresulted Labs Ordered in the Past 30 Days of this Admission     Date and Time Order Name Status Description    10/2/2022  1:28 PM Synovial fluid Aerobic Bacterial Culture Routine Preliminary     10/2/2022  1:28 PM Anaerobic Bacterial Culture Routine Preliminary     10/1/2022  3:04 PM Blood Culture Hand, Right Preliminary     9/26/2022  3:05 PM Anaerobic Bacterial Culture Routine Preliminary     9/26/2022  2:48 PM Anaerobic Bacterial Culture Routine Preliminary       These results will be followed up by ID consult as needed    Hospital Course      Septic left sternoclavicular joint due  to Strep agalactiae   Septic right knee due to Strep agalactiae  S/p right knee washout x 3 (9/26, 9/28, 10/2)  S/p left sternoclavicular joint washout x 2 (9/28, 10/2)     Left sternoclavicular joint, left shoulder and right knee inflammatory process. Right knee aspirate 9/25/2022 cell count 353k WBC though only 24% neutrophils and no crystals. Concern for septic knee with that WBC count. CRP also very high 310. S/p right knee washout and left sternoclavicular joint aspiration on 9/26/2022. Somewhat surprisingly, cultures from both joints positive for Group B Strep.      ID:     Blood Cx 9/25/2022 NGTD    Right knee aspirate 9/25/2022 positive for Strep agalactiae    Blood Cx 9/26/2022 NGTD    Left sternoclavicular joint aspirate 9/26/2022 positive Strep agalactiae    Right knee synovial fluid 9/26/2022 NGTD    Chlamydia and N gonorrhea PCR on urine negative 9/26/2022    MRSA/MSSA swab negative 9/27/2022    Blood Cx 9/27/2022 NGTD    Blood Cx 9/28/2022 NGTD    Blood Cx 9/29/2022 NGTD    Blood Cx 9/30/2022 NGTD    Blood Cx 10/01/2022 NGTD    Right knee synovial fluid Cx 10/2/2022 NGTD     Antibiotics:    Started on empiric treatment with cefepime and vancomycin on 9/26 - 9/27/2022    Change to ceftriaxone 2 g q 24 hrs on 9/27/2022 - present      Imaging:    TTE negative for endocarditis 9/28/2022    Bilateral lower extremity venous Doppler US neg for DVT 10/1/2022    CT head, neck, and chest/abd/pelvis 10/1/2022 notable only for persistent sternoclavicular joint effusion    RUQ US 10/4/2022        Source of the Group B Strep is not clear. TTE negative and valves are normal. Multiple blood cultures are negative. Pan-scan shows no obvious source or other focus of infection. Patient is otherwise very healthy appearing.     Only mild leucocytosis 12.5 on presentation which improved.      ?  270 ?  217 ?  213 ?  223 ?  218 ?  206 (10/4)    Recurrent, intermittent fevers since admission, Tm 102.9 (10/1). Fever  again last .2. Afebrile today 10/3 through the afternoon.     PICC placed 10/4 after > 24 hours afebrile. Discharge was delayed another day due to concern over rising LFTs (see below).     Discussed with ID and plan for 4 weeks of IV ceftriaxone. Patient to establish with ID in next week or so. Weekly labs through home infusion. Follow up with Dr. Hahn as directed by his clinic.      Elevated LFTs    Progressive increase in Alk phos, ALT and AST were normal on admission but progressively increased. Bilirubin normal.  Ceftriaxone considered as potential cause but no rash, no eosinophilia and hepatic toxicity, when it occurs, is usually after weeks of ceftriaxone therapy.  Also transaminases noted to be increasing on lab draw 9/27 which was prior to starting ceftriaxone later that day.  Ceftriaxone ruled out as cause. RUQ exam is not remarkable. Rechecked LFTs on 10/4 and markedly up from last check 9/28. Discussed with infectious disease curbside consult 10/4, and we would like to see transaminases improving prior to discharge.  ALP/ALT/AST 10/4 /322/214 ?  10/4 /284/146 ?  10/5 /241/91 trending down over past day.  RUQ US 10/4 normal  Hepatitis A, B and C serologies are pending.     LFTs are trending down. Consider follow up as outpatient if develops symptoms.      Anemia    Suspect anemia of acute on chronic inflammation. Hgb 12.5 on admission with normal MCV.  Hemoglobin has drifted down to 9.1 (10/5).  No evidence of bleeding. Ferritin 1328, iron 12, TIBC 184, iron sat 7%. Retic count is only 1.6%.     The ferritin is an acute phase reactant and also suggests good iron stores. Iron studies consistent with anemia of inflammation. This should improve as underlying infection is treated.      Thrombocytosis    Platelets steadily rising. 218 ?  ?  740 ?  793. Likely acute phase reactant. Should resolve as infections are treated.      Patient reports recurrent episodes of monoarticular  arthritis    More than 10 year history of recurrent effusions mostly in the right knee but sometimes the left.  Patient felt these were often associated with episodes of beer drinking.  However he drinks beer frequently so difficult to say at that the really the etiology.  Past episodes have been without much pain or discomfort.  He always felt it was gout though never actually saw physician.  The effusions would go away spontaneously over a few days.      This episode was unusual at that there was a lot of pain in the knee and the effusion was much larger.  He has swelling also in the left sternoclavicular joint this time which is new.   He and his longtime girlfriend were tested for STDs about 5 years ago and were negative and he had had joint effusions before and after that and GC/Chlamydia PCR negative this admission. Lyme antibodies and PCR negative.    - Consider referral to rheumatology once infections resolved.    Consultations This Hospital Stay   ORTHOPEDIC SURGERY IP CONSULT    Ancillary Consultations This Hospital Stay   PHYSICAL THERAPY ADULT IP CONSULT  OCCUPATIONAL THERAPY ADULT IP CONSULT  CARE MANAGEMENT / SOCIAL WORK IP CONSULT  VASCULAR ACCESS ADULT IP CONSULT    Code Status   Full Code    The discharge plan was discussed with the patient and his significant other, and they expressed understanding.     Time Spent on this Encounter   Total time on this discharge was 55 minutes over two visits.       Alvaro Braxton MD  [facility]  ______________________________________________________________________    Physical Exam   Vital Signs: Temp: 98.5  F (36.9  C) (used different thermometer) Temp src: Oral BP: 125/85 Pulse: 105   Resp: 18 SpO2: 97 % O2 Device: None (Room air)    Weight: 191 lbs 9.28 oz  Constitutional: alert and oriented, NAD, nontoxic  CV: Regular  Respiratory: CTA bilaterally  GI: Soft, non-tender   Skin: Warm and dry  Musculoskeletal: right knee wrapped,  left SCJ surgical site dressed. Able to reach left upper extremity across chest better than admission        Primary Care Physician   Physician No Ref-Primary  No address on file     Discharge Disposition   Discharged to home with home infusion  Condition at discharge: Stable    Significant Results and Procedures   Results for orders placed or performed during the hospital encounter of 09/25/22   XR Chest 2 Views    Narrative    EXAM: XR CHEST 2 VIEWS  LOCATION: [facility]  DATE/TIME: 9/25/2022 4:18 PM    INDICATION: Left chest and shoulder pain  COMPARISON: None.      Impression    IMPRESSION: Cardiomediastinal silhouette is normal. Normal vasculature. Lungs and pleural spaces are clear. No fracture evident.   XR Knee Right 3 Views    Narrative    EXAM: XR KNEE RIGHT 3 VIEWS  LOCATION: [facility]  DATE/TIME: 9/25/2022 4:16 PM    INDICATION: pain  COMPARISON: None.      Impression    IMPRESSION: Normal joint spaces and alignment. No fracture. Moderate joint effusion.   CT Knee Right w Contrast    Narrative    EXAM: CT KNEE RIGHT W CONTRAST  LOCATION: [facility]  DATE/TIME: 9/25/2022 8:46 PM    INDICATION: Right distal femur anterior swelling and knee swelling. Rule out abscess or other abnormality.  COMPARISON: Radiographs from 9/25/2022.  TECHNIQUE: IV contrast. Axial, sagittal and coronal thin-section reconstruction. Dose reduction techniques were used.   CONTRAST: 100 mL Isovue 370.    FINDINGS:     BONES AND JOINTS:  -There is a massive knee joint effusion. No calcified intra-articular body. No fracture. Mild osteoarthrosis of the patellofemoral joint.    SOFT TISSUES:  -No hematoma, cyst or mass. No gross muscle or tendon pathology. No evidence of abscess.      Impression    IMPRESSION:  1.  Massive knee joint effusion.  2.  Mild osteoarthrosis of the patellofemoral joint.  3.  Otherwise negative.     CT Sternum SC Joints  without Contrast    Narrative    EXAM: CT STERNUM SC JOINTS WITHOUT CONTRAST  LOCATION: [facility]  DATE/TIME: 9/27/2022 6:28 PM    INDICATION: Chest pain. Left sternoclavicular joint effusion tapped and positive for Group B Strep, evaluate for extent of septic effusion inflammation.  COMPARISON: None.  TECHNIQUE: With IV contrast. Axial, sagittal and coronal thin-section reconstruction. Dose reduction techniques were used.   CONTRAST: 85 mL Isovue-300 were administered.    FINDINGS:     BONES:  -No fracture. No definite evidence of osteomyelitis. Small accessory ossicle at the cephalad margin of the manubrium. Normal alignment.     SOFT TISSUES:  -Moderate-sized effusion within the left sternoclavicular joint with mild overlying soft tissue swelling and localized soft tissue stranding consistent with the patient's history of septic arthritis. No adenopathy. No discrete fluid collection to suggest   an abscess. Visualized portions of both upper lungs normal. Mediastinum unremarkable. Normal appearing right sternoclavicular joint.      Impression    IMPRESSION:  1.  No evidence of an abscess or osteomyelitis.    2.  Moderate-sized effusion within the left sternoclavicular joint with overlying soft tissue stranding compatible with the history of septic arthritis.     CT Soft tissue neck w contrast*    Narrative    EXAM: CT SOFT TISSUE NECK W CONTRAST  LOCATION: [facility]  DATE/TIME: 10/1/2022 4:25 PM    INDICATION: Recurrent fevers. Polyarticular infectious/inflammatory process, eval for additional abscess or infection  COMPARISON: None.  CONTRAST: 50 mL Isovue 370  TECHNIQUE: Routine CT Soft Tissue Neck with IV contrast. Multiplanar reformats. Dose reduction techniques were used.    FINDINGS:     MUCOSAL SPACES/SOFT TISSUES: Normal mucosal spaces of the upper aerodigestive tract. No mucosal mass or inflammation identified. Normal vocal cords and infraglottic  trachea. Normal parapharyngeal space and  spaces. Normal oral cavity,    spaces, and floor of mouth structures.    LYMPH NODES: Shotty bilateral cervical lymph nodes, none frankly pathologic by size or morphology criteria.     SALIVARY GLANDS: Normal parotid and submandibular glands.    THYROID: Normal.     VESSELS: Vascular structures of the neck are patent.    VISUALIZED INTRACRANIAL/ORBITS/SINUSES: No abnormality of the visualized intracranial compartment or orbits. Visualized paranasal sinuses and mastoid air cells are clear.    OTHER: Left sternoclavicular joint effusion without aggressive/erosive osseous changes. Overlying cutaneous fat stranding and small locules of subcutaneous gas consistent with surgical drainage. Otherwise unremarkable visualized osseous structures. The   included lung apices are clear.      Impression    IMPRESSION:   1.  Persistent left sternoclavicular joint effusion as seen on dedicated CT from 09/27/2022. Changes of interval surgical drainage without evidence for aggressive osseous erosion/destruction.  2.  No additional evidence for an infectious/inflammatory process in the neck.  3.  Nonspecific shotty cervical lymph nodes are presumably reactive in nature.   CT Chest/Abdomen/Pelvis w Contrast    Narrative    EXAM: CT CHEST/ABDOMEN/PELVIS W CONTRAST  LOCATION: [facility]  DATE/TIME: 10/1/2022 4:35 PM    INDICATION: Fever, eval for abscess or infection.  COMPARISON: None.  TECHNIQUE: CT scan of the chest, abdomen, and pelvis was performed following injection of IV contrast. Multiplanar reformats were obtained. Dose reduction techniques were used.   CONTRAST: 50 mL Isovue 370.    FINDINGS:   LUNGS AND PLEURA: Normal.    MEDIASTINUM/AXILLAE: Normal.    CORONARY ARTERY CALCIFICATION: None.    HEPATOBILIARY: Normal.    PANCREAS: Normal.    SPLEEN: Normal.    ADRENAL GLANDS: Normal.    KIDNEYS/BLADDER: Normal.    BOWEL: Normal.    LYMPH  NODES: Normal.    VASCULATURE: Unremarkable.    PELVIC ORGANS: Normal.    MUSCULOSKELETAL: Soft tissue swelling overlying the left sternoclavicular joint with a bubble of air present superficially. This could be due to infection. There is no evidence for osteomyelitis.      Impression    IMPRESSION:  1.  Soft tissue swelling left sternoclavicular joint with some soft tissue air present suspicious for infection. No CT evidence for osteomyelitis.   CT Head w contrast    Narrative    EXAM: CT HEAD W CONTRAST  LOCATION: [facility]  DATE/TIME: 10/1/2022 4:44 PM    INDICATION: Recurrent fevers. Concern for polyarticular septic arthritis. Eval for abscess or infection  COMPARISON: None.  CONTRAST: 25 mL Isovue 370  TECHNIQUE: Routine CT Head with IV contrast.  Dose reduction techniques were used.    FINDINGS:  INTRACRANIAL CONTENTS: No intracranial hemorrhage, extraaxial collection, or mass effect.  No CT evidence of acute infarct. Normal parenchymal attenuation. Normal ventricles and sulci. No pathologic enhancement identified.    VISUALIZED ORBITS/SINUSES/MASTOIDS: No intraorbital abnormality. No paranasal sinus mucosal disease. No middle ear or mastoid effusion.    BONES/SOFT TISSUES: No acute abnormality.      Impression    IMPRESSION:  1.  No evidence for acute intracranial process.   US Lower Extremity Venous Duplex Bilateral    Narrative    EXAM: US LOWER EXTREMITY VENOUS DUPLEX BILATERAL  LOCATION: [facility]  DATE/TIME: 10/1/2022 5:19 PM    INDICATION: Pain and swelling.  COMPARISON: None.  TECHNIQUE: Venous Duplex ultrasound of bilateral lower extremities with and without compression, augmentation and duplex. Color flow and spectral Doppler with waveform analysis performed.    FINDINGS: Exam includes the common femoral, femoral, popliteal veins as well as segmentally visualized deep calf veins and greater saphenous vein.     RIGHT: No deep vein thrombosis.  No superficial thrombophlebitis. No popliteal cyst.    LEFT: No deep vein thrombosis. No superficial thrombophlebitis. No popliteal cyst.      Impression    IMPRESSION:  1.  No deep venous thrombosis in the bilateral lower extremities.   XR Chest Port 1 View    Narrative    XR CHEST PORT 1 VIEW   10/4/2022 1:59 PM     HISTORY: RN placed PICC - verify tip placement    COMPARISON: CT 10/1/2022.      Impression    IMPRESSION: Placement of right upper extremity PICC, tip overlying the  superior vena cava. Normal cardiomediastinal silhouette. Lungs are  clear. No acute bony abnormality.    HELIO ACOSTA MD         SYSTEM ID:  [id number]   US Abdomen Limited    Narrative    EXAM: US ABDOMEN LIMITED  LOCATION: [facility]  DATE/TIME: 10/4/2022 7:23 PM    INDICATION: Increasing liver enzymes. Mild right upper quadrant tenderness.  COMPARISON: CT abdomen pelvis 10/01/2022.  TECHNIQUE: Limited abdominal ultrasound.    FINDINGS:    GALLBLADDER: Normal. No gallstones, wall thickening, or pericholecystic fluid. Negative sonographic Ramos's sign.    BILE DUCTS: No biliary dilatation. The common duct measures 4 mm.    LIVER: Normal parenchyma with smooth contour. No focal mass.    RIGHT KIDNEY: No hydronephrosis.    PANCREAS: The visualized portions are normal.    No ascites.      Impression    IMPRESSION:    1.  Normal limited abdominal ultrasound.   Echocardiogram Complete     Value    LVEF  60-65%    Narrative    [id number]  [id number]  [id number]  [id number]^JOEL^MELANY^MADHU     [facility]  Echocardiography Laboratory  [address] [place] [address]     Name: SEDRICK TONY  MRN: [id number]  : 1990  Study Date: 2022 10:59 AM  Age: 32 yrs  Gender: Male  Patient Location: [facility]  Reason For Study: Endocarditis  Ordering Physician: MELANY MALDONADO  Performed By: Mary Russell RDCS     BSA: 2.1 m2  Height: 72 in  Weight: 191 lb  HR: 99  BP: 119/64  mmHg  ______________________________________________________________________________  Procedure  Complete Portable Echo Adult.  ______________________________________________________________________________  Interpretation Summary     Left ventricular systolic function is normal.  The visual ejection fraction is 60-65%.  The right ventricle is normal in structure, function and size.  Valve structures without significant dysfunction. No apparent valvular  vegetations.  The inferior vena cava was normal in size with preserved respiratory  variability.  There is no pericardial effusion.     No prior study for comparison. Consider LINDA if clnically indicated to assess  for endocarditis.  ______________________________________________________________________________  Left Ventricle  The left ventricle is normal in structure, function and size. Left ventricular  systolic function is normal. The visual ejection fraction is 60-65%. Normal  left ventricular wall motion.     Right Ventricle  The right ventricle is normal in structure, function and size.     Atria  Normal left atrial size. Right atrial size is normal.     Mitral Valve  The mitral valve is normal in structure and function.     Tricuspid Valve  The tricuspid valve is normal in structure and function. Right ventricular  systolic pressure could not be approximated due to inadequate tricuspid  regurgitation.     Aortic Valve  The aortic valve is normal in structure and function.     Pulmonic Valve  The pulmonic valve is normal in structure and function.     Vessels  The aortic root is normal size. Normal size ascending aorta. The inferior vena  cava was normal in size with preserved respiratory variability.     Pericardium  There is no pericardial effusion.     ______________________________________________________________________________  MMode/2D Measurements & Calculations  IVSd: 1.1 cm  LVIDd: 4.7 cm  LVIDs: 2.8 cm  LVPWd: 0.97 cm  FS: 39.5 %     LV mass(C)d:  169.1 grams  LV mass(C)dI: 80.9 grams/m2  Ao root diam: 3.4 cm  LA dimension: 2.9 cm  asc Aorta Diam: 2.9 cm  LA/Ao: 0.84  RWT: 0.41     Doppler Measurements & Calculations  MV E max jerri: 84.9 cm/sec  MV A max jerri: 96.1 cm/sec  MV E/A: 0.88  MV dec time: 0.13 sec  PA acc time: 0.12 sec  E/E' av.5  Lateral E/e': 8.5  Medial E/e': 8.6     ______________________________________________________________________________  Report approved by: Kwaku Mota 2022 02:08 PM             Discharge Orders      Orthopedic  Referral      Primary Care - Care Coordination Referral      Infectious Disease Referral      Home Infusion Referral      Activity    Your activity upon discharge:   1. Sling to the left shoulder as desired, gentle ROM of the left shoulder as tolerated.  2. No weight bearing through your left arm.  3. Knee immobilizer to the right knee as needed for support when weight bearing, ok to remove at rest.   4. Right knee range of motion as tolerated.   5. Weight bearing as tolerated on the right leg.     Follow-up and recommended labs and tests     Follow up with Dr.Nels Hahn for your left S.C. joint and right knee at 7-10 days after your last surgery on 10/2. Call [facility] scheduling at [phone] to make an appointment.     Wound care and dressings    Instructions to care for your wound at home:   Ok to change your left chest on post op day #5. Apply dry gauze dressings, change as needed and keep dry when showering. Leave right knee dressings intact until follow up, ok to rewrap Ace wrap as needed. Keep dry.     Reason for your hospital stay    Streptococcal septic arthritis of right knee and left sternoclavicular joint     Follow-up and recommended labs and tests     Outpatient referral to Infectious Disease, [facility], in the next 7-10 days. Establish primary care in the next few weeks to follow the anemia and the prior history of recurrent  knee effusions.     Activity    Your activity upon discharge: activity as tolerated     Diet    Follow this diet upon discharge:  Regular Diet Adult     Discharge Medications   Current Discharge Medication List      START taking these medications    Details   acetaminophen (TYLENOL) 325 MG tablet Take 2 tablets (650 mg) by mouth every 6 hours as needed for mild pain, other or fever (and adjunct with moderate or severe pain or per patient request)    Associated Diagnoses: Streptococcal arthritis of right knee (H)      amoxicillin-clavulanate (AUGMENTIN) 875-125 MG tablet Take 1 tablet by mouth 2 times daily  Qty: 20 tablet, Refills: 0      aspirin (ASA) 81 MG EC tablet Take 1 tablet (81 mg) by mouth 2 times daily for 30 days    Associated Diagnoses: Streptococcal arthritis of right knee (H)      cefTRIAXone (ROCEPHIN) 2 GM vial Inject 2 g into the vein every 24 hours Through 10/29/2022    Associated Diagnoses: Streptococcal arthritis of right knee (H)      ibuprofen (ADVIL/MOTRIN) 400 MG tablet Take 1 tablet (400 mg) by mouth every 6 hours as needed for moderate pain or fever    Associated Diagnoses: Streptococcal arthritis of right knee (H)      naproxen (NAPROSYN) 500 MG tablet Take 1 tablet (500 mg) by mouth 2 times daily (with meals) for 12 days  Qty: 24 tablet, Refills: 0      oxyCODONE-acetaminophen (PERCOCET) 5-325 MG tablet Take 1 tablet by mouth every 6 hours as needed for severe pain (7-10)  Qty: 6 tablet, Refills: 0         CONTINUE these medications which have NOT CHANGED    Details   cetirizine (ZYRTEC) 10 MG tablet Take 10 mg by mouth daily as needed for allergies      loratadine (CLARITIN) 10 MG tablet Take 10 mg by mouth daily as needed for allergies           Allergies   No Known Allergies

## 2022-10-05 NOTE — TELEPHONE ENCOUNTER
[facility] Call Center    Phone Message    May a detailed message be left on voicemail: no     Reason for Call: Appointment Intake    Referring Provider Name: Alvaro Braxton    Diagnosis and/or Symptoms: Streptococcal arthritis of right knee    Per Haley, pt needs to be seen by any provider within 1 week for a hospital follow up. Please call pt and advise.       Action Taken: Message routed to:  Other: ID    Travel Screening: Not Applicable

## 2022-10-05 NOTE — PLAN OF CARE
Patient reports adequate relief of pain with 10 mg oxycodone. Dressings clean dry and intact. CMS intact left upper extremity and right lower extremity. Blood pressure (!) 147/87, pulse 109, temperature 98.6  F (37  C), temperature source Oral, resp. rate 18, height 1.829 m (6'), weight 86.9 kg (191 lb 9.3 oz), SpO2 99 %.

## 2022-10-05 NOTE — PROGRESS NOTES
[facility]    Hospitalist Progress Note    Date of Service (when I saw the patient): 10/05/2022    Assessment & Plan   Sanket Guerrero is a 32 year old male who was admitted on 9/25/2022 with several days of pain and swelling of the right knee and also pain at the left sternoclavicular joint.    Septic left sternoclavicular joint due to Strep agalactiae   Septic right knee due to Strep agalactiae  S/p right knee washout x 3 (9/26, 9/28, 10/2)  S/p left sternoclavicular joint washout x 2 (9/28, 10/2)     Left sternoclavicular joint, left shoulder and right knee inflammatory process. Right knee aspirate 9/25/2022 cell count 353k WBC though only 24% neutrophils and no crystals. Concern for septic knee with that WBC count. CRP also very high 310. S/p right knee washout and left sternoclavicular joint aspiration on 9/26/2022. Somewhat surprisingly, cultures from both joints positive for Group B Strep.     ID:     Blood Cx 9/25/2022 NGTD    Right knee aspirate 9/25/2022 positive for Strep agalactiae    Blood Cx 9/26/2022 NGTD    Left sternoclavicular joint aspirate 9/26/2022 positive Strep agalactiae    Right knee synovial fluid 9/26/2022 NGTD    Chlamydia and N gonorrhea PCR on urine negative 9/26/2022    MRSA/MSSA swab negative 9/27/2022    Blood Cx 9/27/2022 NGTD    Blood Cx 9/28/2022 NGTD    Blood Cx 9/29/2022 NGTD    Blood Cx 9/30/2022 NGTD    Blood Cx 10/01/2022 NGTD    Right knee synovial fluid Cx 10/2/2022 NGTD    Antibiotics:    Started on empiric treatment with cefepime and vancomycin on 9/26 - 9/27/2022    Change to ceftriaxone 2 g q 24 hrs on 9/27/2022 - present     Imaging:    TTE negative for endocarditis 9/28/2022    Bilateral lower extremity venous Doppler US neg for DVT 10/1/2022    CT head, neck, and chest/abd/pelvis 10/1/2022 notable only for persistent sternoclavicular joint effusion    RUQ US 10/4/2022       Source of the Group B Strep is not clear. TTE negative and valves are  normal. Multiple blood cultures are negative. Pan-scan shows no obvious source or other focus of infection. Patient is otherwise very healthy appearing.     Only mild leucocytosis 12.5 on presentation which improved.      ?  270 ?  217 ?  213 ?  223 ?  218 ?  206 (10/4)    Recurrent, intermittent fevers since admission, Tm 102.9 (10/1). Fever again last .2. Afebrile today 10/3 through the afternoon.     PICC placed 10/4 after > 24 hours afebrile.    - Continue ceftriaxone, plan 4 week long course   - will discuss with ID again today, expect patient can discharge     Elevated LFTs    Progressive increase in Alk phos, ALT and AST were normal on admission but progressively increased. Bilirubin normal.  Ceftriaxone considered as potential cause but no rash, no eosinophilia and hepatic toxicity, when it occurs, is usually after weeks of ceftriaxone therapy.  Also transaminases noted to be increasing on lab draw 9/27 which was prior to starting ceftriaxone later that day.  Ceftriaxone ruled out as cause. RUQ exam is not remarkable. Rechecked LFTs on 10/4 and markedly up from last check 9/28. Discussed with infectious disease curbside consult 10/4, and we would like to see transaminases improving prior to discharge.  ALP/ALT/AST 10/4 /322/214 ?  10/4 /284/146 ?  10/5 /241/91 trending down over past day.  RUQ US 10/4 normal  Hepatitis A, B and C serologies are pending.     LFTs are trending down. Consider follow up as outpatient if develops symptoms.     Anemia    Suspect anemia of acute on chronic inflammation. Hgb 12.5 on admission with normal MCV.  Hemoglobin has drifted down to 9.1 (10/5).  No evidence of bleeding. Ferritin 1328, iron 12, TIBC 184, iron sat 7%. Retic count is only 1.6%.     The ferritin is an acute phase reactant and also suggests good iron stores. Iron studies consistent with anemia of inflammation. This should improve as underlying infection is treated.       Thrombocytosis    Platelets steadily rising. 218 ?  ?  740 ?  793. Likely acute phase reactant. Should resolve as infections are treated.     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  Patient felt these were often associated with episodes of beer drinking.  However he drinks beer frequently so difficult to say at that the really the etiology.  Past episodes have been without much pain or discomfort.  He always felt it was gout though never actually saw physician.  The effusions would go away spontaneously over a few days.      This episode was unusual at that there was a lot of pain in the knee and the effusion was much larger.  He has swelling also in the left sternoclavicular joint this time which is new.   He and his longtime girlfriend were tested for STDs about 5 years ago and were negative and he had had joint effusions before and after that and GC/Chlamydia PCR negative this admission. Lyme antibodies and PCR negative.    - Consider referral to rheumatology once infections resolved.     Diet: Advance Diet as Tolerated: Regular Diet Adult    DVT Prophylaxis: Low Risk/Ambulatory with no VTE prophylaxis indicated  [place] Catheter: Not present  Central Lines: None  Code Status: Full Code        Discussion: Doing well.     Disposition Plan   Discharge with home infusion later today    Attestation:  Total time: 20 minutes this visit    Alvaro Braxton MD  Hospital Medicine        Interval History   No new complaints. Feels good.     Physical Exam   Temp:  [97.8  F (36.6  C)-98.6  F (37  C)] 98.6  F (37  C)  Pulse:  [] 109  Resp:  [16-18] 18  BP: (127-147)/(78-89) 147/87  SpO2:  [99 %-100 %] 99 %    Weights:   Vitals:    09/25/22 1521 09/26/22 0303   Weight: 85.7 kg (189 lb) 86.9 kg (191 lb 9.3 oz)    Body mass index is 25.98 kg/m .    Constitutional: alert and oriented, NAD, nontoxic  CV:  Regular, no murmur  Respiratory: CTA bilaterally  GI: Soft, non-tender, bowel sounds normal  Skin: Warm and dry    Data   Recent Labs   Lab 10/05/22  0508 10/04/22  1730 10/04/22  0541 10/04/22  0541 10/02/22  0516 10/01/22  0614 09/29/22  0616   WBC 10.4  --   --  10.5 12.3*   < >  --    HGB 9.1*  --   --  9.2* 9.9*   < >  --    MCV 87  --   --  87 87   < >  --    *  --   --  740* 676*   < >  --    INR  --  1.14  --   --   --   --   --      --   --  136  --   --   --    POTASSIUM 4.1  --   --  4.3  --   --   --    CHLORIDE 96*  --   --  98  --   --   --    CO2 27  --   --  25  --   --   --    BUN 13.1  --   --  11.6  --   --   --    CR 0.75  --   --  0.72  --   --  0.82   ANIONGAP 13  --   --  13  --   --   --    JOCELYNE 9.2  --   --  9.1  --   --   --    *  --   --  137*  --   --   --    ALBUMIN 3.2* 3.1*   < > 3.2*  --   --   --    PROTTOTAL 8.2 7.9   < > 7.9  --   --   --    BILITOTAL 0.4 0.4   < > 0.4  --   --   --    ALKPHOS 237* 263*   < > 259*  --   --   --    * 284*   < > 322*  --   --   --    AST 91* 146*   < > 214*  --   --   --     < > = values in this interval not displayed.       Recent Labs   Lab 10/05/22  0508 10/04/22  0541   * 137*        Unresulted Labs Ordered in the Past 30 Days of this Admission     Date and Time Order Name Status Description    10/4/2022  5:29 PM Protein Electrophoresis, Serum In process     10/4/2022  4:51 PM Hepatitis B Surface Antibody In process     10/4/2022  4:51 PM Hepatitis C antibody In process     10/4/2022  4:51 PM Hepatitis B surface antigen In process     10/4/2022  4:51 PM Hepatitis A antibody IgM In process     10/2/2022  1:28 PM Synovial fluid Aerobic Bacterial Culture Routine Preliminary     10/2/2022  1:28 PM Anaerobic Bacterial Culture Routine Preliminary     10/1/2022  3:04 PM Blood Culture Hand, Right Preliminary     9/30/2022  9:21 AM Blood Culture Arm, Right Preliminary     9/30/2022  9:21 AM Blood Culture Arm, Left  Preliminary     9/26/2022  3:05 PM Anaerobic Bacterial Culture Routine Preliminary     9/26/2022  2:48 PM Anaerobic Bacterial Culture Routine Preliminary            Imaging:   Recent Results (from the past 24 hour(s))   XR Chest Port 1 View    Narrative    XR CHEST PORT 1 VIEW   10/4/2022 1:59 PM     HISTORY: RN placed PICC - verify tip placement    COMPARISON: CT 10/1/2022.      Impression    IMPRESSION: Placement of right upper extremity PICC, tip overlying the  superior vena cava. Normal cardiomediastinal silhouette. Lungs are  clear. No acute bony abnormality.    HELIO ACOSTA MD         SYSTEM ID:  [id number]   US Abdomen Limited    Narrative    EXAM: US ABDOMEN LIMITED  LOCATION: [facility]  DATE/TIME: 10/4/2022 7:23 PM    INDICATION: Increasing liver enzymes. Mild right upper quadrant tenderness.  COMPARISON: CT abdomen pelvis 10/01/2022.  TECHNIQUE: Limited abdominal ultrasound.    FINDINGS:    GALLBLADDER: Normal. No gallstones, wall thickening, or pericholecystic fluid. Negative sonographic Ramos's sign.    BILE DUCTS: No biliary dilatation. The common duct measures 4 mm.    LIVER: Normal parenchyma with smooth contour. No focal mass.    RIGHT KIDNEY: No hydronephrosis.    PANCREAS: The visualized portions are normal.    No ascites.      Impression    IMPRESSION:    1.  Normal limited abdominal ultrasound.        I reviewed all new labs and imaging results over the last 24 hours. I personally reviewed no images or EKG's today.    Medications       aspirin  81 mg Oral BID     cefTRIAXone  2 g Intravenous Q24H     polyethylene glycol  17 g Oral Daily     senna-docusate  1 tablet Oral BID     sodium chloride (PF)  10-40 mL Intracatheter Q7 Days     sodium chloride (PF)  3 mL Intracatheter Q8H   Reviewed jarred Braxton MD  [facility]

## 2022-10-05 NOTE — PROGRESS NOTES
Patient alert and oriented x 4. Vitals stable; on room air. Pain in R knee 4-7/10, partially relieved with PRN oxycodone. Independent in room. Patient ambulated in the mai x 1 with standby assist and walker. Appetite good. Voiding spontaneously; no BM this shift. PICC saline locked.    Temp: 98.3  F (36.8  C) Temp src: Oral BP: 137/82 Pulse: 95   Resp: 16 SpO2: 99 % O2 Device: None (Room air)

## 2022-10-05 NOTE — PROGRESS NOTES
Received message from I Intake department following up with benefit check regarding other self pay quotes from other home infusion agencies. Self pay amount came back significantly less than [facility]. Pt informed of competitors pricing and pt opted to go with [facility] due to price difference. MD and CC notified. Plan to cancel referral for [facility] and care coordinator to contact [facility] for follow up for referral.

## 2022-10-05 NOTE — TELEPHONE ENCOUNTER
VM box is full. EP called 10/5 to let pt know about 10/14 appt with Dr. White.       Claude Luke, RN  [facility] 48 minutes ago (3:52 PM)     MP    Would you look into Dr. Vogt next weeks schedule? Thank you     Message text       Delmer Patel routed conversation to [facility] 1 hour ago (3:04 PM)     Delmer Patel 1 hour ago (3:04 PM)     [facility] Call Center     Phone Message     May a detailed message be left on voicemail: no      Reason for Call: Appointment Intake    Referring Provider Name: Alvaro Braxton     Diagnosis and/or Symptoms: Streptococcal arthritis of right knee     Per Haley, pt needs to be seen by any provider within 1 week for a hospital follow up. Please call pt and advise.         Action Taken: Message routed to:  Other: ID     Travel Screening: Not Applicable

## 2022-10-05 NOTE — PROGRESS NOTES
Care Management Discharge Note    Discharge Date: 10/05/2022     Discharge Disposition: Home with Home Infusion    Discharge Services: Infusion services    Discharge DME: None    Discharge Transportation: family or friend will provide    Private pay costs discussed: insurance costs out of pocket expenses    Education Provided on the Discharge Plan: yes   Persons Notified of Discharge Plans: Pt  Patient/Family in Agreement with the Plan: yes    Handoff Referral Completed: Yes    Additional Information:  Update received during IDT rounds. Informed Pt will be stable for discharge today.     Pt does not have insurance benefits for Home Infusion or for the Infusion Center.   Discharging home with IV abx--Home Infusion would be private pay.     [facility] quoted cost for IV treatment:  Ceftriaxone 2g q24h is $105.63 per day for drug and supplies.  Nursing cost will be $90.00 a visit if [facility] bills for it.     Mya Calderon---RN I Liaison will arrive to hospital ~1300 to complete education.     CM spoke with the Pt to discuss the private pay option for Home Infusion. Pt reports no concern with cost and agreed to plan. Aware of Mya's arrival at 1300 and will assure his S.O is here too for the education.     PLAN: Discharge home with [facility] at [phone]/Fax: [phone] for pt's IV abx needs upon discharge.    Pt will need to receive afternoon IV Ceftriaxone dose prior to discharge.     Addendum 1500    Update received from Mya Calderon. Quote returned from [facility] at a lower cost than [facility]. Pt wishes to proceed with [facility] at $39.00/day.    CM called to place referral. They are able to provide virtual visit today to the pt to education/explain services, deliver medication and supplies with an RN follow up visit on Saturday 10/8.    Requested documents for PICC/Flush orders faxed back to [facility] # [phone]    Pt informed of discharge plan, services and  contact # for [facility].     YVONNE Rico

## 2022-10-05 NOTE — PROGRESS NOTES
[facility]    Met with patient and SO Bee at bedside for teach of IV Ceftriaxone via syringe and IV push.  Plan is for patient to discharge today after next antibiotic dose.    Provided hands-on teaching using teaching mats and demo equipment.  Patient taught SAS method and was able to provide return demonstration using aseptic technique. Extension tubing was added to PICC. IV catheter flushed without resistance and had good blood return.  Delivery of med and supplies will be delivered to patient's home tonight.  A nurse  will contact patient to set up home nursing visit for [facility]LC.    Provided 24/7 phone number and answered all questions.  Patient verbalized understanding of discharge plans.    Thank you for the opportunity to provide infusion services to this patient.    Mya Calderon RN  [facility]  [phone] (Monday through Friday, 8am-5pm)  [phone] (office)

## 2022-10-06 LAB — BACTERIA BLD CULT: NO GROWTH

## 2022-10-06 NOTE — TELEPHONE ENCOUNTER
Nayana Henderson M sent to Claude Luke, RN  Caller: Unspecified (Yesterday,  3:01 PM)  Dr. White only has 10/14 at 8 am so I scheduled him. I tried to call but I couldn't leave a voicemail because the mailbox is full.     Nayana Marcum

## 2022-10-06 NOTE — TELEPHONE ENCOUNTER
Sent My Chart message to patient regarding patients appointment.       Claude Luke RN  Infectious Disease 10:29 AM 10/06/22

## 2022-10-07 LAB — BACTERIA SNV CULT: NO GROWTH

## 2022-10-10 ENCOUNTER — OFFICE VISIT (OUTPATIENT)
Dept: ORTHOPEDICS | Facility: CLINIC | Age: 32
End: 2022-10-10
Attending: EMERGENCY MEDICINE
Payer: COMMERCIAL

## 2022-10-10 VITALS — DIASTOLIC BLOOD PRESSURE: 80 MMHG | RESPIRATION RATE: 18 BRPM | HEART RATE: 96 BPM | SYSTOLIC BLOOD PRESSURE: 110 MMHG

## 2022-10-10 DIAGNOSIS — M25.461 EFFUSION OF RIGHT KNEE JOINT: ICD-10-CM

## 2022-10-10 DIAGNOSIS — M00.9 PYOGENIC ARTHRITIS OF RIGHT KNEE JOINT, DUE TO UNSPECIFIED ORGANISM (H): Primary | ICD-10-CM

## 2022-10-10 LAB
BACTERIA SNV CULT: NORMAL
BACTERIA SNV CULT: NORMAL

## 2022-10-10 PROCEDURE — 99207 PR NO CHARGE LOS: CPT | Performed by: ORTHOPAEDIC SURGERY

## 2022-10-10 NOTE — LETTER
10/10/2022         RE: Sanket Guerrero  [address] [address]        Dear Colleague,    Thank you for referring your patient, Sanket Guerrero, to the [facility]. Please see a copy of my visit note below.    Patient had infection of right knee and left sterno-clavicular joint.  He had 3 I+Ds by [facility] on 9/26, 9/28, 10/2/22 at [facility].  After discharge he made appointment here.  I offered to remove sutures, but appts here would be charged and seeing [facility] is under global bill.  They have decided to follow up with [facility].      Again, thank you for allowing me to participate in the care of your patient.        Sincerely,        Dameon Chatman MD    
IMPROVE-DD Assessment completed: Nov 14 2021  3:28PM

## 2022-10-11 NOTE — PROGRESS NOTES
Patient had infection of right knee and left sterno-clavicular joint.  He had 3 I+Ds by [facility] on 9/26, 9/28, 10/2/22 at [facility].  After discharge he made appointment here.  I offered to remove sutures, but appts here would be charged and seeing [facility] is under global bill.  They have decided to follow up with [facility].

## 2022-10-12 ENCOUNTER — TELEPHONE (OUTPATIENT)
Dept: FAMILY MEDICINE | Facility: CLINIC | Age: 32
End: 2022-10-12

## 2022-10-12 NOTE — TELEPHONE ENCOUNTER
Staff from [facility] called asking for fax number for Dr Braxton for home infusion order for ceftriaxone.Vanna Crawford RN

## 2022-10-13 ENCOUNTER — TELEPHONE (OUTPATIENT)
Dept: INFECTIOUS DISEASES | Facility: CLINIC | Age: 32
End: 2022-10-13

## 2022-10-13 ASSESSMENT — ENCOUNTER SYMPTOMS
HALLUCINATIONS: 0
POLYDIPSIA: 0
CHILLS: 1
WEIGHT GAIN: 0
INCREASED ENERGY: 0
ALTERED TEMPERATURE REGULATION: 1
DECREASED APPETITE: 0
POLYPHAGIA: 0
NIGHT SWEATS: 1
FEVER: 1
FATIGUE: 0
WEIGHT LOSS: 1

## 2022-10-13 NOTE — TELEPHONE ENCOUNTER
Spoke with Giuseppe Pharmacist from [facility]. Patient was discharged on Home Antibiotics. Informed that patient No Showed appt yesterday with Dr. White. Patient verified appt on 10/6 via [employer] when asked for location.     [facility] will contact patient to inform him to connect with clinic to make an appt.       Claude Luke RN  Infectious Disease 2:14 PM 10/13/22

## 2022-10-13 NOTE — TELEPHONE ENCOUNTER
[facility] Call Center    Phone Message    May a detailed message be left on voicemail: yes     Reason for Call: Other: Giuseppe from [facility] care requesting call back to discuss what labs he should be drawing for the pt

## 2022-10-14 ENCOUNTER — TELEPHONE (OUTPATIENT)
Dept: INFECTIOUS DISEASES | Facility: CLINIC | Age: 32
End: 2022-10-14

## 2022-10-14 ENCOUNTER — ANCILLARY PROCEDURE (OUTPATIENT)
Dept: ULTRASOUND IMAGING | Facility: CLINIC | Age: 32
End: 2022-10-14
Attending: REGISTERED NURSE
Payer: COMMERCIAL

## 2022-10-14 ENCOUNTER — OFFICE VISIT (OUTPATIENT)
Dept: INFECTIOUS DISEASES | Facility: CLINIC | Age: 32
End: 2022-10-14
Attending: INTERNAL MEDICINE
Payer: COMMERCIAL

## 2022-10-14 ENCOUNTER — LAB (OUTPATIENT)
Dept: LAB | Facility: CLINIC | Age: 32
End: 2022-10-14
Payer: COMMERCIAL

## 2022-10-14 VITALS
DIASTOLIC BLOOD PRESSURE: 75 MMHG | BODY MASS INDEX: 23.8 KG/M2 | OXYGEN SATURATION: 98 % | SYSTOLIC BLOOD PRESSURE: 113 MMHG | WEIGHT: 175.5 LBS | HEART RATE: 115 BPM

## 2022-10-14 DIAGNOSIS — R50.9 FEVER AND CHILLS: ICD-10-CM

## 2022-10-14 DIAGNOSIS — M00.261 STREPTOCOCCAL ARTHRITIS OF RIGHT KNEE (H): ICD-10-CM

## 2022-10-14 DIAGNOSIS — R79.89 ELEVATED LFTS: ICD-10-CM

## 2022-10-14 DIAGNOSIS — I82.441 ACUTE DEEP VEIN THROMBOSIS (DVT) OF TIBIAL VEIN OF RIGHT LOWER EXTREMITY (H): ICD-10-CM

## 2022-10-14 DIAGNOSIS — Z87.39 HISTORY OF JOINT EFFUSION: Primary | ICD-10-CM

## 2022-10-14 LAB
ALBUMIN SERPL BCG-MCNC: 3.9 G/DL (ref 3.5–5.2)
ALP SERPL-CCNC: 120 U/L (ref 40–129)
ALT SERPL W P-5'-P-CCNC: 119 U/L (ref 10–50)
ANION GAP SERPL CALCULATED.3IONS-SCNC: 13 MMOL/L (ref 7–15)
AST SERPL W P-5'-P-CCNC: 71 U/L (ref 10–50)
BASOPHILS # BLD AUTO: 0 10E3/UL (ref 0–0.2)
BASOPHILS NFR BLD AUTO: 1 %
BILIRUB SERPL-MCNC: 0.4 MG/DL
BUN SERPL-MCNC: 11.6 MG/DL (ref 6–20)
CALCIUM SERPL-MCNC: 9.9 MG/DL (ref 8.6–10)
CHLORIDE SERPL-SCNC: 98 MMOL/L (ref 98–107)
CREAT SERPL-MCNC: 0.82 MG/DL (ref 0.67–1.17)
CRP SERPL-MCNC: 51.4 MG/L
DEPRECATED HCO3 PLAS-SCNC: 26 MMOL/L (ref 22–29)
EOSINOPHIL # BLD AUTO: 0 10E3/UL (ref 0–0.7)
EOSINOPHIL NFR BLD AUTO: 1 %
ERYTHROCYTE [DISTWIDTH] IN BLOOD BY AUTOMATED COUNT: 13.2 % (ref 10–15)
FERRITIN SERPL-MCNC: 1838 NG/ML (ref 31–409)
GFR SERPL CREATININE-BSD FRML MDRD: >90 ML/MIN/1.73M2
GLUCOSE SERPL-MCNC: 99 MG/DL (ref 70–99)
HBV CORE AB SERPL QL IA: NONREACTIVE
HCT VFR BLD AUTO: 32.4 % (ref 40–53)
HGB BLD-MCNC: 10.4 G/DL (ref 13.3–17.7)
HIV 1+2 AB+HIV1 P24 AG SERPL QL IA: NONREACTIVE
IMM GRANULOCYTES # BLD: 0 10E3/UL
IMM GRANULOCYTES NFR BLD: 0 %
LDH SERPL L TO P-CCNC: 297 U/L (ref 0–250)
LYMPHOCYTES # BLD AUTO: 1.4 10E3/UL (ref 0.8–5.3)
LYMPHOCYTES NFR BLD AUTO: 46 %
MCH RBC QN AUTO: 27.4 PG (ref 26.5–33)
MCHC RBC AUTO-ENTMCNC: 32.1 G/DL (ref 31.5–36.5)
MCV RBC AUTO: 86 FL (ref 78–100)
MONOCYTES # BLD AUTO: 0.5 10E3/UL (ref 0–1.3)
MONOCYTES NFR BLD AUTO: 15 %
NEUTROPHILS # BLD AUTO: 1.1 10E3/UL (ref 1.6–8.3)
NEUTROPHILS NFR BLD AUTO: 37 %
NRBC # BLD AUTO: 0 10E3/UL
NRBC BLD AUTO-RTO: 0 /100
PLAT MORPH BLD: NORMAL
PLATELET # BLD AUTO: 339 10E3/UL (ref 150–450)
POTASSIUM SERPL-SCNC: 3.9 MMOL/L (ref 3.4–5.3)
PROT SERPL-MCNC: 9.4 G/DL (ref 6.4–8.3)
RADIOLOGIST FLAGS: ABNORMAL
RBC # BLD AUTO: 3.79 10E6/UL (ref 4.4–5.9)
RBC MORPH BLD: NORMAL
SODIUM SERPL-SCNC: 137 MMOL/L (ref 136–145)
URATE SERPL-MCNC: 4.3 MG/DL (ref 3.4–7)
WBC # BLD AUTO: 3 10E3/UL (ref 4–11)

## 2022-10-14 PROCEDURE — 99207 PR SC NO CHARGE VISIT: CPT | Performed by: INTERNAL MEDICINE

## 2022-10-14 PROCEDURE — 82728 ASSAY OF FERRITIN: CPT | Mod: 90 | Performed by: PATHOLOGY

## 2022-10-14 PROCEDURE — 86140 C-REACTIVE PROTEIN: CPT | Performed by: PATHOLOGY

## 2022-10-14 PROCEDURE — 86780 TREPONEMA PALLIDUM: CPT | Mod: 90 | Performed by: PATHOLOGY

## 2022-10-14 PROCEDURE — 84550 ASSAY OF BLOOD/URIC ACID: CPT | Mod: 90 | Performed by: PATHOLOGY

## 2022-10-14 PROCEDURE — 85025 COMPLETE CBC W/AUTO DIFF WBC: CPT | Performed by: PATHOLOGY

## 2022-10-14 PROCEDURE — 93971 EXTREMITY STUDY: CPT | Mod: RT | Performed by: RADIOLOGY

## 2022-10-14 PROCEDURE — 99000 SPECIMEN HANDLING OFFICE-LAB: CPT | Performed by: PATHOLOGY

## 2022-10-14 PROCEDURE — 80053 COMPREHEN METABOLIC PANEL: CPT | Performed by: PATHOLOGY

## 2022-10-14 PROCEDURE — 87040 BLOOD CULTURE FOR BACTERIA: CPT | Mod: 90 | Performed by: PATHOLOGY

## 2022-10-14 PROCEDURE — 99215 OFFICE O/P EST HI 40 MIN: CPT | Performed by: INTERNAL MEDICINE

## 2022-10-14 PROCEDURE — 86704 HEP B CORE ANTIBODY TOTAL: CPT | Mod: 90 | Performed by: PATHOLOGY

## 2022-10-14 PROCEDURE — 99417 PROLNG OP E/M EACH 15 MIN: CPT | Performed by: INTERNAL MEDICINE

## 2022-10-14 PROCEDURE — 83615 LACTATE (LD) (LDH) ENZYME: CPT | Mod: 90 | Performed by: PATHOLOGY

## 2022-10-14 PROCEDURE — G0463 HOSPITAL OUTPT CLINIC VISIT: HCPCS

## 2022-10-14 PROCEDURE — 87389 HIV-1 AG W/HIV-1&-2 AB AG IA: CPT | Mod: 90 | Performed by: PATHOLOGY

## 2022-10-14 PROCEDURE — 86431 RHEUMATOID FACTOR QUANT: CPT | Mod: 90 | Performed by: PATHOLOGY

## 2022-10-14 PROCEDURE — 36415 COLL VENOUS BLD VENIPUNCTURE: CPT | Performed by: PATHOLOGY

## 2022-10-14 RX ORDER — APIXABAN 5 MG (74)
KIT ORAL
Qty: 70 EACH | Refills: 3 | Status: ON HOLD | OUTPATIENT
Start: 2022-10-14 | End: 2022-11-04

## 2022-10-14 ASSESSMENT — PAIN SCALES - GENERAL: PAINLEVEL: NO PAIN (0)

## 2022-10-14 NOTE — PATIENT INSTRUCTIONS
Please get labs and US today  We will call you after your labs have resulted to determine next steps  We spoke with Dr. Hahn and he said it was okay for you to take ACE off and re-wrap on your own

## 2022-10-14 NOTE — LETTER
10/14/2022       RE: Sanket Guerrero  [address] [facility] [address]     Dear Colleague,    Thank you for referring your patient, Sanket Guerrero, to the [facility] INFECTIOUS DISEASE CLINIC [place] at [facility]. Please see a copy of my visit note below.    [facility]  Transplant Infectious Disease Clinic Note:  New Patient     Patient:  Sanket Guerrero, Date of birth 1990, Medical record number [id number]  Date of Visit:  10/13/2022  Consult requested by Dr. Braxton for evaluation of spontaneous septic arthritis of native right knee and left sternoclavicular joint.         Assessment and Recommendations:   Recommendations:  1. Labs today: CBC w/diff, CMP, CRP, Lactate dehydrogenase, ferritin, Hep B core antibody, blood cultures (two sets, one set from PICC), uric acid, treponema abs w/reflex to RPR.  2. Duplex ultrasound of right knee for DVT workup  3. Referral to Rheumatology for further workup  4. Continue following closely with Dr. Hahn for further therapeutic aspirations  5. Will communicate closely with Dr. Hahn, Orthopedic Surgery @ [facility]  6. Start Eliquis 10mg PO twice daily for 7 days, and then 5mg twice daily for 3 months, duration will be further determined by PCP; stopped taking aspirin while on Eliquis, use ibuprofen sparingly and at the lowest dose.  7. Okay to take acetaminophen at the dose you are currently been using it at (1,300mg); rechecking LFTs (10/17).    8. Repeat labs 10/17 (CBC, CMP, CRP)  9. Establish with primary care provider in 1-2 weeks for on-going management of DVT   10. Follow up 1-2 weeks in-person or virtual with me          Assessment:    Sanket Guerrero is a 32 year old male with a PMH significant for gout who presents today following a recent hospitalization (9/25/22-10/5/22) for septic arthritis (Strep agalactiae) of his native right knee and left sternoclavicular joint  septic arthritis s/p multiple washouts and c/b transaminitis.  Patient well-appearing today despite ongoing fevers, chills, rigors and reported he is doing well with the daily IV ceftriaxone infusions.  He denies any additional symptoms PICC line and on assessment there is no erythema or drainage of the site.  On exam today his left sternoclavicular incision is well-healed without any apparent signs of ongoing infection or edema.  I was unable to assess his right knee as it was wrapped in a compressive Ace bandage from just superior to the knee down to his right ankle.  Spoke with Dr. Hahn today at [facility], who saw the patient yesterday in clinic for further aspiration of his ongoing right knee effusion.  He states that aspirate was bloody and did not appear infectious or purulent yesterday.  He is also concerned about the ongoing systemic symptoms the patient has been experiencing throughout his hospitalization, which have continued since his discharge.  Concern for underlying rheumatologic condition that could increase the patient's risk for spontaneous septic arthritis was considered by Dr. Hahn and ourselves, so ordered basic rheumatologic labs today as well as a referral for rheumatology consult.      Patient also has experienced intermittent right calf cramping, in the setting of thrombocytosis and immobility, we were concerned for possible DVT. Ultrasound right lower extremity 10/14/2022 positive for 2 acute occlusive clots present in the paired posterior tibial veins proximal calf to ankle and then in the other posterior tibial vein in the mid calf. Confirmed with Dr. Hahn that patient is ok to start on Eliquis today, and he confirmed that this was fine to start. Placed order for Eliquis course for treatment of a DVT and a priority PCP referral for further DVT management, as the patient has yet to establish with anyone.  Instructed to stop oral aspirin anticoagulation therapy  "prescribed at discharge, and to be mindful of how much ibuprofen he takes each day while he is on Eliquis.    Labs today show improvement of CRP down to 51.4, improvement of LFTs though is still elevated alkaline phosphatase now within normal limits. Recommended the patient also be mindful of how much Tylenol he takes while his liver enzymes are elevated. Of note the patient is now leukopenic with a white blood cell count of 3.0; unsure if this is related to IV ceftriaxone.  We will repeat labs on Monday (10/17), and if the white blood cell count continues to trend down will consider switching the patient to a different class of medication.  Considering oral amoxicillin to finish treatment for septic arthritis of the native joint. Other labs still pending include: Repeat blood cultures, treponema ABS with reflex to RPR and titer, HIV antigen antibody combo, uric acid, rheumatoid factor, ferritin, lactate dehydrogenase, hepatitis B core antibody.        Dr. Munoz and myself spoke with Dr. Hahn today for a total of 25 minutes over the phone.    I spent 120 minutes as part of a shared visit on the date of the encounter doing chart review, history and exam, documentation.      RESHMA Freitas, CNP  Infectious Diseases  Pager# [phone]           Interval History:   Sanket Guerrero is a 32 year old male with a PMH significant for gout who presents today following a recent hospitalization (9/25/22-10/5/22) for septic arthritis (Strep agalactiae) of his native right knee and left sternoclavicular joint septic arthritis s/p multiple washouts and c/b transaminitis.     Today 10/14/2022, still having nightsweats and fevers (101 at max) at night. Ok all day but the nighttime is the worst. And shaking chills for the last two days. Feels better during the day when taking ceftriaxone. Intermittent right calf soreness \"leg cramp\", that goes away on its own but returns. Good appetite, but has lost about 15lbs since " hospitalization. Denies nausea, vomiting, abdominal pain, diarrhea, dysuria, acute rashes, arthralgia, myalgias.    Seen by Dr. Hahn (10/13) who removed the stitches, performed another aspiration of the right knee. Removed 80ccs of fluid and blood. Spoke with Dr. Hahn today (10/14), who noted that initial right knee aspirate (did not look purulent, but more inflammatory (9/25). He also is concerned about the possibility of a rheumatological etiology for this patient's long history of monoarticular joint effusions acting as a potential risk factor for his recent episode of septic arthritis and ongoing fevers and nightsweats, significantly elevated acute phase reactants, despite appropriate antibiotic therapy and source control.        History of the Infectious Disease lllness:     Thought initially he had food poisoning, ate some leftover food that he kept in his car on a hot day, began to develop symptoms rapidly throughout that day (9/21). That evening developed rigors, chills, fever, nightsweats, vomiting (9/21), then developed pain initially in his left shoulder near the left collar bone (9/22), right knee pain developed (9/23).     Presented to [facility] with several days of right knee and left-sided chest wall pain and swelling with associated fever (101.3), and recent nausea and vomiting. Blood cultures (9/25), joint aspirate cell count (>300,000 cells), crystal analysis (negative), and cultures were drawn. Started on IV vancomycin and cefepime (9/26). Right knee synovial aspirate was positive with 1+ Strep agalactiae (pan-sensitive) on Day 2 of incubation. Repeat synovial fluid culture (9/26) finalized with no growth to date. Curbside consult (9/27) with Dr. Guthrie, who provided further instructions on infectious workup and antibiotic narrowing. Repeat right knee aspirate (10/2) sent for gram stain, aerobic, and anaerobic culture, with Gram stain negative and  "repeat cultures finalized with no growth to date after 11 days. Left sternoclavicular aspirate culture grew 1+ Strep agalactiae collected (9/26) and positive on Day 1 of incubation. Daily blood cultures (9/25 - 10/1) finalized with no growth to date. Other negative infectious workup includes: urinalysis, Chlamydia trachomatis/Neisseria gonorrheae PCR, SARS-CoV2 PCR, Influenza A and B PCR, MRSA nares, lyme DNA PCR, Hep B surface Agn, Hep A IgM antibody, Hep C antibody. Hep B surface antibody reactive with >1,000.00.    Right knee s/p I&D (9/26, 9/28, 10/2)  Left Sternoclavicular joint I&D (9/28, 10/2)    Throughout the patient's hospitalization he continued to have daily fevers with a Tmax of 102.9 on 10/1. CRP significantly elevated (309.97) on admission and trended down to 206.4 on discharge (10/5); leukocytosis of 12.5 on admission but resolving (10/4); ESR elevated on admissoion. Developed transaminitis with elevated alk phos and LFTs (9/27), which continued to rise and began trending down after 10/4.      CT  right knee with contrast on 9/25/22 showed Massive knee joint effusion. Mild osteoarthrosis of the patellofemoral joint.  CT sternum SC joints (9/27/22) showed moderate effusion left sternoclavicular joint w/overlying soft tissue stranding. No abscess or osteomyelitis  Remaining scans are negative   TTE: negative signs of endocarditis, no valvular vegetations, pericardial effusion. Normal EF      History of monoarticular \"flare ups\" with swelling and pain that will last several days, then resolve on their own. Has had this for about 12 years. Denies any heat to the joint or erythema during these flare ups.      Childhood healthy, played lots of contact sports but denies any major injuries requiring surgery or joint injections.    Grandfather history of gout, DMII. Father hypercholesterolemia. Mom otherwise healthy.    HOME/ENVIRONMENT: house, no concerns for pests or mold    OCCUPATION: Information " technology    TRAVEL: born in [place], traveled to [place] in august. No other recent travel outside [place]    OUTDOOR ACTIVITIES:   - Camping: no, but enjoys fishing (June)  - Cave exploring: no   - Walking barefoot on soil or grass: no  - Swimming or wading in rivers, lakes or streams: no this year  - Hot tubs or Jacuzzis: no    ANIMALS: one dog, no farm animal exposure    FOOD/WATER:   - Raw undercooked meat or sushi: eats sushi once a month  - Unwashed fruits or vegetables: yes  - Unpasteurized milk: no  - Well water: no    DENTAL WORK: no    TUBERCULOSIS:   - Known TB exposures: no  - Prior TB testing results: no  - Healthcare work: no : no  - Homelessness: no  - skilled nursing: no    TOBACCO/ALCOHOL/RECREATIONAL DRUGS: occasional alcoholic drink    SEXUAL ACTIVITY: yes, one-partner    Review of systems is otherwise negative. Pertinent positives and negatives listed above.      No past medical history on file.    Past Surgical History:   Procedure Laterality Date     ARTHROSCOPY KNEE INCISION AND DRAINAGE Bilateral 9/26/2022    Procedure: Right knee arthroscopic irrigation and debridement, partial synovectomy and left sternoclavicular joint needle aspiration;  Surgeon: Jovani Hahn MD;  Location: [facility]     ARTHROSCOPY KNEE IRRIGATION AND DEBRIDEMENT Right 9/28/2022    Procedure: Right IRRIGATION AND DEBRIDEMENT, KNEE, ARTHROSCOPIC;  Surgeon: Jovani Hahn MD;  Location: [facility]     ARTHROSCOPY KNEE IRRIGATION AND DEBRIDEMENT Right 10/2/2022    Procedure: IRRIGATION AND DEBRIDEMENT, KNEE, ARTHROSCOPIC RIGHT;  Surgeon: Zeke Vazquez MD;  Location: [facility]     IRRIGATION AND DEBRIDEMENT NECK, COMBINED Left 9/28/2022    Procedure: Open IRRIGATION AND DEBRIDEMENT of Sternal Clavicular Joint Left;  Surgeon: Jovani Hahn MD;  Location: [facility]     IRRIGATION AND DEBRIDEMENT SHOULDER, COMBINED Left 10/2/2022    Procedure: Left Sternoclavicular Joint Irrigation And Debridement;  Surgeon: Zeke Vazquez  MD Atilio;  Location: [facility]       No family history on file.    Social History     Social History Narrative     Not on file          Immunization History   Administered Date(s) Administered     COVID-19,PF,Moderna 04/14/2021, 05/12/2021       Patient Active Problem List   Diagnosis     Left-sided chest wall pain     Effusion of right knee joint     Febrile illness     Septic arthritis of knee, right (H)       No outpatient medications have been marked as taking for the 10/14/22 encounter (Appointment) with  [facility].       No Known Allergies           Physical Exam:   Vitals were reviewed.  All vitals stable  /75   Pulse 115   Wt 79.6 kg (175 lb 8 oz)   SpO2 98%   BMI 23.80 kg/m    Wt Readings from Last 4 Encounters:   09/26/22 86.9 kg (191 lb 9.3 oz)       Exam:  GENERAL: well-developed, well-nourished, alert, oriented, in no acute distress.  HEAD: Head is normocephalic, atraumatic   EYES: Eyes have anicteric sclerae.    ENT: Oropharynx is moist without exudates or ulcers.  NECK: Supple.   LUNGS: Clear to auscultation, no wheezes or crackles  CARDIOVASCULAR: Regular rate and rhythm with no murmurs, gallops or rubs.  SKIN: left sternoclavicular I&D site well-healed, without dehiscence, erythema, drainage, or tenderness. Right knee not assessed as it was seen by Dr. Hahn on 10/13 for respiration and wrapped with compressive dressing to help decrease effusion. Spoke with Dr. Hahn on the phone 10/14/2022 and he states the fluid on aspiration was bloody and serous, and did not look infectious. No acute rashes.   NEUROLOGIC: Grossly nonfocal         Laboratory Data:     Inflammatory Markers    Recent Labs   Lab Test 10/14/22  1137 10/05/22  0508 10/04/22  0541 10/02/22  0516 10/01/22  0614 09/30/22  0601 09/28/22  0454 09/27/22  0454 09/25/22  1854   SED  --  124*  --   --   --   --   --   --  73*   CRP 51.40*  --  206.40* 217.99* 222.72* 213.18* 216.87* 269.53* 309.97*       Metabolic Studies    Recent  Labs   Lab Test 10/14/22  1137 10/05/22  0508 10/04/22  0541 10/01/22  1522 09/26/22  0120 09/25/22  1854    136 136  --    < > 135*   POTASSIUM 3.9 4.1 4.3  --    < > 3.9   CHLORIDE 98 96* 98  --    < > 99   CO2 26 27 25  --    < > 24   ANIONGAP 13 13 13  --    < > 12   BUN 11.6 13.1 11.6  --    < > 10.7   CR 0.82 0.75 0.72  --    < > 0.84   GFRESTIMATED >90 >90 >90  --    < > >90   GLC 99 116* 137*  --    < > 113*   JOCELYNE 9.9 9.2 9.1  --    < > 9.3   URIC  --   --   --   --   --  3.9   LACT  --   --   --  1.2   < >  --    CKT  --   --  50  --   --   --     < > = values in this interval not displayed.       Hepatic Studies    Recent Labs   Lab Test 10/14/22  1137 10/05/22  0508 10/04/22  1730   BILITOTAL 0.4 0.4 0.4   DBIL  --   --  <0.20   ALKPHOS 120 237* 263*   PROTTOTAL 9.4* 8.2 7.9   ALBUMIN 3.9 3.2* 3.1*   AST 71* 91* 146*   * 241* 284*       Gout Labs      Recent Labs   Lab Test 09/25/22  1854   URIC 3.9       Hematology Studies   Recent Labs   Lab Test 10/14/22  1127 10/05/22  0508 10/04/22  0541 10/02/22  0516   WBC 3.0* 10.4 10.5 12.3*   ANEUTAUTO 1.1*  --  7.6  --    ALYMPAUTO 1.4  --  1.5  --    AMONOAUTO 0.5  --  1.2  --    AEOSAUTO 0.0  --  0.1  --    ABSBASO 0.0  --  0.1  --    HGB 10.4* 9.1* 9.2* 9.9*   HCT 32.4* 27.5* 27.4* 29.6*    793* 740* 676*       Clotting Studies    Recent Labs   Lab Test 10/04/22  1730   INR 1.14       Iron Testing    Recent Labs   Lab Test 10/14/22  1127 10/05/22  0508 10/04/22  1730   IRON  --   --  12*   FEB  --   --  184*   IRONSAT  --   --  7*   RUBY  --   --  1,328*   MCV 86   < >  --     < > = values in this interval not displayed.         Urine Studies     Recent Labs   Lab Test 10/04/22  1955   URINEPH 7.0   NITRITE Negative   LEUKEST Negative       Medication levels    Recent Labs   Lab Test 09/28/22  2141   VANCOMYCIN <4.0           Microbiology:     Last Culture results   Culture   Date Value Ref Range Status   10/02/2022 No anaerobic organisms  isolated after 11 days  Preliminary   10/02/2022 No Growth  Final   10/01/2022 No Growth  Final   09/30/2022 No Growth  Final   09/30/2022 No Growth  Final   09/29/2022 No Growth  Final   09/28/2022 No Growth  Final   09/27/2022 No Growth  Final   09/26/2022 No anaerobic organisms isolated  Final   09/26/2022 No Growth  Final   09/26/2022 No anaerobic organisms isolated  Final   09/26/2022 (A)  Final    1+ Streptococcus agalactiae (Group B Streptococcus)     Comment:     Susceptibilities done on previous cultures   09/26/2022 No Growth  Final   09/25/2022 (A)  Final    1+ Streptococcus agalactiae (Group B Streptococcus)   09/25/2022 No Growth  Final           Syphilis Testing  Invalid input(s): [id number]      Virology:  Coronavirus-19 testing    Recent Labs   Lab Test 09/25/22  1543   [id number] Negative           Hepatitis B Testing     Recent Labs   Lab Test 10/04/22  1730   AUSAB >1,000.00   HEPBANG Nonreactive     Was the last Hepatitis B E antigen positive?   No results found for: HBEAGN     Hepatitis C Antibody   Date Value Ref Range Status   10/04/2022 Nonreactive Nonreactive Final           Imaging:  Results for orders placed or performed during the hospital encounter of 09/25/22   XR Chest 2 Views    Narrative    EXAM: XR CHEST 2 VIEWS  LOCATION: [facility]  DATE/TIME: 9/25/2022 4:18 PM    INDICATION: Left chest and shoulder pain  COMPARISON: None.      Impression    IMPRESSION: Cardiomediastinal silhouette is normal. Normal vasculature. Lungs and pleural spaces are clear. No fracture evident.   XR Knee Right 3 Views    Narrative    EXAM: XR KNEE RIGHT 3 VIEWS  LOCATION: [facility]  DATE/TIME: 9/25/2022 4:16 PM    INDICATION: pain  COMPARISON: None.      Impression    IMPRESSION: Normal joint spaces and alignment. No fracture. Moderate joint effusion.   CT Knee Right w Contrast    Narrative    EXAM: CT KNEE RIGHT W CONTRAST  LOCATION: [facility]  DATE/TIME: 9/25/2022 8:46 PM    INDICATION: Right distal femur anterior swelling and knee swelling. Rule out abscess or other abnormality.  COMPARISON: Radiographs from 9/25/2022.  TECHNIQUE: IV contrast. Axial, sagittal and coronal thin-section reconstruction. Dose reduction techniques were used.   CONTRAST: 100 mL Isovue 370.    FINDINGS:     BONES AND JOINTS:  -There is a massive knee joint effusion. No calcified intra-articular body. No fracture. Mild osteoarthrosis of the patellofemoral joint.    SOFT TISSUES:  -No hematoma, cyst or mass. No gross muscle or tendon pathology. No evidence of abscess.      Impression    IMPRESSION:  1.  Massive knee joint effusion.  2.  Mild osteoarthrosis of the patellofemoral joint.  3.  Otherwise negative.     CT Sternum SC Joints without Contrast    Narrative    EXAM: CT STERNUM SC JOINTS WITHOUT CONTRAST  LOCATION: [facility]  DATE/TIME: 9/27/2022 6:28 PM    INDICATION: Chest pain. Left sternoclavicular joint effusion tapped and positive for Group B Strep, evaluate for extent of septic effusion inflammation.  COMPARISON: None.  TECHNIQUE: With IV contrast. Axial, sagittal and coronal thin-section reconstruction. Dose reduction techniques were used.   CONTRAST: 85 mL Isovue-300 were administered.    FINDINGS:     BONES:  -No fracture. No definite evidence of osteomyelitis. Small accessory ossicle at the cephalad margin of the manubrium. Normal alignment.     SOFT TISSUES:  -Moderate-sized effusion within the left sternoclavicular joint with mild overlying soft tissue swelling and localized soft tissue stranding consistent with the patient's history of septic arthritis. No adenopathy. No discrete fluid collection to suggest   an abscess. Visualized portions of both upper lungs normal. Mediastinum unremarkable. Normal appearing right sternoclavicular joint.      Impression    IMPRESSION:  1.  No evidence of an abscess or  osteomyelitis.    2.  Moderate-sized effusion within the left sternoclavicular joint with overlying soft tissue stranding compatible with the history of septic arthritis.     CT Soft tissue neck w contrast*    Narrative    EXAM: CT SOFT TISSUE NECK W CONTRAST  LOCATION: [facility]  DATE/TIME: 10/1/2022 4:25 PM    INDICATION: Recurrent fevers. Polyarticular infectious/inflammatory process, eval for additional abscess or infection  COMPARISON: None.  CONTRAST: 50 mL Isovue 370  TECHNIQUE: Routine CT Soft Tissue Neck with IV contrast. Multiplanar reformats. Dose reduction techniques were used.    FINDINGS:     MUCOSAL SPACES/SOFT TISSUES: Normal mucosal spaces of the upper aerodigestive tract. No mucosal mass or inflammation identified. Normal vocal cords and infraglottic trachea. Normal parapharyngeal space and  spaces. Normal oral cavity,    spaces, and floor of mouth structures.    LYMPH NODES: Shotty bilateral cervical lymph nodes, none frankly pathologic by size or morphology criteria.     SALIVARY GLANDS: Normal parotid and submandibular glands.    THYROID: Normal.     VESSELS: Vascular structures of the neck are patent.    VISUALIZED INTRACRANIAL/ORBITS/SINUSES: No abnormality of the visualized intracranial compartment or orbits. Visualized paranasal sinuses and mastoid air cells are clear.    OTHER: Left sternoclavicular joint effusion without aggressive/erosive osseous changes. Overlying cutaneous fat stranding and small locules of subcutaneous gas consistent with surgical drainage. Otherwise unremarkable visualized osseous structures. The   included lung apices are clear.      Impression    IMPRESSION:   1.  Persistent left sternoclavicular joint effusion as seen on dedicated CT from 09/27/2022. Changes of interval surgical drainage without evidence for aggressive osseous erosion/destruction.  2.  No additional evidence for an infectious/inflammatory process in  the neck.  3.  Nonspecific shotty cervical lymph nodes are presumably reactive in nature.   CT Chest/Abdomen/Pelvis w Contrast    Narrative    EXAM: CT CHEST/ABDOMEN/PELVIS W CONTRAST  LOCATION: [facility]  DATE/TIME: 10/1/2022 4:35 PM    INDICATION: Fever, eval for abscess or infection.  COMPARISON: None.  TECHNIQUE: CT scan of the chest, abdomen, and pelvis was performed following injection of IV contrast. Multiplanar reformats were obtained. Dose reduction techniques were used.   CONTRAST: 50 mL Isovue 370.    FINDINGS:   LUNGS AND PLEURA: Normal.    MEDIASTINUM/AXILLAE: Normal.    CORONARY ARTERY CALCIFICATION: None.    HEPATOBILIARY: Normal.    PANCREAS: Normal.    SPLEEN: Normal.    ADRENAL GLANDS: Normal.    KIDNEYS/BLADDER: Normal.    BOWEL: Normal.    LYMPH NODES: Normal.    VASCULATURE: Unremarkable.    PELVIC ORGANS: Normal.    MUSCULOSKELETAL: Soft tissue swelling overlying the left sternoclavicular joint with a bubble of air present superficially. This could be due to infection. There is no evidence for osteomyelitis.      Impression    IMPRESSION:  1.  Soft tissue swelling left sternoclavicular joint with some soft tissue air present suspicious for infection. No CT evidence for osteomyelitis.   CT Head w contrast    Narrative    EXAM: CT HEAD W CONTRAST  LOCATION: [facility]  DATE/TIME: 10/1/2022 4:44 PM    INDICATION: Recurrent fevers. Concern for polyarticular septic arthritis. Eval for abscess or infection  COMPARISON: None.  CONTRAST: 25 mL Isovue 370  TECHNIQUE: Routine CT Head with IV contrast.  Dose reduction techniques were used.    FINDINGS:  INTRACRANIAL CONTENTS: No intracranial hemorrhage, extraaxial collection, or mass effect.  No CT evidence of acute infarct. Normal parenchymal attenuation. Normal ventricles and sulci. No pathologic enhancement identified.    VISUALIZED ORBITS/SINUSES/MASTOIDS: No intraorbital abnormality. No  paranasal sinus mucosal disease. No middle ear or mastoid effusion.    BONES/SOFT TISSUES: No acute abnormality.      Impression    IMPRESSION:  1.  No evidence for acute intracranial process.   US Lower Extremity Venous Duplex Bilateral    Narrative    EXAM: US LOWER EXTREMITY VENOUS DUPLEX BILATERAL  LOCATION: [facility]  DATE/TIME: 10/1/2022 5:19 PM    INDICATION: Pain and swelling.  COMPARISON: None.  TECHNIQUE: Venous Duplex ultrasound of bilateral lower extremities with and without compression, augmentation and duplex. Color flow and spectral Doppler with waveform analysis performed.    FINDINGS: Exam includes the common femoral, femoral, popliteal veins as well as segmentally visualized deep calf veins and greater saphenous vein.     RIGHT: No deep vein thrombosis. No superficial thrombophlebitis. No popliteal cyst.    LEFT: No deep vein thrombosis. No superficial thrombophlebitis. No popliteal cyst.      Impression    IMPRESSION:  1.  No deep venous thrombosis in the bilateral lower extremities.   XR Chest Port 1 View    Narrative    XR CHEST PORT 1 VIEW   10/4/2022 1:59 PM     HISTORY: RN placed PICC - verify tip placement    COMPARISON: CT 10/1/2022.      Impression    IMPRESSION: Placement of right upper extremity PICC, tip overlying the  superior vena cava. Normal cardiomediastinal silhouette. Lungs are  clear. No acute bony abnormality.    HELIO ACOSTA MD         SYSTEM ID:  [id number]   US Abdomen Limited    Narrative    EXAM: US ABDOMEN LIMITED  LOCATION: [facility]  DATE/TIME: 10/4/2022 7:23 PM    INDICATION: Increasing liver enzymes. Mild right upper quadrant tenderness.  COMPARISON: CT abdomen pelvis 10/01/2022.  TECHNIQUE: Limited abdominal ultrasound.    FINDINGS:    GALLBLADDER: Normal. No gallstones, wall thickening, or pericholecystic fluid. Negative sonographic Ramos's sign.    BILE DUCTS: No biliary dilatation. The common duct  measures 4 mm.    LIVER: Normal parenchyma with smooth contour. No focal mass.    RIGHT KIDNEY: No hydronephrosis.    PANCREAS: The visualized portions are normal.    No ascites.      Impression    IMPRESSION:    1.  Normal limited abdominal ultrasound.   Echocardiogram Complete     Value    LVEF  60-65%    Narrative    [id number]  [id number]  [id number]  [id number]^AGNESLA^MELANY^MADHU     [facility]  Echocardiography Laboratory  [address]  [place] [address]     Name: SEDRICK TONY  MRN: [id number]  : 1990  Study Date: 2022 10:59 AM  Age: 32 yrs  Gender: Male  Patient Location: [facility]  Reason For Study: Endocarditis  Ordering Physician: MELANY MALDONADO  Performed By: Mary Russell RDCS     BSA: 2.1 m2  Height: 72 in  Weight: 191 lb  HR: 99  BP: 119/64 mmHg  ______________________________________________________________________________  Procedure  Complete Portable Echo Adult.  ______________________________________________________________________________  Interpretation Summary     Left ventricular systolic function is normal.  The visual ejection fraction is 60-65%.  The right ventricle is normal in structure, function and size.  Valve structures without significant dysfunction. No apparent valvular  vegetations.  The inferior vena cava was normal in size with preserved respiratory  variability.  There is no pericardial effusion.     No prior study for comparison. Consider LINDA if clnically indicated to assess  for endocarditis.  ______________________________________________________________________________  Left Ventricle  The left ventricle is normal in structure, function and size. Left ventricular  systolic function is normal. The visual ejection fraction is 60-65%. Normal  left ventricular wall motion.     Right Ventricle  The right ventricle is normal in structure, function and size.     Atria  Normal left atrial size. Right atrial size is normal.     Mitral Valve  The mitral  valve is normal in structure and function.     Tricuspid Valve  The tricuspid valve is normal in structure and function. Right ventricular  systolic pressure could not be approximated due to inadequate tricuspid  regurgitation.     Aortic Valve  The aortic valve is normal in structure and function.     Pulmonic Valve  The pulmonic valve is normal in structure and function.     Vessels  The aortic root is normal size. Normal size ascending aorta. The inferior vena  cava was normal in size with preserved respiratory variability.     Pericardium  There is no pericardial effusion.     ______________________________________________________________________________  MMode/2D Measurements & Calculations  IVSd: 1.1 cm  LVIDd: 4.7 cm  LVIDs: 2.8 cm  LVPWd: 0.97 cm  FS: 39.5 %     LV mass(C)d: 169.1 grams  LV mass(C)dI: 80.9 grams/m2  Ao root diam: 3.4 cm  LA dimension: 2.9 cm  asc Aorta Diam: 2.9 cm  LA/Ao: 0.84  RWT: 0.41     Doppler Measurements & Calculations  MV E max jerri: 84.9 cm/sec  MV A max jerri: 96.1 cm/sec  MV E/A: 0.88  MV dec time: 0.13 sec  PA acc time: 0.12 sec  E/E' av.5  Lateral E/e': 8.5  Medial E/e': 8.6     ______________________________________________________________________________  Report approved by: Kwaku Mota 2022 02:08 PM               Duplex Ultrasound Right Lower Extremity 10/14/2022    Impression:  1. Acute occlusive DVT of one of the paired posterior tibial veins  from the proximal calf to ankle.     2. The other posterior tibial vein with acute DVT in the mid calf.      TTE 22  Interpretation Summary     Left ventricular systolic function is normal.  The visual ejection fraction is 60-65%.  The right ventricle is normal in structure, function and size.  Valve structures without significant dysfunction. No apparent valvular  vegetations.  The inferior vena cava was normal in size with preserved respiratory  variability.  There is no pericardial effusion.     No prior  study for comparison. Consider LINDA if clnically indicated to assess  for endocarditis.  ______________________________________________________________________________  Left Ventricle  The left ventricle is normal in structure, function and size. Left ventricular  systolic function is normal. The visual ejection fraction is 60-65%. Normal  left ventricular wall motion.     Right Ventricle  The right ventricle is normal in structure, function and size.     Atria  Normal left atrial size. Right atrial size is normal.     Mitral Valve  The mitral valve is normal in structure and function.     Tricuspid Valve  The tricuspid valve is normal in structure and function. Right ventricular  systolic pressure could not be approximated due to inadequate tricuspid  regurgitation.     Aortic Valve  The aortic valve is normal in structure and function.     Pulmonic Valve  The pulmonic valve is normal in structure and function.     Vessels  The aortic root is normal size. Normal size ascending aorta. The inferior vena  cava was normal in size with preserved respiratory variability.     Pericardium  There is no pericardial effusion.      Again, thank you for allowing me to participate in the care of your patient.      Sincerely,    No name on file

## 2022-10-14 NOTE — TELEPHONE ENCOUNTER
[facility] Call Center    Phone Message    May a detailed message be left on voicemail: no     Reason for Call: Other: Radiology calling to give information about pt infection and stated to give a call back with any questions. Thank you    Action Taken: Message routed to:  Other: ID    Travel Screening: Not Applicable

## 2022-10-14 NOTE — PROGRESS NOTES
[facility]  Transplant Infectious Disease Clinic Note:  New Patient     Patient:  Sanket Guerrero, Date of birth 1990, Medical record number [id number]  Date of Visit:  10/13/2022  Consult requested by Dr. Braxton for evaluation of spontaneous septic arthritis of native right knee and left sternoclavicular joint.         Assessment and Recommendations:   Recommendations:  1. Labs today: CBC w/diff, CMP, CRP, Lactate dehydrogenase, ferritin, Hep B core antibody, blood cultures (two sets, one set from PICC), uric acid, treponema abs w/reflex to RPR.  2. Duplex ultrasound of right knee for DVT workup  3. Referral to Rheumatology for further workup  4. Continue following closely with Dr. Hahn for further therapeutic aspirations  5. Will communicate closely with Dr. Hahn, Orthopedic Surgery @ [facility]  6. Start Eliquis 10mg PO twice daily for 7 days, and then 5mg twice daily for 3 months, duration will be further determined by PCP; stopped taking aspirin while on Eliquis, use ibuprofen sparingly and at the lowest dose.  7. Okay to take acetaminophen at the dose you are currently been using it at (1,300mg); rechecking LFTs (10/17).    8. Repeat labs 10/17 (CBC, CMP, CRP)  9. Establish with primary care provider in 1-2 weeks for on-going management of DVT   10. Follow up 1-2 weeks in-person or virtual with me          Assessment:    Sanket Guerrero is a 32 year old male with a PMH significant for gout who presents today following a recent hospitalization (9/25/22-10/5/22) for septic arthritis (Strep agalactiae) of his native right knee and left sternoclavicular joint septic arthritis s/p multiple washouts and c/b transaminitis.  Patient well-appearing today despite ongoing fevers, chills, rigors and reported he is doing well with the daily IV ceftriaxone infusions.  He denies any additional symptoms PICC line and on assessment there is no erythema or drainage of the site.  On  exam today his left sternoclavicular incision is well-healed without any apparent signs of ongoing infection or edema.  I was unable to assess his right knee as it was wrapped in a compressive Ace bandage from just superior to the knee down to his right ankle.  Spoke with Dr. Hahn today at [facility] orth[facility], who saw the patient yesterday in clinic for further aspiration of his ongoing right knee effusion.  He states that aspirate was bloody and did not appear infectious or purulent yesterday.  He is also concerned about the ongoing systemic symptoms the patient has been experiencing throughout his hospitalization, which have continued since his discharge.  Concern for underlying rheumatologic condition that could increase the patient's risk for spontaneous septic arthritis was considered by Dr. Hahn and ourselves, so ordered basic rheumatologic labs today as well as a referral for rheumatology consult.      Patient also has experienced intermittent right calf cramping, in the setting of thrombocytosis and immobility, we were concerned for possible DVT. Ultrasound right lower extremity 10/14/2022 positive for 2 acute occlusive clots present in the paired posterior tibial veins proximal calf to ankle and then in the other posterior tibial vein in the mid calf. Confirmed with Dr. Hahn that patient is ok to start on Eliquis today, and he confirmed that this was fine to start. Placed order for Eliquis course for treatment of a DVT and a priority PCP referral for further DVT management, as the patient has yet to establish with anyone.  Instructed to stop oral aspirin anticoagulation therapy prescribed at discharge, and to be mindful of how much ibuprofen he takes each day while he is on Eliquis.    Labs today show improvement of CRP down to 51.4, improvement of LFTs though is still elevated alkaline phosphatase now within normal limits. Recommended the patient also be mindful of how much Tylenol he takes  "while his liver enzymes are elevated. Of note the patient is now leukopenic with a white blood cell count of 3.0; unsure if this is related to IV ceftriaxone.  We will repeat labs on Monday (10/17), and if the white blood cell count continues to trend down will consider switching the patient to a different class of medication.  Considering oral amoxicillin to finish treatment for septic arthritis of the native joint. Other labs still pending include: Repeat blood cultures, treponema ABS with reflex to RPR and titer, HIV antigen antibody combo, uric acid, rheumatoid factor, ferritin, lactate dehydrogenase, hepatitis B core antibody.        Dr. Munoz and myself spoke with Dr. Hahn today for a total of 25 minutes over the phone.    I spent 120 minutes as part of a shared visit on the date of the encounter doing chart review, history and exam, documentation.      RESHMA Freitas, CNP  Infectious Diseases  Pager# [phone]           Interval History:   Sanket Guerrero is a 32 year old male with a PMH significant for gout who presents today following a recent hospitalization (9/25/22-10/5/22) for septic arthritis (Strep agalactiae) of his native right knee and left sternoclavicular joint septic arthritis s/p multiple washouts and c/b transaminitis.     Today 10/14/2022, still having nightsweats and fevers (101 at max) at night. Ok all day but the nighttime is the worst. And shaking chills for the last two days. Feels better during the day when taking ceftriaxone. Intermittent right calf soreness \"leg cramp\", that goes away on its own but returns. Good appetite, but has lost about 15lbs since hospitalization. Denies nausea, vomiting, abdominal pain, diarrhea, dysuria, acute rashes, arthralgia, myalgias.    Seen by Dr. Hahn (10/13) who removed the stitches, performed another aspiration of the right knee. Removed 80ccs of fluid and blood. Spoke with Dr. Hahn today (10/14), who noted that initial right knee aspirate " (did not look purulent, but more inflammatory (9/25). He also is concerned about the possibility of a rheumatological etiology for this patient's long history of monoarticular joint effusions acting as a potential risk factor for his recent episode of septic arthritis and ongoing fevers and nightsweats, significantly elevated acute phase reactants, despite appropriate antibiotic therapy and source control.        History of the Infectious Disease lllness:     Thought initially he had food poisoning, ate some leftover food that he kept in his car on a hot day, began to develop symptoms rapidly throughout that day (9/21). That evening developed rigors, chills, fever, nightsweats, vomiting (9/21), then developed pain initially in his left shoulder near the left collar bone (9/22), right knee pain developed (9/23).     Presented to [facility] with several days of right knee and left-sided chest wall pain and swelling with associated fever (101.3), and recent nausea and vomiting. Blood cultures (9/25), joint aspirate cell count (>300,000 cells), crystal analysis (negative), and cultures were drawn. Started on IV vancomycin and cefepime (9/26). Right knee synovial aspirate was positive with 1+ Strep agalactiae (pan-sensitive) on Day 2 of incubation. Repeat synovial fluid culture (9/26) finalized with no growth to date. Curbside consult (9/27) with Dr. Guthrie, who provided further instructions on infectious workup and antibiotic narrowing. Repeat right knee aspirate (10/2) sent for gram stain, aerobic, and anaerobic culture, with Gram stain negative and repeat cultures finalized with no growth to date after 11 days. Left sternoclavicular aspirate culture grew 1+ Strep agalactiae collected (9/26) and positive on Day 1 of incubation. Daily blood cultures (9/25 - 10/1) finalized with no growth to date. Other negative infectious workup includes: urinalysis, Chlamydia trachomatis/Neisseria  "gonorrheae PCR, SARS-CoV2 PCR, Influenza A and B PCR, MRSA nares, lyme DNA PCR, Hep B surface Agn, Hep A IgM antibody, Hep C antibody. Hep B surface antibody reactive with >1,000.00.    Right knee s/p I&D (9/26, 9/28, 10/2)  Left Sternoclavicular joint I&D (9/28, 10/2)    Throughout the patient's hospitalization he continued to have daily fevers with a Tmax of 102.9 on 10/1. CRP significantly elevated (309.97) on admission and trended down to 206.4 on discharge (10/5); leukocytosis of 12.5 on admission but resolving (10/4); ESR elevated on admissoion. Developed transaminitis with elevated alk phos and LFTs (9/27), which continued to rise and began trending down after 10/4.      CT  right knee with contrast on 9/25/22 showed Massive knee joint effusion. Mild osteoarthrosis of the patellofemoral joint.  CT sternum SC joints (9/27/22) showed moderate effusion left sternoclavicular joint w/overlying soft tissue stranding. No abscess or osteomyelitis  Remaining scans are negative   TTE: negative signs of endocarditis, no valvular vegetations, pericardial effusion. Normal EF      History of monoarticular \"flare ups\" with swelling and pain that will last several days, then resolve on their own. Has had this for about 12 years. Denies any heat to the joint or erythema during these flare ups.      Childhood healthy, played lots of contact sports but denies any major injuries requiring surgery or joint injections.    Grandfather history of gout, DMII. Father hypercholesterolemia. Mom otherwise healthy.    HOME/ENVIRONMENT: house, no concerns for pests or mold    OCCUPATION: Information technology    TRAVEL: born in [place], traveled to [place] in august. No other recent travel outside [place]    OUTDOOR ACTIVITIES:   - Camping: no, but enjoys fishing (June)  - Cave exploring: no   - Walking barefoot on soil or grass: no  - Swimming or wading in rivers, lakes or streams: no this year  - Hot tubs or Jacuzzis: no    ANIMALS: one dog, no " farm animal exposure    FOOD/WATER:   - Raw undercooked meat or sushi: eats sushi once a month  - Unwashed fruits or vegetables: yes  - Unpasteurized milk: no  - Well water: no    DENTAL WORK: no    TUBERCULOSIS:   - Known TB exposures: no  - Prior TB testing results: no  - Healthcare work: no : no  - Homelessness: no  - FCI: no    TOBACCO/ALCOHOL/RECREATIONAL DRUGS: occasional alcoholic drink    SEXUAL ACTIVITY: yes, one-partner    Review of systems is otherwise negative. Pertinent positives and negatives listed above.      No past medical history on file.    Past Surgical History:   Procedure Laterality Date     ARTHROSCOPY KNEE INCISION AND DRAINAGE Bilateral 9/26/2022    Procedure: Right knee arthroscopic irrigation and debridement, partial synovectomy and left sternoclavicular joint needle aspiration;  Surgeon: Jovani Hahn MD;  Location: [facility] OR     ARTHROSCOPY KNEE IRRIGATION AND DEBRIDEMENT Right 9/28/2022    Procedure: Right IRRIGATION AND DEBRIDEMENT, KNEE, ARTHROSCOPIC;  Surgeon: Jovani Hahn MD;  Location: [facility] OR     ARTHROSCOPY KNEE IRRIGATION AND DEBRIDEMENT Right 10/2/2022    Procedure: IRRIGATION AND DEBRIDEMENT, KNEE, ARTHROSCOPIC RIGHT;  Surgeon: Zeke Vazquez MD;  Location: [facility] OR     IRRIGATION AND DEBRIDEMENT NECK, COMBINED Left 9/28/2022    Procedure: Open IRRIGATION AND DEBRIDEMENT of Sternal Clavicular Joint Left;  Surgeon: Jovani Hahn MD;  Location: [facility] OR     IRRIGATION AND DEBRIDEMENT SHOULDER, COMBINED Left 10/2/2022    Procedure: Left Sternoclavicular Joint Irrigation And Debridement;  Surgeon: Zeke Vazquez MD;  Location: [facility] OR       No family history on file.    Social History     Social History Narrative     Not on file          Immunization History   Administered Date(s) Administered     COVID-19,PF,Moderna 04/14/2021, 05/12/2021       Patient Active Problem List   Diagnosis     Left-sided chest wall pain     Effusion of right knee  joint     Febrile illness     Septic arthritis of knee, right (H)       No outpatient medications have been marked as taking for the 10/14/22 encounter (Appointment) with  [facility].       No Known Allergies           Physical Exam:   Vitals were reviewed.  All vitals stable  /75   Pulse 115   Wt 79.6 kg (175 lb 8 oz)   SpO2 98%   BMI 23.80 kg/m    Wt Readings from Last 4 Encounters:   09/26/22 86.9 kg (191 lb 9.3 oz)       Exam:  GENERAL: well-developed, well-nourished, alert, oriented, in no acute distress.  HEAD: Head is normocephalic, atraumatic   EYES: Eyes have anicteric sclerae.    ENT: Oropharynx is moist without exudates or ulcers.  NECK: Supple.   LUNGS: Clear to auscultation, no wheezes or crackles  CARDIOVASCULAR: Regular rate and rhythm with no murmurs, gallops or rubs.  SKIN: left sternoclavicular I&D site well-healed, without dehiscence, erythema, drainage, or tenderness. Right knee not assessed as it was seen by Dr. Hahn on 10/13 for respiration and wrapped with compressive dressing to help decrease effusion. Spoke with Dr. Hahn on the phone 10/14/2022 and he states the fluid on aspiration was bloody and serous, and did not look infectious. No acute rashes.   NEUROLOGIC: Grossly nonfocal         Laboratory Data:     Inflammatory Markers    Recent Labs   Lab Test 10/14/22  1137 10/05/22  0508 10/04/22  0541 10/02/22  0516 10/01/22  0614 09/30/22  0601 09/28/22  0454 09/27/22  0454 09/25/22  1854   SED  --  124*  --   --   --   --   --   --  73*   CRP 51.40*  --  206.40* 217.99* 222.72* 213.18* 216.87* 269.53* 309.97*       Metabolic Studies    Recent Labs   Lab Test 10/14/22  1137 10/05/22  0508 10/04/22  0541 10/01/22  1522 09/26/22  0120 09/25/22  1854    136 136  --    < > 135*   POTASSIUM 3.9 4.1 4.3  --    < > 3.9   CHLORIDE 98 96* 98  --    < > 99   CO2 26 27 25  --    < > 24   ANIONGAP 13 13 13  --    < > 12   BUN 11.6 13.1 11.6  --    < > 10.7   CR 0.82 0.75 0.72  --    <  > 0.84   GFRESTIMATED >90 >90 >90  --    < > >90   GLC 99 116* 137*  --    < > 113*   JOCELYNE 9.9 9.2 9.1  --    < > 9.3   URIC  --   --   --   --   --  3.9   LACT  --   --   --  1.2   < >  --    CKT  --   --  50  --   --   --     < > = values in this interval not displayed.       Hepatic Studies    Recent Labs   Lab Test 10/14/22  1137 10/05/22  0508 10/04/22  1730   BILITOTAL 0.4 0.4 0.4   DBIL  --   --  <0.20   ALKPHOS 120 237* 263*   PROTTOTAL 9.4* 8.2 7.9   ALBUMIN 3.9 3.2* 3.1*   AST 71* 91* 146*   * 241* 284*       Gout Labs      Recent Labs   Lab Test 09/25/22  1854   URIC 3.9       Hematology Studies   Recent Labs   Lab Test 10/14/22  1127 10/05/22  0508 10/04/22  0541 10/02/22  0516   WBC 3.0* 10.4 10.5 12.3*   ANEUTAUTO 1.1*  --  7.6  --    ALYMPAUTO 1.4  --  1.5  --    AMONOAUTO 0.5  --  1.2  --    AEOSAUTO 0.0  --  0.1  --    ABSBASO 0.0  --  0.1  --    HGB 10.4* 9.1* 9.2* 9.9*   HCT 32.4* 27.5* 27.4* 29.6*    793* 740* 676*       Clotting Studies    Recent Labs   Lab Test 10/04/22  1730   INR 1.14       Iron Testing    Recent Labs   Lab Test 10/14/22  1127 10/05/22  0508 10/04/22  1730   IRON  --   --  12*   FEB  --   --  184*   IRONSAT  --   --  7*   RUBY  --   --  1,328*   MCV 86   < >  --     < > = values in this interval not displayed.         Urine Studies     Recent Labs   Lab Test 10/04/22  1955   URINEPH 7.0   NITRITE Negative   LEUKEST Negative       Medication levels    Recent Labs   Lab Test 09/28/22  2141   VANCOMYCIN <4.0           Microbiology:     Last Culture results   Culture   Date Value Ref Range Status   10/02/2022 No anaerobic organisms isolated after 11 days  Preliminary   10/02/2022 No Growth  Final   10/01/2022 No Growth  Final   09/30/2022 No Growth  Final   09/30/2022 No Growth  Final   09/29/2022 No Growth  Final   09/28/2022 No Growth  Final   09/27/2022 No Growth  Final   09/26/2022 No anaerobic organisms isolated  Final   09/26/2022 No Growth  Final   09/26/2022  No anaerobic organisms isolated  Final   09/26/2022 (A)  Final    1+ Streptococcus agalactiae (Group B Streptococcus)     Comment:     Susceptibilities done on previous cultures   09/26/2022 No Growth  Final   09/25/2022 (A)  Final    1+ Streptococcus agalactiae (Group B Streptococcus)   09/25/2022 No Growth  Final           Syphilis Testing  Invalid input(s): [id number]      Virology:  Coronavirus-19 testing    Recent Labs   Lab Test 09/25/22  1543   [id number] Negative           Hepatitis B Testing     Recent Labs   Lab Test 10/04/22  1730   AUSAB >1,000.00   HEPBANG Nonreactive     Was the last Hepatitis B E antigen positive?   No results found for: HBEAGN     Hepatitis C Antibody   Date Value Ref Range Status   10/04/2022 Nonreactive Nonreactive Final           Imaging:  Results for orders placed or performed during the hospital encounter of 09/25/22   XR Chest 2 Views    Narrative    EXAM: XR CHEST 2 VIEWS  LOCATION: [facility]  DATE/TIME: 9/25/2022 4:18 PM    INDICATION: Left chest and shoulder pain  COMPARISON: None.      Impression    IMPRESSION: Cardiomediastinal silhouette is normal. Normal vasculature. Lungs and pleural spaces are clear. No fracture evident.   XR Knee Right 3 Views    Narrative    EXAM: XR KNEE RIGHT 3 VIEWS  LOCATION: [facility]  DATE/TIME: 9/25/2022 4:16 PM    INDICATION: pain  COMPARISON: None.      Impression    IMPRESSION: Normal joint spaces and alignment. No fracture. Moderate joint effusion.   CT Knee Right w Contrast    Narrative    EXAM: CT KNEE RIGHT W CONTRAST  LOCATION: [facility]  DATE/TIME: 9/25/2022 8:46 PM    INDICATION: Right distal femur anterior swelling and knee swelling. Rule out abscess or other abnormality.  COMPARISON: Radiographs from 9/25/2022.  TECHNIQUE: IV contrast. Axial, sagittal and coronal thin-section reconstruction. Dose reduction techniques were used.   CONTRAST: 100 mL  Isovue 370.    FINDINGS:     BONES AND JOINTS:  -There is a massive knee joint effusion. No calcified intra-articular body. No fracture. Mild osteoarthrosis of the patellofemoral joint.    SOFT TISSUES:  -No hematoma, cyst or mass. No gross muscle or tendon pathology. No evidence of abscess.      Impression    IMPRESSION:  1.  Massive knee joint effusion.  2.  Mild osteoarthrosis of the patellofemoral joint.  3.  Otherwise negative.     CT Sternum SC Joints without Contrast    Narrative    EXAM: CT STERNUM SC JOINTS WITHOUT CONTRAST  LOCATION: [facility]  DATE/TIME: 9/27/2022 6:28 PM    INDICATION: Chest pain. Left sternoclavicular joint effusion tapped and positive for Group B Strep, evaluate for extent of septic effusion inflammation.  COMPARISON: None.  TECHNIQUE: With IV contrast. Axial, sagittal and coronal thin-section reconstruction. Dose reduction techniques were used.   CONTRAST: 85 mL Isovue-300 were administered.    FINDINGS:     BONES:  -No fracture. No definite evidence of osteomyelitis. Small accessory ossicle at the cephalad margin of the manubrium. Normal alignment.     SOFT TISSUES:  -Moderate-sized effusion within the left sternoclavicular joint with mild overlying soft tissue swelling and localized soft tissue stranding consistent with the patient's history of septic arthritis. No adenopathy. No discrete fluid collection to suggest   an abscess. Visualized portions of both upper lungs normal. Mediastinum unremarkable. Normal appearing right sternoclavicular joint.      Impression    IMPRESSION:  1.  No evidence of an abscess or osteomyelitis.    2.  Moderate-sized effusion within the left sternoclavicular joint with overlying soft tissue stranding compatible with the history of septic arthritis.     CT Soft tissue neck w contrast*    Narrative    EXAM: CT SOFT TISSUE NECK W CONTRAST  LOCATION: [facility]  DATE/TIME: 10/1/2022 4:25  PM    INDICATION: Recurrent fevers. Polyarticular infectious/inflammatory process, eval for additional abscess or infection  COMPARISON: None.  CONTRAST: 50 mL Isovue 370  TECHNIQUE: Routine CT Soft Tissue Neck with IV contrast. Multiplanar reformats. Dose reduction techniques were used.    FINDINGS:     MUCOSAL SPACES/SOFT TISSUES: Normal mucosal spaces of the upper aerodigestive tract. No mucosal mass or inflammation identified. Normal vocal cords and infraglottic trachea. Normal parapharyngeal space and  spaces. Normal oral cavity,    spaces, and floor of mouth structures.    LYMPH NODES: Shotty bilateral cervical lymph nodes, none frankly pathologic by size or morphology criteria.     SALIVARY GLANDS: Normal parotid and submandibular glands.    THYROID: Normal.     VESSELS: Vascular structures of the neck are patent.    VISUALIZED INTRACRANIAL/ORBITS/SINUSES: No abnormality of the visualized intracranial compartment or orbits. Visualized paranasal sinuses and mastoid air cells are clear.    OTHER: Left sternoclavicular joint effusion without aggressive/erosive osseous changes. Overlying cutaneous fat stranding and small locules of subcutaneous gas consistent with surgical drainage. Otherwise unremarkable visualized osseous structures. The   included lung apices are clear.      Impression    IMPRESSION:   1.  Persistent left sternoclavicular joint effusion as seen on dedicated CT from 09/27/2022. Changes of interval surgical drainage without evidence for aggressive osseous erosion/destruction.  2.  No additional evidence for an infectious/inflammatory process in the neck.  3.  Nonspecific shotty cervical lymph nodes are presumably reactive in nature.   CT Chest/Abdomen/Pelvis w Contrast    Narrative    EXAM: CT CHEST/ABDOMEN/PELVIS W CONTRAST  LOCATION: [facility]  DATE/TIME: 10/1/2022 4:35 PM    INDICATION: Fever, eval for abscess or infection.  COMPARISON:  None.  TECHNIQUE: CT scan of the chest, abdomen, and pelvis was performed following injection of IV contrast. Multiplanar reformats were obtained. Dose reduction techniques were used.   CONTRAST: 50 mL Isovue 370.    FINDINGS:   LUNGS AND PLEURA: Normal.    MEDIASTINUM/AXILLAE: Normal.    CORONARY ARTERY CALCIFICATION: None.    HEPATOBILIARY: Normal.    PANCREAS: Normal.    SPLEEN: Normal.    ADRENAL GLANDS: Normal.    KIDNEYS/BLADDER: Normal.    BOWEL: Normal.    LYMPH NODES: Normal.    VASCULATURE: Unremarkable.    PELVIC ORGANS: Normal.    MUSCULOSKELETAL: Soft tissue swelling overlying the left sternoclavicular joint with a bubble of air present superficially. This could be due to infection. There is no evidence for osteomyelitis.      Impression    IMPRESSION:  1.  Soft tissue swelling left sternoclavicular joint with some soft tissue air present suspicious for infection. No CT evidence for osteomyelitis.   CT Head w contrast    Narrative    EXAM: CT HEAD W CONTRAST  LOCATION: [facility]  DATE/TIME: 10/1/2022 4:44 PM    INDICATION: Recurrent fevers. Concern for polyarticular septic arthritis. Eval for abscess or infection  COMPARISON: None.  CONTRAST: 25 mL Isovue 370  TECHNIQUE: Routine CT Head with IV contrast.  Dose reduction techniques were used.    FINDINGS:  INTRACRANIAL CONTENTS: No intracranial hemorrhage, extraaxial collection, or mass effect.  No CT evidence of acute infarct. Normal parenchymal attenuation. Normal ventricles and sulci. No pathologic enhancement identified.    VISUALIZED ORBITS/SINUSES/MASTOIDS: No intraorbital abnormality. No paranasal sinus mucosal disease. No middle ear or mastoid effusion.    BONES/SOFT TISSUES: No acute abnormality.      Impression    IMPRESSION:  1.  No evidence for acute intracranial process.   US Lower Extremity Venous Duplex Bilateral    Narrative    EXAM: US LOWER EXTREMITY VENOUS DUPLEX BILATERAL  LOCATION: [facility]  [facility]  DATE/TIME: 10/1/2022 5:19 PM    INDICATION: Pain and swelling.  COMPARISON: None.  TECHNIQUE: Venous Duplex ultrasound of bilateral lower extremities with and without compression, augmentation and duplex. Color flow and spectral Doppler with waveform analysis performed.    FINDINGS: Exam includes the common femoral, femoral, popliteal veins as well as segmentally visualized deep calf veins and greater saphenous vein.     RIGHT: No deep vein thrombosis. No superficial thrombophlebitis. No popliteal cyst.    LEFT: No deep vein thrombosis. No superficial thrombophlebitis. No popliteal cyst.      Impression    IMPRESSION:  1.  No deep venous thrombosis in the bilateral lower extremities.   XR Chest Port 1 View    Narrative    XR CHEST PORT 1 VIEW   10/4/2022 1:59 PM     HISTORY: RN placed PICC - verify tip placement    COMPARISON: CT 10/1/2022.      Impression    IMPRESSION: Placement of right upper extremity PICC, tip overlying the  superior vena cava. Normal cardiomediastinal silhouette. Lungs are  clear. No acute bony abnormality.    HELIO ACOSTA MD         SYSTEM ID:  [id number]   US Abdomen Limited    Narrative    EXAM: US ABDOMEN LIMITED  LOCATION: [facility]  DATE/TIME: 10/4/2022 7:23 PM    INDICATION: Increasing liver enzymes. Mild right upper quadrant tenderness.  COMPARISON: CT abdomen pelvis 10/01/2022.  TECHNIQUE: Limited abdominal ultrasound.    FINDINGS:    GALLBLADDER: Normal. No gallstones, wall thickening, or pericholecystic fluid. Negative sonographic Ramos's sign.    BILE DUCTS: No biliary dilatation. The common duct measures 4 mm.    LIVER: Normal parenchyma with smooth contour. No focal mass.    RIGHT KIDNEY: No hydronephrosis.    PANCREAS: The visualized portions are normal.    No ascites.      Impression    IMPRESSION:    1.  Normal limited abdominal ultrasound.   Echocardiogram Complete     Value    LVEF  60-65%    Narrative     [id number]  [id number]  [id number]  [id number]^AGNESLA^MELANY^MADHU     [facility]  Echocardiography Laboratory  [address]  [place] [id number]     Name: SEDRICK TONY  MRN: [id number]  : 1990  Study Date: 2022 10:59 AM  Age: 32 yrs  Gender: Male  Patient Location: [facility]  Reason For Study: Endocarditis  Ordering Physician: EMLANY MALDONADO  Performed By: Mary Russell RDCS     BSA: 2.1 m2  Height: 72 in  Weight: 191 lb  HR: 99  BP: 119/64 mmHg  ______________________________________________________________________________  Procedure  Complete Portable Echo Adult.  ______________________________________________________________________________  Interpretation Summary     Left ventricular systolic function is normal.  The visual ejection fraction is 60-65%.  The right ventricle is normal in structure, function and size.  Valve structures without significant dysfunction. No apparent valvular  vegetations.  The inferior vena cava was normal in size with preserved respiratory  variability.  There is no pericardial effusion.     No prior study for comparison. Consider LINDA if clnically indicated to assess  for endocarditis.  ______________________________________________________________________________  Left Ventricle  The left ventricle is normal in structure, function and size. Left ventricular  systolic function is normal. The visual ejection fraction is 60-65%. Normal  left ventricular wall motion.     Right Ventricle  The right ventricle is normal in structure, function and size.     Atria  Normal left atrial size. Right atrial size is normal.     Mitral Valve  The mitral valve is normal in structure and function.     Tricuspid Valve  The tricuspid valve is normal in structure and function. Right ventricular  systolic pressure could not be approximated due to inadequate tricuspid  regurgitation.     Aortic Valve  The aortic valve is normal in structure and function.     Pulmonic  Valve  The pulmonic valve is normal in structure and function.     Vessels  The aortic root is normal size. Normal size ascending aorta. The inferior vena  cava was normal in size with preserved respiratory variability.     Pericardium  There is no pericardial effusion.     ______________________________________________________________________________  MMode/2D Measurements & Calculations  IVSd: 1.1 cm  LVIDd: 4.7 cm  LVIDs: 2.8 cm  LVPWd: 0.97 cm  FS: 39.5 %     LV mass(C)d: 169.1 grams  LV mass(C)dI: 80.9 grams/m2  Ao root diam: 3.4 cm  LA dimension: 2.9 cm  asc Aorta Diam: 2.9 cm  LA/Ao: 0.84  RWT: 0.41     Doppler Measurements & Calculations  MV E max jerri: 84.9 cm/sec  MV A max jerri: 96.1 cm/sec  MV E/A: 0.88  MV dec time: 0.13 sec  PA acc time: 0.12 sec  E/E' av.5  Lateral E/e': 8.5  Medial E/e': 8.6     ______________________________________________________________________________  Report approved by: Kwaku Mota 2022 02:08 PM               Duplex Ultrasound Right Lower Extremity 10/14/2022    Impression:  1. Acute occlusive DVT of one of the paired posterior tibial veins  from the proximal calf to ankle.     2. The other posterior tibial vein with acute DVT in the mid calf.      TTE 22  Interpretation Summary     Left ventricular systolic function is normal.  The visual ejection fraction is 60-65%.  The right ventricle is normal in structure, function and size.  Valve structures without significant dysfunction. No apparent valvular  vegetations.  The inferior vena cava was normal in size with preserved respiratory  variability.  There is no pericardial effusion.     No prior study for comparison. Consider LINDA if clnically indicated to assess  for endocarditis.  ______________________________________________________________________________  Left Ventricle  The left ventricle is normal in structure, function and size. Left ventricular  systolic function is normal. The visual ejection  fraction is 60-65%. Normal  left ventricular wall motion.     Right Ventricle  The right ventricle is normal in structure, function and size.     Atria  Normal left atrial size. Right atrial size is normal.     Mitral Valve  The mitral valve is normal in structure and function.     Tricuspid Valve  The tricuspid valve is normal in structure and function. Right ventricular  systolic pressure could not be approximated due to inadequate tricuspid  regurgitation.     Aortic Valve  The aortic valve is normal in structure and function.     Pulmonic Valve  The pulmonic valve is normal in structure and function.     Vessels  The aortic root is normal size. Normal size ascending aorta. The inferior vena  cava was normal in size with preserved respiratory variability.     Pericardium  There is no pericardial effusion.

## 2022-10-14 NOTE — TELEPHONE ENCOUNTER
US showed patient has DVT left posterior tibal veins.     RN gave verbal report to Dr. Munoz and Lily SHANNON APRN, CNP.       Claude Luke RN  Infectious Disease 11:18 AM 10/14/22

## 2022-10-14 NOTE — NURSING NOTE
Chief Complaint   Patient presents with     RECHECK     Hospital follow up     Blood pressure 113/75, pulse 115, weight 79.6 kg (175 lb 8 oz), SpO2 98 %.    Reina Grossman

## 2022-10-14 NOTE — LETTER
Date:October 17, 2022      Patient was self referred, no letter generated. Do not send.        [facility] Health Information

## 2022-10-15 LAB — T PALLIDUM AB SER QL: NONREACTIVE

## 2022-10-16 LAB — BACTERIA SNV CULT: NORMAL

## 2022-10-17 ENCOUNTER — TELEPHONE (OUTPATIENT)
Dept: INFECTIOUS DISEASES | Facility: CLINIC | Age: 32
End: 2022-10-17

## 2022-10-17 LAB — RHEUMATOID FACT SER NEPH-ACNC: <6 IU/ML

## 2022-10-17 NOTE — TELEPHONE ENCOUNTER
Spoke with Pt today, he would prefer to have the labs drawn on Wednesday when he has nurse out for his PICC line dressing change.   Labs have already been ordered. [facility] can do the labs as well.     If there is an issue with him waiting till Wednesday he would go to [facility] to have drawn instead.       Pt is going to need repeat labs on Monday/Tuesday to trend his low WBC and elevated LFTs.   Thank you!   Lily

## 2022-10-18 NOTE — TELEPHONE ENCOUNTER
OK per NP Lily to have labs done Wed when nurse does dressing change.   Giuseppe notified and already has coordinated with nurse for wed.

## 2022-10-19 ENCOUNTER — LAB REQUISITION (OUTPATIENT)
Dept: LAB | Facility: CLINIC | Age: 32
End: 2022-10-19
Payer: COMMERCIAL

## 2022-10-19 DIAGNOSIS — M00.261: ICD-10-CM

## 2022-10-19 LAB
ALBUMIN SERPL BCG-MCNC: 3.5 G/DL (ref 3.5–5.2)
ALP SERPL-CCNC: 80 U/L (ref 40–129)
ALT SERPL W P-5'-P-CCNC: 102 U/L (ref 10–50)
ANION GAP SERPL CALCULATED.3IONS-SCNC: 12 MMOL/L (ref 7–15)
AST SERPL W P-5'-P-CCNC: 72 U/L (ref 10–50)
BACTERIA BLD CULT: NO GROWTH
BACTERIA BLD CULT: NO GROWTH
BASOPHILS # BLD MANUAL: 0.1 10E3/UL (ref 0–0.2)
BASOPHILS NFR BLD MANUAL: 2 %
BILIRUB SERPL-MCNC: 0.2 MG/DL
BUN SERPL-MCNC: 10.9 MG/DL (ref 6–20)
CALCIUM SERPL-MCNC: 9.3 MG/DL (ref 8.6–10)
CHLORIDE SERPL-SCNC: 100 MMOL/L (ref 98–107)
CREAT SERPL-MCNC: 0.8 MG/DL (ref 0.67–1.17)
CRP SERPL-MCNC: 28.71 MG/L
DEPRECATED HCO3 PLAS-SCNC: 25 MMOL/L (ref 22–29)
EOSINOPHIL # BLD MANUAL: 0.2 10E3/UL (ref 0–0.7)
EOSINOPHIL NFR BLD MANUAL: 6 %
ERYTHROCYTE [DISTWIDTH] IN BLOOD BY AUTOMATED COUNT: 13.4 % (ref 10–15)
GFR SERPL CREATININE-BSD FRML MDRD: >90 ML/MIN/1.73M2
GLUCOSE SERPL-MCNC: 122 MG/DL (ref 70–99)
HCT VFR BLD AUTO: 28.8 % (ref 40–53)
HGB BLD-MCNC: 9.1 G/DL (ref 13.3–17.7)
HOLD SPECIMEN: NORMAL
LYMPHOCYTES # BLD MANUAL: 1.3 10E3/UL (ref 0.8–5.3)
LYMPHOCYTES NFR BLD MANUAL: 47 %
MCH RBC QN AUTO: 27 PG (ref 26.5–33)
MCHC RBC AUTO-ENTMCNC: 31.6 G/DL (ref 31.5–36.5)
MCV RBC AUTO: 86 FL (ref 78–100)
MONOCYTES # BLD MANUAL: 0.6 10E3/UL (ref 0–1.3)
MONOCYTES NFR BLD MANUAL: 22 %
NEUTROPHILS # BLD MANUAL: 0.6 10E3/UL (ref 1.6–8.3)
NEUTROPHILS NFR BLD MANUAL: 23 %
PLAT MORPH BLD: ABNORMAL
PLATELET # BLD AUTO: 432 10E3/UL (ref 150–450)
POTASSIUM SERPL-SCNC: 4.2 MMOL/L (ref 3.4–5.3)
PROT SERPL-MCNC: 8.5 G/DL (ref 6.4–8.3)
RBC # BLD AUTO: 3.37 10E6/UL (ref 4.4–5.9)
RBC MORPH BLD: ABNORMAL
SODIUM SERPL-SCNC: 137 MMOL/L (ref 136–145)
WBC # BLD AUTO: 2.7 10E3/UL (ref 4–11)

## 2022-10-19 PROCEDURE — 86140 C-REACTIVE PROTEIN: CPT | Mod: ORL | Performed by: INTERNAL MEDICINE

## 2022-10-19 PROCEDURE — 80053 COMPREHEN METABOLIC PANEL: CPT | Mod: ORL | Performed by: INTERNAL MEDICINE

## 2022-10-19 PROCEDURE — 85027 COMPLETE CBC AUTOMATED: CPT | Mod: ORL | Performed by: INTERNAL MEDICINE

## 2022-10-19 PROCEDURE — 85007 BL SMEAR W/DIFF WBC COUNT: CPT | Mod: ORL | Performed by: INTERNAL MEDICINE

## 2022-10-20 ENCOUNTER — TELEPHONE (OUTPATIENT)
Dept: INFECTIOUS DISEASES | Facility: CLINIC | Age: 32
End: 2022-10-20

## 2022-10-20 DIAGNOSIS — Z87.39 HISTORY OF JOINT EFFUSION: ICD-10-CM

## 2022-10-20 DIAGNOSIS — R79.89 ELEVATED LFTS: ICD-10-CM

## 2022-10-20 DIAGNOSIS — M00.261 STREPTOCOCCAL ARTHRITIS OF RIGHT KNEE (H): Primary | ICD-10-CM

## 2022-10-20 DIAGNOSIS — R50.9 FEVER AND CHILLS: ICD-10-CM

## 2022-10-20 RX ORDER — AMOXICILLIN 500 MG/1
500 CAPSULE ORAL 3 TIMES DAILY
Qty: 21 CAPSULE | Refills: 0 | Status: SHIPPED | OUTPATIENT
Start: 2022-10-20 | End: 2022-10-24

## 2022-10-20 NOTE — TELEPHONE ENCOUNTER
CANDI Chin at [facility] and asked that he call back to confirm.     This patient's WBC is still downtrending so were stopping the ceftriaxone and switching him to oral Amoxicillin for the last week of treatment. Would you be able to get it coordinated to have his PICC removed?   Thank you,   Lily Chin confirmed he received the message.

## 2022-10-20 NOTE — TELEPHONE ENCOUNTER
4:02 PM on 10/20/2022    Called the patient and left a voicemail to let him know that his white blood cell count has continued to drop and we are concerned about it being due to the cetriaxone. I stated we are stopping the ceftriaxone infusion and will be sending a prescription for amoxicillin to his pharmacy.  Sending a prescription for Amoxicillin 500 mg TID for 7 days. Have sent a communication to Laurie to get his PICC removed and to the clinic coordinators to have his scheduled for a follow up since he is not on the schedule yet. Will try calling again tomorrow to ensure he understand the current labs and our change in plans.    RESHMA Freitas, CNP  Infectious Diseases

## 2022-10-21 ENCOUNTER — NURSE TRIAGE (OUTPATIENT)
Dept: NURSING | Facility: CLINIC | Age: 32
End: 2022-10-21

## 2022-10-22 NOTE — TELEPHONE ENCOUNTER
"Patient''s significant other with patient who gave consent.    Patient has developed a \"little rash.\" Right thigh about 3X3 area and below elbow, 2X1.  Red and bumpy.  These areas have an ace wrap on the leg and PICC line sleeve in on elbow area.    Disposition to home care.   Caller verbalizes understanding of care advice and agrees with plan.    Iliana Villanueva RN  [facility]         Reason for Disposition    Localized hives    Additional Information    Negative: [1] Sudden onset of rash (within last 2 hours) AND [2] difficulty breathing or swallowing    Negative: Sounds like a life-threatening emergency to the triager    Negative: [1] Localized purple or blood-colored spots or dots AND [2] not from injury or friction AND [3] fever    Negative: Patient sounds very sick or weak to the triager    Negative: [1] Red area or streak AND [2] fever    Negative: [1] Rash is painful to touch AND [2] fever    Negative: [1] Life-threatening reaction (anaphylaxis) in the past to similar substance (e.g., food, insect bite/sting, chemical, etc.) AND [2] < 2 hours since exposure    Negative: Difficulty breathing or wheezing now    Negative: [1] Swollen tongue AND [2] rapid onset    Negative: [1] Hoarseness or cough now AND [2] rapid onset    Negative: Shock suspected (e.g., cold/pale/clammy skin, too weak to stand, low BP, rapid pulse)    Negative: Difficult to awaken or acting confused (e.g., disoriented, slurred speech)    Negative: Sounds like a life-threatening emergency to the triager    Negative: Swollen tongue    Negative: [1] Widespread hives, itching or facial swelling AND [2] onset < 2 hours of exposure to high-risk allergen (e.g., sting, nuts, 1st dose of antibiotic)    Negative: Patient sounds very sick or weak to the triager    Negative: [1] MODERATE-SEVERE hives persist (i.e., hives interfere with normal activities or work) AND [2] taking antihistamine (e.g., Benadryl, Claritin) > 24 hours    Negative: " [1] Hives have become worse AND [2] taking oral steroids (e.g., prednisone) > 24 hours    Negative: Joint swelling    Negative: [1] Abdominal pain AND [2] pain present > 12 hours    Negative: Fever    Negative: [1] Widespread hives, itching or facial swelling AND [2] onset > 2 hours after exposure to high-risk allergen (e.g., sting, nuts, 1st dose of antibiotic)    Negative: [1] Hives from food reaction AND [2] diagnosis never confirmed by a doctor (or NP/PA)    Negative: Hives persist > 1 week    Negative: [1] Hives has occurred 3 or more times in the last year AND [2] the cause was not found    Negative: [1] Hives from food reaction AND [2] diagnosis already confirmed    Protocols used: HIVES-A-AH, RASH OR REDNESS - [id number]

## 2022-10-23 ASSESSMENT — ENCOUNTER SYMPTOMS
CHILLS: 0
STIFFNESS: 0
MUSCLE CRAMPS: 0
BACK PAIN: 0
POLYPHAGIA: 0
MUSCLE WEAKNESS: 0
WEIGHT GAIN: 0
FEVER: 1
SKIN CHANGES: 0
WEIGHT LOSS: 0
ALTERED TEMPERATURE REGULATION: 0
NECK PAIN: 0
HALLUCINATIONS: 0
POOR WOUND HEALING: 0
MYALGIAS: 0
POLYDIPSIA: 0
DECREASED APPETITE: 0
INCREASED ENERGY: 0
JOINT SWELLING: 1
NAIL CHANGES: 0
ARTHRALGIAS: 1
NIGHT SWEATS: 1

## 2022-10-24 ENCOUNTER — LAB (OUTPATIENT)
Dept: LAB | Facility: CLINIC | Age: 32
End: 2022-10-24
Payer: COMMERCIAL

## 2022-10-24 ENCOUNTER — OFFICE VISIT (OUTPATIENT)
Dept: INFECTIOUS DISEASES | Facility: CLINIC | Age: 32
End: 2022-10-24
Attending: REGISTERED NURSE
Payer: MEDICAID

## 2022-10-24 VITALS
DIASTOLIC BLOOD PRESSURE: 84 MMHG | WEIGHT: 173 LBS | BODY MASS INDEX: 23.46 KG/M2 | HEART RATE: 111 BPM | OXYGEN SATURATION: 97 % | SYSTOLIC BLOOD PRESSURE: 123 MMHG

## 2022-10-24 DIAGNOSIS — R50.9 FEVER AND CHILLS: ICD-10-CM

## 2022-10-24 DIAGNOSIS — Z87.39 HISTORY OF JOINT EFFUSION: ICD-10-CM

## 2022-10-24 DIAGNOSIS — I82.441 ACUTE DEEP VEIN THROMBOSIS (DVT) OF TIBIAL VEIN OF RIGHT LOWER EXTREMITY (H): ICD-10-CM

## 2022-10-24 DIAGNOSIS — M00.261 STREPTOCOCCAL ARTHRITIS OF RIGHT KNEE (H): ICD-10-CM

## 2022-10-24 DIAGNOSIS — R79.89 ELEVATED LFTS: ICD-10-CM

## 2022-10-24 DIAGNOSIS — M00.261 STREPTOCOCCAL ARTHRITIS OF RIGHT KNEE (H): Primary | ICD-10-CM

## 2022-10-24 LAB
ALBUMIN SERPL BCG-MCNC: 4 G/DL (ref 3.5–5.2)
ALP SERPL-CCNC: 73 U/L (ref 40–129)
ALT SERPL W P-5'-P-CCNC: 64 U/L (ref 10–50)
ANION GAP SERPL CALCULATED.3IONS-SCNC: 12 MMOL/L (ref 7–15)
AST SERPL W P-5'-P-CCNC: 37 U/L (ref 10–50)
BASOPHILS # BLD AUTO: 0.1 10E3/UL (ref 0–0.2)
BASOPHILS NFR BLD AUTO: 2 %
BILIRUB SERPL-MCNC: 0.3 MG/DL
BUN SERPL-MCNC: 10.6 MG/DL (ref 6–20)
CALCIUM SERPL-MCNC: 9.8 MG/DL (ref 8.6–10)
CHLORIDE SERPL-SCNC: 98 MMOL/L (ref 98–107)
CREAT SERPL-MCNC: 0.86 MG/DL (ref 0.67–1.17)
CRP SERPL-MCNC: 35.2 MG/L
DACRYOCYTES BLD QL SMEAR: SLIGHT
DEPRECATED HCO3 PLAS-SCNC: 26 MMOL/L (ref 22–29)
EOSINOPHIL # BLD AUTO: 0.1 10E3/UL (ref 0–0.7)
EOSINOPHIL NFR BLD AUTO: 4 %
ERYTHROCYTE [DISTWIDTH] IN BLOOD BY AUTOMATED COUNT: 13.7 % (ref 10–15)
FERRITIN SERPL-MCNC: 503 NG/ML (ref 31–409)
GFR SERPL CREATININE-BSD FRML MDRD: >90 ML/MIN/1.73M2
GLUCOSE SERPL-MCNC: 94 MG/DL (ref 70–99)
HCT VFR BLD AUTO: 34.8 % (ref 40–53)
HGB BLD-MCNC: 10.8 G/DL (ref 13.3–17.7)
IMM GRANULOCYTES # BLD: 0 10E3/UL
IMM GRANULOCYTES NFR BLD: 0 %
LYMPHOCYTES # BLD AUTO: 2.1 10E3/UL (ref 0.8–5.3)
LYMPHOCYTES NFR BLD AUTO: 55 %
MCH RBC QN AUTO: 26.2 PG (ref 26.5–33)
MCHC RBC AUTO-ENTMCNC: 31 G/DL (ref 31.5–36.5)
MCV RBC AUTO: 84 FL (ref 78–100)
MONOCYTES # BLD AUTO: 1 10E3/UL (ref 0–1.3)
MONOCYTES NFR BLD AUTO: 27 %
NEUTROPHILS # BLD AUTO: 0.4 10E3/UL (ref 1.6–8.3)
NEUTROPHILS NFR BLD AUTO: 12 %
NRBC # BLD AUTO: 0 10E3/UL
NRBC BLD AUTO-RTO: 0 /100
PLAT MORPH BLD: ABNORMAL
PLATELET # BLD AUTO: 445 10E3/UL (ref 150–450)
POTASSIUM SERPL-SCNC: 4.1 MMOL/L (ref 3.4–5.3)
PROT SERPL-MCNC: 9.4 G/DL (ref 6.4–8.3)
RBC # BLD AUTO: 4.13 10E6/UL (ref 4.4–5.9)
RBC MORPH BLD: ABNORMAL
SODIUM SERPL-SCNC: 136 MMOL/L (ref 136–145)
WBC # BLD AUTO: 3.7 10E3/UL (ref 4–11)

## 2022-10-24 PROCEDURE — 80053 COMPREHEN METABOLIC PANEL: CPT | Performed by: PATHOLOGY

## 2022-10-24 PROCEDURE — 36415 COLL VENOUS BLD VENIPUNCTURE: CPT | Performed by: PATHOLOGY

## 2022-10-24 PROCEDURE — G0463 HOSPITAL OUTPT CLINIC VISIT: HCPCS

## 2022-10-24 PROCEDURE — 99000 SPECIMEN HANDLING OFFICE-LAB: CPT | Performed by: PATHOLOGY

## 2022-10-24 PROCEDURE — 99417 PROLNG OP E/M EACH 15 MIN: CPT | Performed by: REGISTERED NURSE

## 2022-10-24 PROCEDURE — 82728 ASSAY OF FERRITIN: CPT | Mod: 90 | Performed by: PATHOLOGY

## 2022-10-24 PROCEDURE — 85025 COMPLETE CBC W/AUTO DIFF WBC: CPT | Performed by: PATHOLOGY

## 2022-10-24 PROCEDURE — 86140 C-REACTIVE PROTEIN: CPT | Performed by: PATHOLOGY

## 2022-10-24 PROCEDURE — 99215 OFFICE O/P EST HI 40 MIN: CPT | Performed by: REGISTERED NURSE

## 2022-10-24 PROCEDURE — 86038 ANTINUCLEAR ANTIBODIES: CPT | Mod: 90 | Performed by: PATHOLOGY

## 2022-10-24 RX ORDER — TRAMADOL HYDROCHLORIDE 50 MG/1
50 TABLET ORAL EVERY 6 HOURS PRN
Status: ON HOLD | COMMUNITY
End: 2022-11-04

## 2022-10-24 RX ORDER — CELECOXIB 200 MG/1
200 CAPSULE ORAL DAILY
COMMUNITY
End: 2022-10-27

## 2022-10-24 NOTE — PATIENT INSTRUCTIONS
Stop the amoxcillin  Labs today: CBC w/diff, CMP, CRP, Ferritin, and UCHE   Repeat blood cultures in 1 week from stopping Amoxicillin on your local clinic on Monday, October 31st  Rheumatology scheduled for March, 2023 with Brittney Barrett PA-C but will communicate directly with Brittney to see if can be seen sooner.  Will contact you by [employer] about Celebrex to make sure ok to take with Eliquis  Continue following closely with Dr. Hahn for further therapeutic aspirations, will contact to ensure he can re-aspirate and send for analysis and cultures  Continue Eliquis 10mg PO twice daily for 7 days, and then 5mg twice daily for 3 months, duration will be further determined by PCP; stopped taking aspirin while on Eliquis.   Take benadryl as needed at night for itching and hives  Head to ED if you experience a fever (100.2 or higher), more frequent nightsweats, or chills or feeling worse in any way.  Will plan to follow up in-person in 1-2 weeks with Dr. Munoz or I

## 2022-10-24 NOTE — NURSING NOTE
Chief Complaint   Patient presents with     RECHECK     Blood pressure 123/84, pulse 111, weight 78.5 kg (173 lb), SpO2 97 %.    Rene Braun on 10/24/2022 at 3:02 PM

## 2022-10-24 NOTE — LETTER
Date:October 25, 2022      Patient was self referred, no letter generated. Do not send.        [facility] Information

## 2022-10-24 NOTE — LETTER
10/24/2022       RE: Sanket Guerrero  [address] [facility] [address]     Dear Colleague,    Thank you for referring your patient, Sanket Guerrero, to the [facility] INFECTIOUS DISEASE CLINIC [employer] [facility]. Please see a copy of my visit note below.    [facility]  Transplant Infectious Disease Clinic Note:  New Patient     Patient:  Sanket Guerrero, Date of birth 1990, Medical record number [id number]  Date of Visit:  10/24/2022  Consult requested by Dr. Braxton for evaluation of spontaneous septic arthritis of native right knee and left sternoclavicular joint.         Assessment and Recommendations:   Recommendations:  1. Stop amoxicillin today  2. Labs today: CBC w/diff, CMP, CRP, Ferritin, and UCHE   3. Repeat blood cultures in 1 week  4. Complete repeat echocardiogram to look for endocarditis (TTE) in two weeks  5. Rheumatology scheduled for March, 2023 with Brittney Barrett PA-C but will communicate directly with Brittney to see if he can be seen sooner.  6. Continue following closely with Dr. aHhn for further therapeutic aspirations; Left voicemail with Dr. Hahn to ask about joint aspiration this week with cultures and cell counts. Will tell patient to hold Eliquis 24 hours prior to procedure  7. Hold Celebrex until after joint aspiration; check with PCP if it is ok to take this with Eliquis  8. Continue Eliquis 10mg PO twice daily for 7 days, and then 5mg twice daily for 3 months, duration will be further determined by PCP; stopped taking aspirin while on Eliquis, use ibuprofen sparingly and at the lowest dose.   9. Follow up with primary care provider for on-going management of DVTl; scheduled this week  10. Follow up 1-2 weeks in-person or virtual with Dr. Munoz          Assessment:    Sanket Guerrero is a 32 year old male with a PMH significant for gout who presents today following a recent hospitalization  (9/25/22-10/5/22) for septic arthritis (Strep agalactiae) of his native right knee and left sternoclavicular joint septic arthritis s/p multiple washouts and c/b transaminitis. Discussed this patient with Dr. Munoz, who helped collaborate on the medical decision making on this patient given the complexity of the case. Patient well-appearing today and reports fevers and chills resolved 5 days ago. Sweats are less frequent but still daily.  Concern for underlying rheumatologic condition that could increase the patient's risk for spontaneous septic arthritis was considered by Dr. Hahn and ourselves. Right knee is still hot and swollen on exam. New rash on left upper bicep and around left elbow where adhesive and sleeve were for PICC. PICC removed. CRP continues to trend down and worsening leukopenia and ongoing anemia (potentially the ceftriaxone).   Rechecking CBC with diff, CMP, CRP, and Ferritin today. Also ordered UCHE; rheum appointment not until March, 2023. In contact with rheum, Brittney Barrett PA-C of Rheumatology, to see if appointment can be sooner. Though TTE and blood cultures negative in hospital, recommend repeated TTE to rule out endocarditis. Patient likely bacteremic at some point with left sternoclavicular and right knee infection with the same organism, making endocarditis a risk in the setting of 3+ weeks of fevers and nightsweats. In light of the new rash and intermittent throat tightness, and >4 weeks treatment for septic arthritis will stop Amoxicillin today and repeat blood cultures in one week. Adding amoxicillin to drug allergy list. Patient also started on Eliquis, Celebrex, and Tramadol and it is difficult to know which medication may have been the cause for rash and pruritus.     Recommend to patient and fiance that they have a low threshold for returning to the ED if any symptoms worsen. I am concerned with the continue systemic symptoms there is a source of infection we have yet to  identify versus a rheumatologic process.      Ultrasound right lower extremity 10/14/2022 positive for 2 acute occlusive clots present in the paired posterior tibial veins proximal calf to ankle and then in the other posterior tibial vein in the mid calf. Starting up wthi PCP to managed Eliquis and DVT treatment. Hold Eliquis until you are seen by PCP and until we know if joint aspirate will occur this week. Stopped ibuprofen while on Eliquis. Concern for increased risk of bleeding with Eliquis and celebrex.     Repeat blood cultures, treponema ABS with reflex to RPR and titer, HIV antigen antibody combo, uric acid, rheumatoid factor, hepatitis B core antibody all negative.       Will continue to plan on close follow up.      I spent 90 minutes as part of a shared visit on the date of the encounter doing chart review, history and exam, documentation.      RESHMA Freitas, CNP  Infectious Diseases  Pager# [phone]           Interval History:   Sanket Guerrero is a 32 year old male with a PMH significant for gout who presents today following a recent hospitalization (9/25/22-10/5/22) for septic arthritis (Strep agalactiae) of his native right knee and left sternoclavicular joint septic arthritis s/p multiple washouts and c/b transaminitis.     10/24/2022: fevers have resolved, over the past 5 days. Nightsweats have improved, less frequent but still daily, last was this early morning. Left leg cramps improved with walking, worse first thing in the AM (on day 10-11 of Eliquis). Right knee, unable to bend the knee fully due to swelling. Pain has improved since starting antibiotics. Occasional intermittent sharp pain in right hip, but goes away by itself, no swelling.    Rash began Thursday on right arm where PICC line sleeve was. Started the Amoxicillin Friday and itching and rash spread across his body, then PICC removed Saturday. Denies any pain or issues with the PICC line. Notes some difficulty swallowing though  "transient since starting amoxicillin. Tried benadryl two nights ago, which helped with the itching but the hives were persistent. Tried hydrocortisone cream and no relief.     States he was let go from his job related to being out for medical reasons.    Denies headache, cough, sore, throat, difficulty breathing, nausea, abdominal pain, diarrhea, dysuria.     10/14/2022, still having nightsweats and fevers (101 at max) at night. Ok all day but the nighttime is the worst. And shaking chills for the last two days. Feels better during the day when taking ceftriaxone. Intermittent right calf soreness \"leg cramp\", that goes away on its own but returns. Good appetite, but has lost about 15lbs since hospitalization. Denies nausea, vomiting, abdominal pain, diarrhea, dysuria, acute rashes, arthralgia, myalgias.    Seen by Dr. Hahn (10/13) who removed the stitches, performed another aspiration of the right knee. Removed 80ccs of fluid and blood. Spoke with Dr. Hahn today (10/14), who noted that initial right knee aspirate (did not look purulent, but more inflammatory (9/25). He also is concerned about the possibility of a rheumatological etiology for this patient's long history of monoarticular joint effusions acting as a potential risk factor for his recent episode of septic arthritis and ongoing fevers and nightsweats, significantly elevated acute phase reactants, despite appropriate antibiotic therapy and source control.        History of the Infectious Disease lllness:     Thought initially he had food poisoning, ate some leftover food that he kept in his car on a hot day, began to develop symptoms rapidly throughout that day (9/21). That evening developed rigors, chills, fever, nightsweats, vomiting (9/21), then developed pain initially in his left shoulder near the left collar bone (9/22), right knee pain developed (9/23).     Presented to [facility] with several days of right knee " and left-sided chest wall pain and swelling with associated fever (101.3), and recent nausea and vomiting. Blood cultures (9/25), joint aspirate cell count (>300,000 cells), crystal analysis (negative), and cultures were drawn. Started on IV vancomycin and cefepime (9/26). Right knee synovial aspirate was positive with 1+ Strep agalactiae (pan-sensitive) on Day 2 of incubation. Repeat synovial fluid culture (9/26) finalized with no growth to date. Curbside consult (9/27) with Dr. Guthrie, who provided further instructions on infectious workup and antibiotic narrowing. Repeat right knee aspirate (10/2) sent for gram stain, aerobic, and anaerobic culture, with Gram stain negative and repeat cultures finalized with no growth to date after 11 days. Left sternoclavicular aspirate culture grew 1+ Strep agalactiae collected (9/26) and positive on Day 1 of incubation. Daily blood cultures (9/25 - 10/1) finalized with no growth to date. Other negative infectious workup includes: urinalysis, Chlamydia trachomatis/Neisseria gonorrheae PCR, SARS-CoV2 PCR, Influenza A and B PCR, MRSA nares, lyme DNA PCR, Hep B surface Agn, Hep A IgM antibody, Hep C antibody. Hep B surface antibody reactive with >1,000.00.    Right knee s/p I&D (9/26, 9/28, 10/2)  Left Sternoclavicular joint I&D (9/28, 10/2)    Throughout the patient's hospitalization he continued to have daily fevers with a Tmax of 102.9 on 10/1. CRP significantly elevated (309.97) on admission and trended down to 206.4 on discharge (10/5); leukocytosis of 12.5 on admission but resolving (10/4); ESR elevated on admissoion. Developed transaminitis with elevated alk phos and LFTs (9/27), which continued to rise and began trending down after 10/4.      CT  right knee with contrast on 9/25/22 showed Massive knee joint effusion. Mild osteoarthrosis of the patellofemoral joint.  CT sternum SC joints (9/27/22) showed moderate effusion left sternoclavicular joint w/overlying  "soft tissue stranding. No abscess or osteomyelitis  Remaining scans are negative   TTE: negative signs of endocarditis, no valvular vegetations, pericardial effusion. Normal EF      History of monoarticular \"flare ups\" with swelling and pain that will last several days, then resolve on their own. Has had this for about 12 years. Denies any heat to the joint or erythema during these flare ups.      Childhood healthy, played lots of contact sports but denies any major injuries requiring surgery or joint injections.    Grandfather history of gout, DMII. Father hypercholesterolemia. Mom otherwise healthy.    HOME/ENVIRONMENT: house, no concerns for pests or mold    OCCUPATION: Information technology    TRAVEL: born in [place], traveled to [place] in august. No other recent travel outside [place]    OUTDOOR ACTIVITIES:   - Camping: no, but enjoys fishing (June)  - Cave exploring: no   - Walking barefoot on soil or grass: no  - Swimming or wading in rivers, lakes or streams: no this year  - Hot tubs or Jacuzzis: no    ANIMALS: one dog, no farm animal exposure    FOOD/WATER:   - Raw undercooked meat or sushi: eats sushi once a month  - Unwashed fruits or vegetables: yes  - Unpasteurized milk: no  - Well water: no    DENTAL WORK: no    TUBERCULOSIS:   - Known TB exposures: no  - Prior TB testing results: no  - Healthcare work: no : no  - Homelessness: no  - detention: no    TOBACCO/ALCOHOL/RECREATIONAL DRUGS: occasional alcoholic drink    SEXUAL ACTIVITY: yes, one-partner    Review of systems is otherwise negative. Pertinent positives and negatives listed above.      No past medical history on file.    Past Surgical History:   Procedure Laterality Date     ARTHROSCOPY KNEE INCISION AND DRAINAGE Bilateral 9/26/2022    Procedure: Right knee arthroscopic irrigation and debridement, partial synovectomy and left sternoclavicular joint needle aspiration;  Surgeon: Jovani Hahn MD;  Location: [facility] OR     ARTHROSCOPY KNEE IRRIGATION " AND DEBRIDEMENT Right 9/28/2022    Procedure: Right IRRIGATION AND DEBRIDEMENT, KNEE, ARTHROSCOPIC;  Surgeon: Jovani Hahn MD;  Location: [facility] OR     ARTHROSCOPY KNEE IRRIGATION AND DEBRIDEMENT Right 10/2/2022    Procedure: IRRIGATION AND DEBRIDEMENT, KNEE, ARTHROSCOPIC RIGHT;  Surgeon: Zeke Vazquez MD;  Location: [facility] OR     IRRIGATION AND DEBRIDEMENT NECK, COMBINED Left 9/28/2022    Procedure: Open IRRIGATION AND DEBRIDEMENT of Sternal Clavicular Joint Left;  Surgeon: Jovani Hahn MD;  Location: [facility] OR     IRRIGATION AND DEBRIDEMENT SHOULDER, COMBINED Left 10/2/2022    Procedure: Left Sternoclavicular Joint Irrigation And Debridement;  Surgeon: Zeke Vazquez MD;  Location: [facility] OR       No family history on file.    Social History     Social History Narrative     Not on file     Social History     Tobacco Use     Smoking status: Former     Types: Cigarettes     Smokeless tobacco: Never     Tobacco comments:     Minimal smoking       Immunization History   Administered Date(s) Administered     COVID-19,PF,Moderna 04/14/2021, 05/12/2021       Patient Active Problem List   Diagnosis     Left-sided chest wall pain     Effusion of right knee joint     Febrile illness     Septic arthritis of knee, right (H)       No outpatient medications have been marked as taking for the 10/24/22 encounter (Appointment) with Lily Azevedo APRN CNP.       No Known Allergies           Physical Exam:   Vitals were reviewed.  All vitals stable  There were no vitals taken for this visit.  Wt Readings from Last 4 Encounters:   10/24/22 78.5 kg (173 lb)   10/14/22 79.6 kg (175 lb 8 oz)   09/26/22 86.9 kg (191 lb 9.3 oz)       Exam:   GENERAL: well-developed, well-nourished, alert, oriented, in no acute distress. Presented in wheelchair  HEAD: Head is normocephalic, atraumatic   EYES: Eyes have anicteric sclerae.    ENT: Oropharynx is moist without exudates or ulcers.  NECK: Supple.   LUNGS: Clear to  auscultation, no wheezes or crackles  CARDIOVASCULAR: Regular rate and rhythm with no murmurs, gallops or rubs.  SKIN: new left arm excoriated scattered small papules along bicep and elbow. PICC line removed and site healing without erythema. Left sternoclavicular I&D site well-healed, without dehiscence, erythema, drainage, or tenderness. Right knee swollen, hot to touch and with multiple scabs from previous aspirations and arthrocentesis. Photos taken today in image tab. Unable to completely bend knee due to swelling. Still painful to touch.   NEUROLOGIC: Grossly nonfocal         Laboratory Data:     Inflammatory Markers    Recent Labs   Lab Test 10/19/22  1515 10/14/22  1137 10/05/22  0508 10/04/22  0541 10/02/22  0516 10/01/22  0614 09/30/22  0601 09/28/22  0454 09/27/22  0454 09/25/22  1854   SED  --   --  124*  --   --   --   --   --   --  73*   CRP 28.71* 51.40*  --  206.40* 217.99* 222.72* 213.18* 216.87* 269.53* 309.97*       Metabolic Studies    Recent Labs   Lab Test 10/19/22  1515 10/14/22  1137 10/05/22  0508 10/04/22  0541 10/01/22  1522    137 136 136  --    POTASSIUM 4.2 3.9 4.1 4.3  --    CHLORIDE 100 98 96* 98  --    CO2 25 26 27 25  --    ANIONGAP 12 13 13 13  --    BUN 10.9 11.6 13.1 11.6  --    CR 0.80 0.82 0.75 0.72  --    GFRESTIMATED >90 >90 >90 >90  --    * 99 116* 137*  --    JOCELYNE 9.3 9.9 9.2 9.1  --    URIC  --  4.3  --   --   --    LACT  --   --   --   --  1.2   CKT  --   --   --  50  --        Hepatic Studies    Recent Labs   Lab Test 10/19/22  1515 10/14/22  1137 10/05/22  0508 10/04/22  1730   BILITOTAL 0.2 0.4 0.4 0.4   DBIL  --   --   --  <0.20   ALKPHOS 80 120 237* 263*   PROTTOTAL 8.5* 9.4* 8.2 7.9   ALBUMIN 3.5 3.9 3.2* 3.1*   AST 72* 71* 91* 146*   * 119* 241* 284*   LDH  --  297*  --   --        Gout Labs      Recent Labs   Lab Test 10/14/22  1137 09/25/22  1854   URIC 4.3 3.9       Hematology Studies   Recent Labs   Lab Test 10/19/22  1515 10/14/22  1127  10/05/22  0508 10/04/22  0541   WBC 2.7* 3.0* 10.4 10.5   ANEU 0.6*  --   --   --    ANEUTAUTO  --  1.1*  --  7.6   ALYM 1.3  --   --   --    ALYMPAUTO  --  1.4  --  1.5   BLAIR 0.6  --   --   --    AMONOAUTO  --  0.5  --  1.2   AEOS 0.2  --   --   --    AEOSAUTO  --  0.0  --  0.1   ABSBASO  --  0.0  --  0.1   HGB 9.1* 10.4* 9.1* 9.2*   HCT 28.8* 32.4* 27.5* 27.4*    339 793* 740*       Clotting Studies    Recent Labs   Lab Test 10/04/22  1730   INR 1.14       Iron Testing    Recent Labs   Lab Test 10/19/22  1515 10/14/22  1137 10/05/22  0508 10/04/22  1730   IRON  --   --   --  12*   FEB  --   --   --  184*   IRONSAT  --   --   --  7*   RUBY  --  1,838*  --  1,328*   MCV 86  --    < >  --     < > = values in this interval not displayed.         Urine Studies     Recent Labs   Lab Test 10/04/22  1955   URINEPH 7.0   NITRITE Negative   LEUKEST Negative       Medication levels    Recent Labs   Lab Test 09/28/22  2141   VANCOMYCIN <4.0           Microbiology:     Last Culture results   Culture   Date Value Ref Range Status   10/14/2022 No Growth  Final   10/14/2022 No Growth  Final   10/02/2022 No anaerobic organisms isolated  Final   10/02/2022 No Growth  Final   10/01/2022 No Growth  Final   09/30/2022 No Growth  Final   09/30/2022 No Growth  Final   09/29/2022 No Growth  Final   09/28/2022 No Growth  Final   09/27/2022 No Growth  Final   09/26/2022 No anaerobic organisms isolated  Final   09/26/2022 No Growth  Final   09/26/2022 No anaerobic organisms isolated  Final   09/26/2022 (A)  Final    1+ Streptococcus agalactiae (Group B Streptococcus)     Comment:     Susceptibilities done on previous cultures   09/26/2022 No Growth  Final   09/25/2022 (A)  Final    1+ Streptococcus agalactiae (Group B Streptococcus)   09/25/2022 No Growth  Final           Syphilis Testing  Invalid input(s): [id number]      Virology:  Coronavirus-19 testing    Recent Labs   Lab Test 09/25/22  1543   [id number] Negative            Hepatitis B Testing     Recent Labs   Lab Test 10/14/22  1137 10/04/22  1730   AUSAB  --  >1,000.00   HBCAB Nonreactive  --    HEPBANG  --  Nonreactive     Was the last Hepatitis B E antigen positive?   No results found for: HBEAGN     Hepatitis C Antibody   Date Value Ref Range Status   10/04/2022 Nonreactive Nonreactive Final           Imaging:  Results for orders placed or performed during the hospital encounter of 09/25/22   XR Chest 2 Views    Narrative    EXAM: XR CHEST 2 VIEWS  LOCATION: [facility]  DATE/TIME: 9/25/2022 4:18 PM    INDICATION: Left chest and shoulder pain  COMPARISON: None.      Impression    IMPRESSION: Cardiomediastinal silhouette is normal. Normal vasculature. Lungs and pleural spaces are clear. No fracture evident.   XR Knee Right 3 Views    Narrative    EXAM: XR KNEE RIGHT 3 VIEWS  LOCATION: [facility]  DATE/TIME: 9/25/2022 4:16 PM    INDICATION: pain  COMPARISON: None.      Impression    IMPRESSION: Normal joint spaces and alignment. No fracture. Moderate joint effusion.   CT Knee Right w Contrast    Narrative    EXAM: CT KNEE RIGHT W CONTRAST  LOCATION: [facility]  DATE/TIME: 9/25/2022 8:46 PM    INDICATION: Right distal femur anterior swelling and knee swelling. Rule out abscess or other abnormality.  COMPARISON: Radiographs from 9/25/2022.  TECHNIQUE: IV contrast. Axial, sagittal and coronal thin-section reconstruction. Dose reduction techniques were used.   CONTRAST: 100 mL Isovue 370.    FINDINGS:     BONES AND JOINTS:  -There is a massive knee joint effusion. No calcified intra-articular body. No fracture. Mild osteoarthrosis of the patellofemoral joint.    SOFT TISSUES:  -No hematoma, cyst or mass. No gross muscle or tendon pathology. No evidence of abscess.      Impression    IMPRESSION:  1.  Massive knee joint effusion.  2.  Mild osteoarthrosis of the patellofemoral joint.  3.   Otherwise negative.     CT Sternum SC Joints without Contrast    Narrative    EXAM: CT STERNUM SC JOINTS WITHOUT CONTRAST  LOCATION: [facility]  DATE/TIME: 9/27/2022 6:28 PM    INDICATION: Chest pain. Left sternoclavicular joint effusion tapped and positive for Group B Strep, evaluate for extent of septic effusion inflammation.  COMPARISON: None.  TECHNIQUE: With IV contrast. Axial, sagittal and coronal thin-section reconstruction. Dose reduction techniques were used.   CONTRAST: 85 mL Isovue-300 were administered.    FINDINGS:     BONES:  -No fracture. No definite evidence of osteomyelitis. Small accessory ossicle at the cephalad margin of the manubrium. Normal alignment.     SOFT TISSUES:  -Moderate-sized effusion within the left sternoclavicular joint with mild overlying soft tissue swelling and localized soft tissue stranding consistent with the patient's history of septic arthritis. No adenopathy. No discrete fluid collection to suggest   an abscess. Visualized portions of both upper lungs normal. Mediastinum unremarkable. Normal appearing right sternoclavicular joint.      Impression    IMPRESSION:  1.  No evidence of an abscess or osteomyelitis.    2.  Moderate-sized effusion within the left sternoclavicular joint with overlying soft tissue stranding compatible with the history of septic arthritis.     CT Soft tissue neck w contrast*    Narrative    EXAM: CT SOFT TISSUE NECK W CONTRAST  LOCATION: [facility]  DATE/TIME: 10/1/2022 4:25 PM    INDICATION: Recurrent fevers. Polyarticular infectious/inflammatory process, eval for additional abscess or infection  COMPARISON: None.  CONTRAST: 50 mL Isovue 370  TECHNIQUE: Routine CT Soft Tissue Neck with IV contrast. Multiplanar reformats. Dose reduction techniques were used.    FINDINGS:     MUCOSAL SPACES/SOFT TISSUES: Normal mucosal spaces of the upper aerodigestive tract. No mucosal mass or inflammation  identified. Normal vocal cords and infraglottic trachea. Normal parapharyngeal space and  spaces. Normal oral cavity,    spaces, and floor of mouth structures.    LYMPH NODES: Shotty bilateral cervical lymph nodes, none frankly pathologic by size or morphology criteria.     SALIVARY GLANDS: Normal parotid and submandibular glands.    THYROID: Normal.     VESSELS: Vascular structures of the neck are patent.    VISUALIZED INTRACRANIAL/ORBITS/SINUSES: No abnormality of the visualized intracranial compartment or orbits. Visualized paranasal sinuses and mastoid air cells are clear.    OTHER: Left sternoclavicular joint effusion without aggressive/erosive osseous changes. Overlying cutaneous fat stranding and small locules of subcutaneous gas consistent with surgical drainage. Otherwise unremarkable visualized osseous structures. The   included lung apices are clear.      Impression    IMPRESSION:   1.  Persistent left sternoclavicular joint effusion as seen on dedicated CT from 09/27/2022. Changes of interval surgical drainage without evidence for aggressive osseous erosion/destruction.  2.  No additional evidence for an infectious/inflammatory process in the neck.  3.  Nonspecific shotty cervical lymph nodes are presumably reactive in nature.   CT Chest/Abdomen/Pelvis w Contrast    Narrative    EXAM: CT CHEST/ABDOMEN/PELVIS W CONTRAST  LOCATION: [facility]  DATE/TIME: 10/1/2022 4:35 PM    INDICATION: Fever, eval for abscess or infection.  COMPARISON: None.  TECHNIQUE: CT scan of the chest, abdomen, and pelvis was performed following injection of IV contrast. Multiplanar reformats were obtained. Dose reduction techniques were used.   CONTRAST: 50 mL Isovue 370.    FINDINGS:   LUNGS AND PLEURA: Normal.    MEDIASTINUM/AXILLAE: Normal.    CORONARY ARTERY CALCIFICATION: None.    HEPATOBILIARY: Normal.    PANCREAS: Normal.    SPLEEN: Normal.    ADRENAL GLANDS:  Normal.    KIDNEYS/BLADDER: Normal.    BOWEL: Normal.    LYMPH NODES: Normal.    VASCULATURE: Unremarkable.    PELVIC ORGANS: Normal.    MUSCULOSKELETAL: Soft tissue swelling overlying the left sternoclavicular joint with a bubble of air present superficially. This could be due to infection. There is no evidence for osteomyelitis.      Impression    IMPRESSION:  1.  Soft tissue swelling left sternoclavicular joint with some soft tissue air present suspicious for infection. No CT evidence for osteomyelitis.   CT Head w contrast    Narrative    EXAM: CT HEAD W CONTRAST  LOCATION: [facility]  DATE/TIME: 10/1/2022 4:44 PM    INDICATION: Recurrent fevers. Concern for polyarticular septic arthritis. Eval for abscess or infection  COMPARISON: None.  CONTRAST: 25 mL Isovue 370  TECHNIQUE: Routine CT Head with IV contrast.  Dose reduction techniques were used.    FINDINGS:  INTRACRANIAL CONTENTS: No intracranial hemorrhage, extraaxial collection, or mass effect.  No CT evidence of acute infarct. Normal parenchymal attenuation. Normal ventricles and sulci. No pathologic enhancement identified.    VISUALIZED ORBITS/SINUSES/MASTOIDS: No intraorbital abnormality. No paranasal sinus mucosal disease. No middle ear or mastoid effusion.    BONES/SOFT TISSUES: No acute abnormality.      Impression    IMPRESSION:  1.  No evidence for acute intracranial process.   US Lower Extremity Venous Duplex Bilateral    Narrative    EXAM: US LOWER EXTREMITY VENOUS DUPLEX BILATERAL  LOCATION: [facility]  DATE/TIME: 10/1/2022 5:19 PM    INDICATION: Pain and swelling.  COMPARISON: None.  TECHNIQUE: Venous Duplex ultrasound of bilateral lower extremities with and without compression, augmentation and duplex. Color flow and spectral Doppler with waveform analysis performed.    FINDINGS: Exam includes the common femoral, femoral, popliteal veins as well as segmentally visualized deep calf veins  and greater saphenous vein.     RIGHT: No deep vein thrombosis. No superficial thrombophlebitis. No popliteal cyst.    LEFT: No deep vein thrombosis. No superficial thrombophlebitis. No popliteal cyst.      Impression    IMPRESSION:  1.  No deep venous thrombosis in the bilateral lower extremities.   XR Chest Port 1 View    Narrative    XR CHEST PORT 1 VIEW   10/4/2022 1:59 PM     HISTORY: RN placed PICC - verify tip placement    COMPARISON: CT 10/1/2022.      Impression    IMPRESSION: Placement of right upper extremity PICC, tip overlying the  superior vena cava. Normal cardiomediastinal silhouette. Lungs are  clear. No acute bony abnormality.    HELIO ACOSTA MD         SYSTEM ID:  [id number]   US Abdomen Limited    Narrative    EXAM: US ABDOMEN LIMITED  LOCATION: [facility]  DATE/TIME: 10/4/2022 7:23 PM    INDICATION: Increasing liver enzymes. Mild right upper quadrant tenderness.  COMPARISON: CT abdomen pelvis 10/01/2022.  TECHNIQUE: Limited abdominal ultrasound.    FINDINGS:    GALLBLADDER: Normal. No gallstones, wall thickening, or pericholecystic fluid. Negative sonographic Ramos's sign.    BILE DUCTS: No biliary dilatation. The common duct measures 4 mm.    LIVER: Normal parenchyma with smooth contour. No focal mass.    RIGHT KIDNEY: No hydronephrosis.    PANCREAS: The visualized portions are normal.    No ascites.      Impression    IMPRESSION:    1.  Normal limited abdominal ultrasound.   Echocardiogram Complete     Value    LVEF  60-65%    Narrative    [id number]  [id number]  [id number]  [id number]^JOEL^MELANY^MADHU     [facility]  Echocardiography Laboratory  [address] [place] [address]     Name: SEDRICK TONY  MRN: [id number]  : 1990  Study Date: 2022 10:59 AM  Age: 32 yrs  Gender: Male  Patient Location: [facility]  Reason For Study: Endocarditis  Ordering Physician: MELANY MALDONADO  Performed By: Mary Russell RDCS     BSA: 2.1 m2  Height:  72 in  Weight: 191 lb  HR: 99  BP: 119/64 mmHg  ______________________________________________________________________________  Procedure  Complete Portable Echo Adult.  ______________________________________________________________________________  Interpretation Summary     Left ventricular systolic function is normal.  The visual ejection fraction is 60-65%.  The right ventricle is normal in structure, function and size.  Valve structures without significant dysfunction. No apparent valvular  vegetations.  The inferior vena cava was normal in size with preserved respiratory  variability.  There is no pericardial effusion.     No prior study for comparison. Consider LINDA if clnically indicated to assess  for endocarditis.  ______________________________________________________________________________  Left Ventricle  The left ventricle is normal in structure, function and size. Left ventricular  systolic function is normal. The visual ejection fraction is 60-65%. Normal  left ventricular wall motion.     Right Ventricle  The right ventricle is normal in structure, function and size.     Atria  Normal left atrial size. Right atrial size is normal.     Mitral Valve  The mitral valve is normal in structure and function.     Tricuspid Valve  The tricuspid valve is normal in structure and function. Right ventricular  systolic pressure could not be approximated due to inadequate tricuspid  regurgitation.     Aortic Valve  The aortic valve is normal in structure and function.     Pulmonic Valve  The pulmonic valve is normal in structure and function.     Vessels  The aortic root is normal size. Normal size ascending aorta. The inferior vena  cava was normal in size with preserved respiratory variability.     Pericardium  There is no pericardial effusion.     ______________________________________________________________________________  MMode/2D Measurements & Calculations  IVSd: 1.1 cm  LVIDd: 4.7 cm  LVIDs: 2.8  cm  LVPWd: 0.97 cm  FS: 39.5 %     LV mass(C)d: 169.1 grams  LV mass(C)dI: 80.9 grams/m2  Ao root diam: 3.4 cm  LA dimension: 2.9 cm  asc Aorta Diam: 2.9 cm  LA/Ao: 0.84  RWT: 0.41     Doppler Measurements & Calculations  MV E max jerri: 84.9 cm/sec  MV A max jerri: 96.1 cm/sec  MV E/A: 0.88  MV dec time: 0.13 sec  PA acc time: 0.12 sec  E/E' av.5  Lateral E/e': 8.5  Medial E/e': 8.6     ______________________________________________________________________________  Report approved by: Kwaku Mota 2022 02:08 PM               Duplex Ultrasound Right Lower Extremity 10/14/2022    Impression:  1. Acute occlusive DVT of one of the paired posterior tibial veins  from the proximal calf to ankle.     2. The other posterior tibial vein with acute DVT in the mid calf.      TTE 22  Interpretation Summary     Left ventricular systolic function is normal.  The visual ejection fraction is 60-65%.  The right ventricle is normal in structure, function and size.  Valve structures without significant dysfunction. No apparent valvular  vegetations.  The inferior vena cava was normal in size with preserved respiratory  variability.  There is no pericardial effusion.     No prior study for comparison. Consider LINDA if clnically indicated to assess  for endocarditis.  ______________________________________________________________________________  Left Ventricle  The left ventricle is normal in structure, function and size. Left ventricular  systolic function is normal. The visual ejection fraction is 60-65%. Normal  left ventricular wall motion.     Right Ventricle  The right ventricle is normal in structure, function and size.     Atria  Normal left atrial size. Right atrial size is normal.     Mitral Valve  The mitral valve is normal in structure and function.     Tricuspid Valve  The tricuspid valve is normal in structure and function. Right ventricular  systolic pressure could not be approximated due to  inadequate tricuspid  regurgitation.     Aortic Valve  The aortic valve is normal in structure and function.     Pulmonic Valve  The pulmonic valve is normal in structure and function.     Vessels  The aortic root is normal size. Normal size ascending aorta. The inferior vena  cava was normal in size with preserved respiratory variability.     Pericardium  There is no pericardial effusion.    Attestation signed by Radha Munoz DO at 10/25/2022  5:51 AM (Updated):  I discussed case with Lily but did not see patient in person on the day of the encounter. Last seen by us on 10/14/22. This was a close interim follow up.     Arthrocentesis has not yet been performed due to concern for receipt of apixaban (started 10/14/22 for left leg DVT). Last week we transitioned from cefazolin to amoxicillin due to concern for leukopenia. Per Lily's report fevers have resolved over the past 5 days. He had also been prescribed celebrex and taking tylenol. Also developed a rash near PICC line last Thursday. Strart amoxicillin Friday and then developed a a diffuse rash. Also endorses some transient difficulty swallowing. He took benadryl which helped with pruritis. It is difficult to know for sure this was due to the amoxicillin since he had also started celebrex and tramadol recently.   Since his last visit there has been improvement in the LFTs, leukopenia, anemia, ferritin. CRP increased slightly today.    His antibiotic course has been complicated by adverse drug reactions with cefazolin (received for 3 weeks, cytopenias) and amoxicillin (received for 1 week, rash + lip tingling). In addition, he has completed a course for Streptococcus septic arthritis. There was concern about bacteremia since 2 non-contiguous joints were involved. TTE of good visualization during hospitalization was negative for endocarditis.     Will stop amoxicillin. If his fevers continue will plan to repeat blood cultures and TTE. If ongoing fevers with  a negative TTE will pursue LINDA. Requested that Dr. Chatman perform an arthrocentesis due to some warmth appreciated over the knee today. Although not absolutely needed if there is concern about hemarthrosis with arthrocentesis then can consider holding apixaban for 24 hours before the procedure. Will also repeat labs next week with close clinic follow up.  Plan for PCP follow up with week. Will discuss with Rheum if they can see him prior to March 2022.        Again, thank you for allowing me to participate in the care of your patient.      Sincerely,    RESHMA Freitas CNP

## 2022-10-24 NOTE — PROGRESS NOTES
[facility]  Transplant Infectious Disease Clinic Note:  New Patient     Patient:  Sanket Guerrero, Date of birth 1990, Medical record number [id number]  Date of Visit:  10/24/2022  Consult requested by Dr. Braxton for evaluation of spontaneous septic arthritis of native right knee and left sternoclavicular joint.         Assessment and Recommendations:   Recommendations:  1. Stop amoxicillin today  2. Labs today: CBC w/diff, CMP, CRP, Ferritin, and UCHE   3. Repeat blood cultures in 1 week  4. Complete repeat echocardiogram to look for endocarditis (TTE) in two weeks  5. Rheumatology scheduled for March, 2023 with Brittney Barrett PA-C but will communicate directly with Brittney to see if he can be seen sooner.  6. Continue following closely with Dr. Hahn for further therapeutic aspirations; Left voicemail with Dr. Hahn to ask about joint aspiration this week with cultures and cell counts. Will tell patient to hold Eliquis 24 hours prior to procedure  7. Hold Celebrex until after joint aspiration; check with PCP if it is ok to take this with Eliquis  8. Continue Eliquis 10mg PO twice daily for 7 days, and then 5mg twice daily for 3 months, duration will be further determined by PCP; stopped taking aspirin while on Eliquis, use ibuprofen sparingly and at the lowest dose.   9. Follow up with primary care provider for on-going management of DVTl; scheduled this week  10. Follow up 1-2 weeks in-person or virtual with Dr. Munoz          Assessment:    Sanket Guerrero is a 32 year old male with a PMH significant for gout who presents today following a recent hospitalization (9/25/22-10/5/22) for septic arthritis (Strep agalactiae) of his native right knee and left sternoclavicular joint septic arthritis s/p multiple washouts and c/b transaminitis. Discussed this patient with Dr. Munoz, who helped collaborate on the medical decision making on this patient given the complexity of the case.  Patient well-appearing today and reports fevers and chills resolved 5 days ago. Sweats are less frequent but still daily.  Concern for underlying rheumatologic condition that could increase the patient's risk for spontaneous septic arthritis was considered by Dr. Hahn and ourselves. Right knee is still hot and swollen on exam. New rash on left upper bicep and around left elbow where adhesive and sleeve were for PICC. PICC removed. CRP continues to trend down and worsening leukopenia and ongoing anemia (potentially the ceftriaxone).   Rechecking CBC with diff, CMP, CRP, and Ferritin today. Also ordered UCHE; rheum appointment not until March, 2023. In contact with rheum, Brittney Barrett PA-C of Rheumatology, to see if appointment can be sooner. Though TTE and blood cultures negative in hospital, recommend repeated TTE to rule out endocarditis. Patient likely bacteremic at some point with left sternoclavicular and right knee infection with the same organism, making endocarditis a risk in the setting of 3+ weeks of fevers and nightsweats. In light of the new rash and intermittent throat tightness, and >4 weeks treatment for septic arthritis will stop Amoxicillin today and repeat blood cultures in one week. Adding amoxicillin to drug allergy list. Patient also started on Eliquis, Celebrex, and Tramadol and it is difficult to know which medication may have been the cause for rash and pruritus.     Recommend to patient and fiance that they have a low threshold for returning to the ED if any symptoms worsen. I am concerned with the continue systemic symptoms there is a source of infection we have yet to identify versus a rheumatologic process.      Ultrasound right lower extremity 10/14/2022 positive for 2 acute occlusive clots present in the paired posterior tibial veins proximal calf to ankle and then in the other posterior tibial vein in the mid calf. Starting up wthi PCP to managed Eliquis and DVT treatment. Hold  Eliquis until you are seen by PCP and until we know if joint aspirate will occur this week. Stopped ibuprofen while on Eliquis. Concern for increased risk of bleeding with Eliquis and celebrex.     Repeat blood cultures, treponema ABS with reflex to RPR and titer, HIV antigen antibody combo, uric acid, rheumatoid factor, hepatitis B core antibody all negative.       Will continue to plan on close follow up.      I spent 90 minutes as part of a shared visit on the date of the encounter doing chart review, history and exam, documentation.      RESHMA Freitas, CNP  Infectious Diseases  Pager# [phone]           Interval History:   Sanket Guerrero is a 32 year old male with a PMH significant for gout who presents today following a recent hospitalization (9/25/22-10/5/22) for septic arthritis (Strep agalactiae) of his native right knee and left sternoclavicular joint septic arthritis s/p multiple washouts and c/b transaminitis.     10/24/2022: fevers have resolved, over the past 5 days. Nightsweats have improved, less frequent but still daily, last was this early morning. Left leg cramps improved with walking, worse first thing in the AM (on day 10-11 of Eliquis). Right knee, unable to bend the knee fully due to swelling. Pain has improved since starting antibiotics. Occasional intermittent sharp pain in right hip, but goes away by itself, no swelling.    Rash began Thursday on right arm where PICC line sleeve was. Started the Amoxicillin Friday and itching and rash spread across his body, then PICC removed Saturday. Denies any pain or issues with the PICC line. Notes some difficulty swallowing though transient since starting amoxicillin. Tried benadryl two nights ago, which helped with the itching but the hives were persistent. Tried hydrocortisone cream and no relief.     States he was let go from his job related to being out for medical reasons.    Denies headache, cough, sore, throat, difficulty breathing, nausea,  "abdominal pain, diarrhea, dysuria.     10/14/2022, still having nightsweats and fevers (101 at max) at night. Ok all day but the nighttime is the worst. And shaking chills for the last two days. Feels better during the day when taking ceftriaxone. Intermittent right calf soreness \"leg cramp\", that goes away on its own but returns. Good appetite, but has lost about 15lbs since hospitalization. Denies nausea, vomiting, abdominal pain, diarrhea, dysuria, acute rashes, arthralgia, myalgias.    Seen by Dr. Hahn (10/13) who removed the stitches, performed another aspiration of the right knee. Removed 80ccs of fluid and blood. Spoke with Dr. Hahn today (10/14), who noted that initial right knee aspirate (did not look purulent, but more inflammatory (9/25). He also is concerned about the possibility of a rheumatological etiology for this patient's long history of monoarticular joint effusions acting as a potential risk factor for his recent episode of septic arthritis and ongoing fevers and nightsweats, significantly elevated acute phase reactants, despite appropriate antibiotic therapy and source control.        History of the Infectious Disease lllness:     Thought initially he had food poisoning, ate some leftover food that he kept in his car on a hot day, began to develop symptoms rapidly throughout that day (9/21). That evening developed rigors, chills, fever, nightsweats, vomiting (9/21), then developed pain initially in his left shoulder near the left collar bone (9/22), right knee pain developed (9/23).     Presented to [facility] with several days of right knee and left-sided chest wall pain and swelling with associated fever (101.3), and recent nausea and vomiting. Blood cultures (9/25), joint aspirate cell count (>300,000 cells), crystal analysis (negative), and cultures were drawn. Started on IV vancomycin and cefepime (9/26). Right knee synovial aspirate was positive with 1+ " "Strep agalactiae (pan-sensitive) on Day 2 of incubation. Repeat synovial fluid culture (9/26) finalized with no growth to date. Curbside consult (9/27) with Dr. Guthrie, who provided further instructions on infectious workup and antibiotic narrowing. Repeat right knee aspirate (10/2) sent for gram stain, aerobic, and anaerobic culture, with Gram stain negative and repeat cultures finalized with no growth to date after 11 days. Left sternoclavicular aspirate culture grew 1+ Strep agalactiae collected (9/26) and positive on Day 1 of incubation. Daily blood cultures (9/25 - 10/1) finalized with no growth to date. Other negative infectious workup includes: urinalysis, Chlamydia trachomatis/Neisseria gonorrheae PCR, SARS-CoV2 PCR, Influenza A and B PCR, MRSA nares, lyme DNA PCR, Hep B surface Agn, Hep A IgM antibody, Hep C antibody. Hep B surface antibody reactive with >1,000.00.    Right knee s/p I&D (9/26, 9/28, 10/2)  Left Sternoclavicular joint I&D (9/28, 10/2)    Throughout the patient's hospitalization he continued to have daily fevers with a Tmax of 102.9 on 10/1. CRP significantly elevated (309.97) on admission and trended down to 206.4 on discharge (10/5); leukocytosis of 12.5 on admission but resolving (10/4); ESR elevated on admissoion. Developed transaminitis with elevated alk phos and LFTs (9/27), which continued to rise and began trending down after 10/4.      CT  right knee with contrast on 9/25/22 showed Massive knee joint effusion. Mild osteoarthrosis of the patellofemoral joint.  CT sternum SC joints (9/27/22) showed moderate effusion left sternoclavicular joint w/overlying soft tissue stranding. No abscess or osteomyelitis  Remaining scans are negative   TTE: negative signs of endocarditis, no valvular vegetations, pericardial effusion. Normal EF      History of monoarticular \"flare ups\" with swelling and pain that will last several days, then resolve on their own. Has had this for about 12 " years. Denies any heat to the joint or erythema during these flare ups.      Childhood healthy, played lots of contact sports but denies any major injuries requiring surgery or joint injections.    Grandfather history of gout, DMII. Father hypercholesterolemia. Mom otherwise healthy.    HOME/ENVIRONMENT: house, no concerns for pests or mold    OCCUPATION: Information technology    TRAVEL: born in [place], traveled to [place] in august. No other recent travel outside [place]    OUTDOOR ACTIVITIES:   - Camping: no, but enjoys fishing (June)  - Cave exploring: no   - Walking barefoot on soil or grass: no  - Swimming or wading in rivers, lakes or streams: no this year  - Hot tubs or Jacuzzis: no    ANIMALS: one dog, no farm animal exposure    FOOD/WATER:   - Raw undercooked meat or sushi: eats sushi once a month  - Unwashed fruits or vegetables: yes  - Unpasteurized milk: no  - Well water: no    DENTAL WORK: no    TUBERCULOSIS:   - Known TB exposures: no  - Prior TB testing results: no  - Healthcare work: no : no  - Homelessness: no  - senior care: no    TOBACCO/ALCOHOL/RECREATIONAL DRUGS: occasional alcoholic drink    SEXUAL ACTIVITY: yes, one-partner    Review of systems is otherwise negative. Pertinent positives and negatives listed above.      No past medical history on file.    Past Surgical History:   Procedure Laterality Date     ARTHROSCOPY KNEE INCISION AND DRAINAGE Bilateral 9/26/2022    Procedure: Right knee arthroscopic irrigation and debridement, partial synovectomy and left sternoclavicular joint needle aspiration;  Surgeon: Jovani Hahn MD;  Location: [facility] OR     ARTHROSCOPY KNEE IRRIGATION AND DEBRIDEMENT Right 9/28/2022    Procedure: Right IRRIGATION AND DEBRIDEMENT, KNEE, ARTHROSCOPIC;  Surgeon: Jovani Hahn MD;  Location: [facility] OR     ARTHROSCOPY KNEE IRRIGATION AND DEBRIDEMENT Right 10/2/2022    Procedure: IRRIGATION AND DEBRIDEMENT, KNEE, ARTHROSCOPIC RIGHT;  Surgeon: Zeke Vazquez MD;   Location: [facility] OR     IRRIGATION AND DEBRIDEMENT NECK, COMBINED Left 9/28/2022    Procedure: Open IRRIGATION AND DEBRIDEMENT of Sternal Clavicular Joint Left;  Surgeon: Jovani Hahn MD;  Location: [facility] OR     IRRIGATION AND DEBRIDEMENT SHOULDER, COMBINED Left 10/2/2022    Procedure: Left Sternoclavicular Joint Irrigation And Debridement;  Surgeon: Zeke Vazquez MD;  Location: [facility] OR       No family history on file.    Social History     Social History Narrative     Not on file     Social History     Tobacco Use     Smoking status: Former     Types: Cigarettes     Smokeless tobacco: Never     Tobacco comments:     Minimal smoking       Immunization History   Administered Date(s) Administered     COVID-19,PF,Moderna 04/14/2021, 05/12/2021       Patient Active Problem List   Diagnosis     Left-sided chest wall pain     Effusion of right knee joint     Febrile illness     Septic arthritis of knee, right (H)       No outpatient medications have been marked as taking for the 10/24/22 encounter (Appointment) with Lily Azevedo APRN CNP.       No Known Allergies           Physical Exam:   Vitals were reviewed.  All vitals stable  There were no vitals taken for this visit.  Wt Readings from Last 4 Encounters:   10/24/22 78.5 kg (173 lb)   10/14/22 79.6 kg (175 lb 8 oz)   09/26/22 86.9 kg (191 lb 9.3 oz)       Exam:   GENERAL: well-developed, well-nourished, alert, oriented, in no acute distress. Presented in wheelchair  HEAD: Head is normocephalic, atraumatic   EYES: Eyes have anicteric sclerae.    ENT: Oropharynx is moist without exudates or ulcers.  NECK: Supple.   LUNGS: Clear to auscultation, no wheezes or crackles  CARDIOVASCULAR: Regular rate and rhythm with no murmurs, gallops or rubs.  SKIN: new left arm excoriated scattered small papules along bicep and elbow. PICC line removed and site healing without erythema. Left sternoclavicular I&D site well-healed, without dehiscence, erythema, drainage, or  tenderness. Right knee swollen, hot to touch and with multiple scabs from previous aspirations and arthrocentesis. Photos taken today in image tab. Unable to completely bend knee due to swelling. Still painful to touch.   NEUROLOGIC: Grossly nonfocal         Laboratory Data:     Inflammatory Markers    Recent Labs   Lab Test 10/19/22  1515 10/14/22  1137 10/05/22  0508 10/04/22  0541 10/02/22  0516 10/01/22  0614 09/30/22  0601 09/28/22  0454 09/27/22  0454 09/25/22  1854   SED  --   --  124*  --   --   --   --   --   --  73*   CRP 28.71* 51.40*  --  206.40* 217.99* 222.72* 213.18* 216.87* 269.53* 309.97*       Metabolic Studies    Recent Labs   Lab Test 10/19/22  1515 10/14/22  1137 10/05/22  0508 10/04/22  0541 10/01/22  1522    137 136 136  --    POTASSIUM 4.2 3.9 4.1 4.3  --    CHLORIDE 100 98 96* 98  --    CO2 25 26 27 25  --    ANIONGAP 12 13 13 13  --    BUN 10.9 11.6 13.1 11.6  --    CR 0.80 0.82 0.75 0.72  --    GFRESTIMATED >90 >90 >90 >90  --    * 99 116* 137*  --    JOCELYNE 9.3 9.9 9.2 9.1  --    URIC  --  4.3  --   --   --    LACT  --   --   --   --  1.2   CKT  --   --   --  50  --        Hepatic Studies    Recent Labs   Lab Test 10/19/22  1515 10/14/22  1137 10/05/22  0508 10/04/22  1730   BILITOTAL 0.2 0.4 0.4 0.4   DBIL  --   --   --  <0.20   ALKPHOS 80 120 237* 263*   PROTTOTAL 8.5* 9.4* 8.2 7.9   ALBUMIN 3.5 3.9 3.2* 3.1*   AST 72* 71* 91* 146*   * 119* 241* 284*   LDH  --  297*  --   --        Gout Labs      Recent Labs   Lab Test 10/14/22  1137 09/25/22  1854   URIC 4.3 3.9       Hematology Studies   Recent Labs   Lab Test 10/19/22  1515 10/14/22  1127 10/05/22  0508 10/04/22  0541   WBC 2.7* 3.0* 10.4 10.5   ANEU 0.6*  --   --   --    ANEUTAUTO  --  1.1*  --  7.6   ALYM 1.3  --   --   --    ALYMPAUTO  --  1.4  --  1.5   BLAIR 0.6  --   --   --    AMONOAUTO  --  0.5  --  1.2   AEOS 0.2  --   --   --    AEOSAUTO  --  0.0  --  0.1   ABSBASO  --  0.0  --  0.1   HGB 9.1* 10.4* 9.1*  9.2*   HCT 28.8* 32.4* 27.5* 27.4*    339 793* 740*       Clotting Studies    Recent Labs   Lab Test 10/04/22  1730   INR 1.14       Iron Testing    Recent Labs   Lab Test 10/19/22  1515 10/14/22  1137 10/05/22  0508 10/04/22  1730   IRON  --   --   --  12*   FEB  --   --   --  184*   IRONSAT  --   --   --  7*   RUBY  --  1,838*  --  1,328*   MCV 86  --    < >  --     < > = values in this interval not displayed.         Urine Studies     Recent Labs   Lab Test 10/04/22  1955   URINEPH 7.0   NITRITE Negative   LEUKEST Negative       Medication levels    Recent Labs   Lab Test 09/28/22  2141   VANCOMYCIN <4.0           Microbiology:     Last Culture results   Culture   Date Value Ref Range Status   10/14/2022 No Growth  Final   10/14/2022 No Growth  Final   10/02/2022 No anaerobic organisms isolated  Final   10/02/2022 No Growth  Final   10/01/2022 No Growth  Final   09/30/2022 No Growth  Final   09/30/2022 No Growth  Final   09/29/2022 No Growth  Final   09/28/2022 No Growth  Final   09/27/2022 No Growth  Final   09/26/2022 No anaerobic organisms isolated  Final   09/26/2022 No Growth  Final   09/26/2022 No anaerobic organisms isolated  Final   09/26/2022 (A)  Final    1+ Streptococcus agalactiae (Group B Streptococcus)     Comment:     Susceptibilities done on previous cultures   09/26/2022 No Growth  Final   09/25/2022 (A)  Final    1+ Streptococcus agalactiae (Group B Streptococcus)   09/25/2022 No Growth  Final           Syphilis Testing  Invalid input(s): [id number]      Virology:  Coronavirus-19 testing    Recent Labs   Lab Test 09/25/22  1543   [id number] Negative           Hepatitis B Testing     Recent Labs   Lab Test 10/14/22  1137 10/04/22  1730   AUSAB  --  >1,000.00   HBCAB Nonreactive  --    HEPBANG  --  Nonreactive     Was the last Hepatitis B E antigen positive?   No results found for: HBEAGN     Hepatitis C Antibody   Date Value Ref Range Status   10/04/2022 Nonreactive Nonreactive Final            Imaging:  Results for orders placed or performed during the hospital encounter of 09/25/22   XR Chest 2 Views    Narrative    EXAM: XR CHEST 2 VIEWS  LOCATION: [facility]  DATE/TIME: 9/25/2022 4:18 PM    INDICATION: Left chest and shoulder pain  COMPARISON: None.      Impression    IMPRESSION: Cardiomediastinal silhouette is normal. Normal vasculature. Lungs and pleural spaces are clear. No fracture evident.   XR Knee Right 3 Views    Narrative    EXAM: XR KNEE RIGHT 3 VIEWS  LOCATION: [facility]  DATE/TIME: 9/25/2022 4:16 PM    INDICATION: pain  COMPARISON: None.      Impression    IMPRESSION: Normal joint spaces and alignment. No fracture. Moderate joint effusion.   CT Knee Right w Contrast    Narrative    EXAM: CT KNEE RIGHT W CONTRAST  LOCATION: [facility]  DATE/TIME: 9/25/2022 8:46 PM    INDICATION: Right distal femur anterior swelling and knee swelling. Rule out abscess or other abnormality.  COMPARISON: Radiographs from 9/25/2022.  TECHNIQUE: IV contrast. Axial, sagittal and coronal thin-section reconstruction. Dose reduction techniques were used.   CONTRAST: 100 mL Isovue 370.    FINDINGS:     BONES AND JOINTS:  -There is a massive knee joint effusion. No calcified intra-articular body. No fracture. Mild osteoarthrosis of the patellofemoral joint.    SOFT TISSUES:  -No hematoma, cyst or mass. No gross muscle or tendon pathology. No evidence of abscess.      Impression    IMPRESSION:  1.  Massive knee joint effusion.  2.  Mild osteoarthrosis of the patellofemoral joint.  3.  Otherwise negative.     CT Sternum SC Joints without Contrast    Narrative    EXAM: CT STERNUM SC JOINTS WITHOUT CONTRAST  LOCATION: [facility]  DATE/TIME: 9/27/2022 6:28 PM    INDICATION: Chest pain. Left sternoclavicular joint effusion tapped and positive for Group B Strep, evaluate for extent of septic effusion  inflammation.  COMPARISON: None.  TECHNIQUE: With IV contrast. Axial, sagittal and coronal thin-section reconstruction. Dose reduction techniques were used.   CONTRAST: 85 mL Isovue-300 were administered.    FINDINGS:     BONES:  -No fracture. No definite evidence of osteomyelitis. Small accessory ossicle at the cephalad margin of the manubrium. Normal alignment.     SOFT TISSUES:  -Moderate-sized effusion within the left sternoclavicular joint with mild overlying soft tissue swelling and localized soft tissue stranding consistent with the patient's history of septic arthritis. No adenopathy. No discrete fluid collection to suggest   an abscess. Visualized portions of both upper lungs normal. Mediastinum unremarkable. Normal appearing right sternoclavicular joint.      Impression    IMPRESSION:  1.  No evidence of an abscess or osteomyelitis.    2.  Moderate-sized effusion within the left sternoclavicular joint with overlying soft tissue stranding compatible with the history of septic arthritis.     CT Soft tissue neck w contrast*    Narrative    EXAM: CT SOFT TISSUE NECK W CONTRAST  LOCATION: [facility]  DATE/TIME: 10/1/2022 4:25 PM    INDICATION: Recurrent fevers. Polyarticular infectious/inflammatory process, eval for additional abscess or infection  COMPARISON: None.  CONTRAST: 50 mL Isovue 370  TECHNIQUE: Routine CT Soft Tissue Neck with IV contrast. Multiplanar reformats. Dose reduction techniques were used.    FINDINGS:     MUCOSAL SPACES/SOFT TISSUES: Normal mucosal spaces of the upper aerodigestive tract. No mucosal mass or inflammation identified. Normal vocal cords and infraglottic trachea. Normal parapharyngeal space and  spaces. Normal oral cavity,    spaces, and floor of mouth structures.    LYMPH NODES: Shotty bilateral cervical lymph nodes, none frankly pathologic by size or morphology criteria.     SALIVARY GLANDS: Normal parotid and submandibular  glands.    THYROID: Normal.     VESSELS: Vascular structures of the neck are patent.    VISUALIZED INTRACRANIAL/ORBITS/SINUSES: No abnormality of the visualized intracranial compartment or orbits. Visualized paranasal sinuses and mastoid air cells are clear.    OTHER: Left sternoclavicular joint effusion without aggressive/erosive osseous changes. Overlying cutaneous fat stranding and small locules of subcutaneous gas consistent with surgical drainage. Otherwise unremarkable visualized osseous structures. The   included lung apices are clear.      Impression    IMPRESSION:   1.  Persistent left sternoclavicular joint effusion as seen on dedicated CT from 09/27/2022. Changes of interval surgical drainage without evidence for aggressive osseous erosion/destruction.  2.  No additional evidence for an infectious/inflammatory process in the neck.  3.  Nonspecific shotty cervical lymph nodes are presumably reactive in nature.   CT Chest/Abdomen/Pelvis w Contrast    Narrative    EXAM: CT CHEST/ABDOMEN/PELVIS W CONTRAST  LOCATION: [facility]  DATE/TIME: 10/1/2022 4:35 PM    INDICATION: Fever, eval for abscess or infection.  COMPARISON: None.  TECHNIQUE: CT scan of the chest, abdomen, and pelvis was performed following injection of IV contrast. Multiplanar reformats were obtained. Dose reduction techniques were used.   CONTRAST: 50 mL Isovue 370.    FINDINGS:   LUNGS AND PLEURA: Normal.    MEDIASTINUM/AXILLAE: Normal.    CORONARY ARTERY CALCIFICATION: None.    HEPATOBILIARY: Normal.    PANCREAS: Normal.    SPLEEN: Normal.    ADRENAL GLANDS: Normal.    KIDNEYS/BLADDER: Normal.    BOWEL: Normal.    LYMPH NODES: Normal.    VASCULATURE: Unremarkable.    PELVIC ORGANS: Normal.    MUSCULOSKELETAL: Soft tissue swelling overlying the left sternoclavicular joint with a bubble of air present superficially. This could be due to infection. There is no evidence for osteomyelitis.      Impression     IMPRESSION:  1.  Soft tissue swelling left sternoclavicular joint with some soft tissue air present suspicious for infection. No CT evidence for osteomyelitis.   CT Head w contrast    Narrative    EXAM: CT HEAD W CONTRAST  LOCATION: [facility]  DATE/TIME: 10/1/2022 4:44 PM    INDICATION: Recurrent fevers. Concern for polyarticular septic arthritis. Eval for abscess or infection  COMPARISON: None.  CONTRAST: 25 mL Isovue 370  TECHNIQUE: Routine CT Head with IV contrast.  Dose reduction techniques were used.    FINDINGS:  INTRACRANIAL CONTENTS: No intracranial hemorrhage, extraaxial collection, or mass effect.  No CT evidence of acute infarct. Normal parenchymal attenuation. Normal ventricles and sulci. No pathologic enhancement identified.    VISUALIZED ORBITS/SINUSES/MASTOIDS: No intraorbital abnormality. No paranasal sinus mucosal disease. No middle ear or mastoid effusion.    BONES/SOFT TISSUES: No acute abnormality.      Impression    IMPRESSION:  1.  No evidence for acute intracranial process.   US Lower Extremity Venous Duplex Bilateral    Narrative    EXAM: US LOWER EXTREMITY VENOUS DUPLEX BILATERAL  LOCATION: [facility]  DATE/TIME: 10/1/2022 5:19 PM    INDICATION: Pain and swelling.  COMPARISON: None.  TECHNIQUE: Venous Duplex ultrasound of bilateral lower extremities with and without compression, augmentation and duplex. Color flow and spectral Doppler with waveform analysis performed.    FINDINGS: Exam includes the common femoral, femoral, popliteal veins as well as segmentally visualized deep calf veins and greater saphenous vein.     RIGHT: No deep vein thrombosis. No superficial thrombophlebitis. No popliteal cyst.    LEFT: No deep vein thrombosis. No superficial thrombophlebitis. No popliteal cyst.      Impression    IMPRESSION:  1.  No deep venous thrombosis in the bilateral lower extremities.   XR Chest Port 1 View    Narrative    XR CHEST PORT 1  VIEW   10/4/2022 1:59 PM     HISTORY: RN placed PICC - verify tip placement    COMPARISON: CT 10/1/2022.      Impression    IMPRESSION: Placement of right upper extremity PICC, tip overlying the  superior vena cava. Normal cardiomediastinal silhouette. Lungs are  clear. No acute bony abnormality.    HELIO ACOSTA MD         SYSTEM ID:  [id number]   US Abdomen Limited    Narrative    EXAM: US ABDOMEN LIMITED  LOCATION: [facility]  DATE/TIME: 10/4/2022 7:23 PM    INDICATION: Increasing liver enzymes. Mild right upper quadrant tenderness.  COMPARISON: CT abdomen pelvis 10/01/2022.  TECHNIQUE: Limited abdominal ultrasound.    FINDINGS:    GALLBLADDER: Normal. No gallstones, wall thickening, or pericholecystic fluid. Negative sonographic Ramos's sign.    BILE DUCTS: No biliary dilatation. The common duct measures 4 mm.    LIVER: Normal parenchyma with smooth contour. No focal mass.    RIGHT KIDNEY: No hydronephrosis.    PANCREAS: The visualized portions are normal.    No ascites.      Impression    IMPRESSION:    1.  Normal limited abdominal ultrasound.   Echocardiogram Complete     Value    LVEF  60-65%    Narrative    [id number]  [id number]  [id number]  [id number]^JOEL^MELANY^MADHU     [facility]  Echocardiography Laboratory  [address] [place] [address]     Name: SEDRICK TONY  MRN: [id number]  : 1990  Study Date: 2022 10:59 AM  Age: 32 yrs  Gender: Male  Patient Location: [facility]  Reason For Study: Endocarditis  Ordering Physician: MELANY MALDONADO  Performed By: Mary Russell RDCS     BSA: 2.1 m2  Height: 72 in  Weight: 191 lb  HR: 99  BP: 119/64 mmHg  ______________________________________________________________________________  Procedure  Complete Portable Echo Adult.  ______________________________________________________________________________  Interpretation Summary     Left ventricular systolic function is normal.  The visual ejection fraction  is 60-65%.  The right ventricle is normal in structure, function and size.  Valve structures without significant dysfunction. No apparent valvular  vegetations.  The inferior vena cava was normal in size with preserved respiratory  variability.  There is no pericardial effusion.     No prior study for comparison. Consider LINDA if clnically indicated to assess  for endocarditis.  ______________________________________________________________________________  Left Ventricle  The left ventricle is normal in structure, function and size. Left ventricular  systolic function is normal. The visual ejection fraction is 60-65%. Normal  left ventricular wall motion.     Right Ventricle  The right ventricle is normal in structure, function and size.     Atria  Normal left atrial size. Right atrial size is normal.     Mitral Valve  The mitral valve is normal in structure and function.     Tricuspid Valve  The tricuspid valve is normal in structure and function. Right ventricular  systolic pressure could not be approximated due to inadequate tricuspid  regurgitation.     Aortic Valve  The aortic valve is normal in structure and function.     Pulmonic Valve  The pulmonic valve is normal in structure and function.     Vessels  The aortic root is normal size. Normal size ascending aorta. The inferior vena  cava was normal in size with preserved respiratory variability.     Pericardium  There is no pericardial effusion.     ______________________________________________________________________________  MMode/2D Measurements & Calculations  IVSd: 1.1 cm  LVIDd: 4.7 cm  LVIDs: 2.8 cm  LVPWd: 0.97 cm  FS: 39.5 %     LV mass(C)d: 169.1 grams  LV mass(C)dI: 80.9 grams/m2  Ao root diam: 3.4 cm  LA dimension: 2.9 cm  asc Aorta Diam: 2.9 cm  LA/Ao: 0.84  RWT: 0.41     Doppler Measurements & Calculations  MV E max jerri: 84.9 cm/sec  MV A max jerri: 96.1 cm/sec  MV E/A: 0.88  MV dec time: 0.13 sec  PA acc time: 0.12 sec  E/E' av.5  Lateral  E/e': 8.5  Medial E/e': 8.6     ______________________________________________________________________________  Report approved by: Kwaku Mota 09/28/2022 02:08 PM               Duplex Ultrasound Right Lower Extremity 10/14/2022    Impression:  1. Acute occlusive DVT of one of the paired posterior tibial veins  from the proximal calf to ankle.     2. The other posterior tibial vein with acute DVT in the mid calf.      TTE 9/28/22  Interpretation Summary     Left ventricular systolic function is normal.  The visual ejection fraction is 60-65%.  The right ventricle is normal in structure, function and size.  Valve structures without significant dysfunction. No apparent valvular  vegetations.  The inferior vena cava was normal in size with preserved respiratory  variability.  There is no pericardial effusion.     No prior study for comparison. Consider LINDA if clnically indicated to assess  for endocarditis.  ______________________________________________________________________________  Left Ventricle  The left ventricle is normal in structure, function and size. Left ventricular  systolic function is normal. The visual ejection fraction is 60-65%. Normal  left ventricular wall motion.     Right Ventricle  The right ventricle is normal in structure, function and size.     Atria  Normal left atrial size. Right atrial size is normal.     Mitral Valve  The mitral valve is normal in structure and function.     Tricuspid Valve  The tricuspid valve is normal in structure and function. Right ventricular  systolic pressure could not be approximated due to inadequate tricuspid  regurgitation.     Aortic Valve  The aortic valve is normal in structure and function.     Pulmonic Valve  The pulmonic valve is normal in structure and function.     Vessels  The aortic root is normal size. Normal size ascending aorta. The inferior vena  cava was normal in size with preserved respiratory variability.     Pericardium  There is  no pericardial effusion.

## 2022-10-25 ENCOUNTER — TELEPHONE (OUTPATIENT)
Dept: RHEUMATOLOGY | Facility: CLINIC | Age: 32
End: 2022-10-25

## 2022-10-25 DIAGNOSIS — D70.9 NEUTROPENIA, UNSPECIFIED TYPE (H): Primary | ICD-10-CM

## 2022-10-25 DIAGNOSIS — M00.261 STREPTOCOCCAL ARTHRITIS OF RIGHT KNEE (H): Primary | ICD-10-CM

## 2022-10-25 NOTE — TELEPHONE ENCOUNTER
I received a message from patient's primary care provider requesting an earlier appointment with me. Please offer 11/22/22 at 2pm. Please let me know if that time does not work for the patient and I will find another spot.    Brittney Barrett PA-C on 10/25/2022 at 10:30 AM

## 2022-10-26 ENCOUNTER — TELEPHONE (OUTPATIENT)
Dept: INFECTIOUS DISEASES | Facility: CLINIC | Age: 32
End: 2022-10-26

## 2022-10-26 ENCOUNTER — OFFICE VISIT (OUTPATIENT)
Dept: FAMILY MEDICINE | Facility: CLINIC | Age: 32
End: 2022-10-26
Attending: REGISTERED NURSE
Payer: COMMERCIAL

## 2022-10-26 VITALS
RESPIRATION RATE: 16 BRPM | HEART RATE: 115 BPM | BODY MASS INDEX: 23.33 KG/M2 | WEIGHT: 172 LBS | DIASTOLIC BLOOD PRESSURE: 74 MMHG | OXYGEN SATURATION: 99 % | SYSTOLIC BLOOD PRESSURE: 122 MMHG | TEMPERATURE: 99.2 F

## 2022-10-26 DIAGNOSIS — R79.89 ELEVATED LFTS: ICD-10-CM

## 2022-10-26 DIAGNOSIS — R50.9 FEVER AND CHILLS: ICD-10-CM

## 2022-10-26 DIAGNOSIS — M00.261 STREPTOCOCCAL ARTHRITIS OF RIGHT KNEE (H): ICD-10-CM

## 2022-10-26 DIAGNOSIS — I82.441 ACUTE DEEP VEIN THROMBOSIS (DVT) OF TIBIAL VEIN OF RIGHT LOWER EXTREMITY (H): ICD-10-CM

## 2022-10-26 DIAGNOSIS — Z01.818 PREOP GENERAL PHYSICAL EXAM: Primary | ICD-10-CM

## 2022-10-26 DIAGNOSIS — Z87.39 HISTORY OF JOINT EFFUSION: ICD-10-CM

## 2022-10-26 LAB — ANA SER QL IF: NEGATIVE

## 2022-10-26 PROCEDURE — 99214 OFFICE O/P EST MOD 30 MIN: CPT | Performed by: NURSE PRACTITIONER

## 2022-10-26 ASSESSMENT — PAIN SCALES - GENERAL: PAINLEVEL: NO PAIN (0)

## 2022-10-26 NOTE — TELEPHONE ENCOUNTER
10/26/2022  8:51 AM    Spoke with Dr. Hahn's nurse and they plan on doing the arthrocentesis this Friday and collecting cultures and sending the aspirate for cell count. They instructed us to have Sanket hold his Eliquis for 24 hours prior to the procedure.     Called Sanket to let him know about the plan for Friday and to continue holding the Celebrex and to stop the Eliquis 24 hours prior to his procedure with Dr. Hahn.     RESHMA Freitas, CNP  Infectious Diseases

## 2022-10-26 NOTE — TELEPHONE ENCOUNTER
[facility] Call Center    Phone Message    May a detailed message be left on voicemail: yes     Reason for Call: Other: Patient wants to switch visit from virtual visit to in person. Patient asked  to reach out to MD team to see if this would be possible. Patient wants to keep appointment on 11/03    Action Taken: Other: ID    Travel Screening: Not Applicable

## 2022-10-26 NOTE — PROGRESS NOTES
"  Assessment & Plan     Fever and chills  -following infectious disease, still having mild low grade fevers, Sanket states that he was advised by infectious disease doctor to call their clinic if fevers worsened, or get over 100.4F      Streptococcal arthritis of right knee (H)  -was treated with Cefazolin, but then transitioned from cefazolin to amoxicillin due to concern for leukopenia. WBC stale, plan to repeat CBC in Friday. He did not tolerate Amoxicillin-developed a diffuse rash, was unclear if rash was due to side effects from Amoxicillin since he also took Celebrex, I recommended to avoid Celebrex since patient is currently on Eliquis for treatment of provoked R LE DVT  -liver enzymes improving, recommended to repeat with other labs on Friday   - Comprehensive metabolic panel (BMP + Alb, Alk Phos, ALT, AST, Total. Bili, TP); Future  - Ferritin; Future  -TTE of good visualization during hospitalization was negative for endocarditis. Plan to repeat again, ordered by infectious disease   -scheduled for arthrocentesis, advised to hold Eliquis 24 hrs before procedure  -was referred to rheumatology, his appointment scheduled in March 2022, per ID note \"plan to discuss with Rheum if they can see him prior to March 2022.\"     Elevated LFTs  -improving  -repeat CMP on Friday       History of joint effusion  -following infectious disease doctor and scheduled for right knee arthrocentesis    Acute deep vein thrombosis (DVT) of tibial vein of right lower extremity (H)  -provoked  -continue Eliquis for total 3 months     Pre-op general physical  -patient is cleared for I&D procedure on 11/03, right knee open irrigation and debridement surgery.  No prior history of anesthesia complications, he developed DVT in his right LE, but this was provoked, he is currently on Eliquis, will continue for total 3 months. He recently had heart ECHO done, left ventricular systolic function is normal.  The visual ejection fraction is " "Chief Complaint   Patient presents with     Well Child     2 month       Initial Pulse 120   Temp 98.9  F (37.2  C) (Tympanic)   Resp 26   Ht 0.559 m (1' 10\")   Wt 4.763 kg (10 lb 8 oz)   HC 38.1 cm (15\")   BMI 15.25 kg/m   Estimated body mass index is 15.25 kg/m  as calculated from the following:    Height as of this encounter: 0.559 m (1' 10\").    Weight as of this encounter: 4.763 kg (10 lb 8 oz).  Medication Reconciliation: complete    Kayley Caballero LPN  " 60-65%.  The right ventricle is normal in structure, function and size.  Valve structures without significant dysfunction. No apparent valvular  vegetations  -he will hold Eliquis 24 hrs before surgery     Return in about 2 days (around 10/28/2022) for Lab Work.    RESHMA Lepe CNP  [facility] TERI Coles is a 32 year old patient, presenting for the following health issues:  RECHECK      [facility]       Hospital Follow-up Visit:    Hospital/Nursing Home/IP Rehab Facility: [facility]  Date of Admission: 9/25/22  Date of Discharge: 10/5/22  Reason(s) for Admission: Septic arthritis of knee, right     Was your hospitalization related to COVID-19? No   Problems taking medications regularly:  None  Medication changes since discharge: none   Problems adhering to non-medication therapy:  None    Summary of hospitalization:  [facility] discharge summary reviewed  Diagnostic Tests/Treatments reviewed.  Follow up needed: ID, ortho, rheumatology  Other Healthcare Providers Involved in Patient s Care:         Imaging and Procedures  Update since discharge: improved.     Plan of care communicated with patient         Review of Systems   Constitutional, HEENT, cardiovascular, pulmonary, gi and gu systems are negative, except as otherwise noted.      Objective    /74 (BP Location: Left arm, Patient Position: Sitting, Cuff Size: Adult Large)   Pulse 115   Temp 99.2  F (37.3  C) (Tympanic)   Resp 16   Wt 78 kg (172 lb)   SpO2 99%   BMI 23.33 kg/m    Body mass index is 23.33 kg/m .  Physical Exam   GENERAL: healthy, alert and no distress  EYES: Eyes grossly normal to inspection, PERRL and conjunctivae and sclerae normal  RESP: lungs clear to auscultation - no rales, rhonchi or wheezes  CV: regular rate and rhythm, normal S1 S2, no S3 or S4, no murmur, click or rub, no peripheral edema and peripheral pulses strong  MS: right knee moderate  edema, tender and warm to tough  NEURO: Normal strength and tone, mentation intact and speech normal  PSYCH: mentation appears normal, affect normal/bright

## 2022-10-28 ENCOUNTER — TELEPHONE (OUTPATIENT)
Dept: INFECTIOUS DISEASES | Facility: CLINIC | Age: 32
End: 2022-10-28

## 2022-10-28 ENCOUNTER — LAB (OUTPATIENT)
Dept: LAB | Facility: CLINIC | Age: 32
End: 2022-10-28
Payer: COMMERCIAL

## 2022-10-28 DIAGNOSIS — R50.9 FEVER AND CHILLS: ICD-10-CM

## 2022-10-28 DIAGNOSIS — D70.9 NEUTROPENIA, UNSPECIFIED TYPE (H): ICD-10-CM

## 2022-10-28 DIAGNOSIS — Z87.39 HISTORY OF JOINT EFFUSION: ICD-10-CM

## 2022-10-28 DIAGNOSIS — R79.89 ELEVATED LFTS: ICD-10-CM

## 2022-10-28 DIAGNOSIS — M00.261 STREPTOCOCCAL ARTHRITIS OF RIGHT KNEE (H): ICD-10-CM

## 2022-10-28 LAB
ALBUMIN SERPL BCG-MCNC: 3.9 G/DL (ref 3.5–5.2)
ALP SERPL-CCNC: 64 U/L (ref 40–129)
ALT SERPL W P-5'-P-CCNC: 46 U/L (ref 10–50)
ANION GAP SERPL CALCULATED.3IONS-SCNC: 11 MMOL/L (ref 7–15)
AST SERPL W P-5'-P-CCNC: 29 U/L (ref 10–50)
BASOPHILS # BLD AUTO: 0 10E3/UL (ref 0–0.2)
BASOPHILS NFR BLD AUTO: 1 %
BILIRUB SERPL-MCNC: 0.3 MG/DL
BUN SERPL-MCNC: 9.4 MG/DL (ref 6–20)
CALCIUM SERPL-MCNC: 9.7 MG/DL (ref 8.6–10)
CHLORIDE SERPL-SCNC: 97 MMOL/L (ref 98–107)
CREAT SERPL-MCNC: 0.9 MG/DL (ref 0.67–1.17)
CRP SERPL-MCNC: 17.09 MG/L
DEPRECATED HCO3 PLAS-SCNC: 28 MMOL/L (ref 22–29)
EOSINOPHIL # BLD AUTO: 0.2 10E3/UL (ref 0–0.7)
EOSINOPHIL NFR BLD AUTO: 3 %
ERYTHROCYTE [DISTWIDTH] IN BLOOD BY AUTOMATED COUNT: 14.1 % (ref 10–15)
FERRITIN SERPL-MCNC: 599 NG/ML (ref 31–409)
GFR SERPL CREATININE-BSD FRML MDRD: >90 ML/MIN/1.73M2
GLUCOSE SERPL-MCNC: 102 MG/DL (ref 70–99)
HCT VFR BLD AUTO: 35.5 % (ref 40–53)
HGB BLD-MCNC: 11 G/DL (ref 13.3–17.7)
LYMPHOCYTES # BLD AUTO: 1.9 10E3/UL (ref 0.8–5.3)
LYMPHOCYTES NFR BLD AUTO: 34 %
MCH RBC QN AUTO: 26.7 PG (ref 26.5–33)
MCHC RBC AUTO-ENTMCNC: 31 G/DL (ref 31.5–36.5)
MCV RBC AUTO: 86 FL (ref 78–100)
MONOCYTES # BLD AUTO: 0.8 10E3/UL (ref 0–1.3)
MONOCYTES NFR BLD AUTO: 14 %
NEUTROPHILS # BLD AUTO: 2.8 10E3/UL (ref 1.6–8.3)
NEUTROPHILS NFR BLD AUTO: 48 %
PLATELET # BLD AUTO: 398 10E3/UL (ref 150–450)
POTASSIUM SERPL-SCNC: 3.8 MMOL/L (ref 3.4–5.3)
PROT SERPL-MCNC: 9.3 G/DL (ref 6.4–8.3)
RBC # BLD AUTO: 4.12 10E6/UL (ref 4.4–5.9)
SODIUM SERPL-SCNC: 136 MMOL/L (ref 136–145)
WBC # BLD AUTO: 5.7 10E3/UL (ref 4–11)

## 2022-10-28 PROCEDURE — 82728 ASSAY OF FERRITIN: CPT

## 2022-10-28 PROCEDURE — 87075 CULTR BACTERIA EXCEPT BLOOD: CPT

## 2022-10-28 PROCEDURE — 87070 CULTURE OTHR SPECIMN AEROBIC: CPT

## 2022-10-28 PROCEDURE — 85025 COMPLETE CBC W/AUTO DIFF WBC: CPT

## 2022-10-28 PROCEDURE — 86140 C-REACTIVE PROTEIN: CPT

## 2022-10-28 PROCEDURE — 36415 COLL VENOUS BLD VENIPUNCTURE: CPT

## 2022-10-28 PROCEDURE — 89060 EXAM SYNOVIAL FLUID CRYSTALS: CPT

## 2022-10-28 PROCEDURE — 87040 BLOOD CULTURE FOR BACTERIA: CPT | Mod: 59

## 2022-10-28 PROCEDURE — 80053 COMPREHEN METABOLIC PANEL: CPT

## 2022-10-28 NOTE — TELEPHONE ENCOUNTER
10/28/2022  12:24 PM    Spoke with Dr. Hahn who performed a joint aspiration of Sanket's right knee today, which he states was mostly coagulated blood. He is concerned there is a hematoma at this point that will require surgical intervention under anesthesia. Plan to have patient hold his Eliquis over the weekend in the case that Dr. Hahn is able to operate on Monday. Dr. Hahn plans on reaching out to the patient tomorrow about the final plan for when surgery will be scheduled. He will have the patient resume his Eliquis if surgery will occur at a date later next week.     Spoke with Sanket and his fiance. Instructed him to hold the Eliquis through the weekend unless he hears differently from Dr. Hahn tomorrow. They had some questions about risk regarding the surgery and I deferred their questions to Dr. Hahn when he calls tomorrow. Certainly with a low white blood count and neutropenia we are concerned about increased risk of post-surgical infection. We will plan on continuing to follow Sanket closely. He is heading in today to have repeat labs drawn, will keep an eye out for these.     RESHMA Freitas, CNP  Infectious Diseases  Pager# [phone] and [facility]

## 2022-10-29 LAB — CRYSTALS SNV MICRO: NORMAL

## 2022-11-02 LAB
BACTERIA BLD CULT: NO GROWTH
BACTERIA BLD CULT: NO GROWTH
BACTERIA SNV CULT: NO GROWTH

## 2022-11-03 ENCOUNTER — ANESTHESIA EVENT (OUTPATIENT)
Dept: SURGERY | Facility: CLINIC | Age: 32
End: 2022-11-03
Payer: COMMERCIAL

## 2022-11-03 ENCOUNTER — HOSPITAL ENCOUNTER (OUTPATIENT)
Facility: CLINIC | Age: 32
Discharge: HOME | End: 2022-11-06
Attending: ORTHOPAEDIC SURGERY | Admitting: ORTHOPAEDIC SURGERY
Payer: COMMERCIAL

## 2022-11-03 ENCOUNTER — VIRTUAL VISIT (OUTPATIENT)
Dept: INFECTIOUS DISEASES | Facility: CLINIC | Age: 32
End: 2022-11-03
Attending: REGISTERED NURSE
Payer: COMMERCIAL

## 2022-11-03 ENCOUNTER — ANESTHESIA (OUTPATIENT)
Dept: SURGERY | Facility: CLINIC | Age: 32
End: 2022-11-03
Payer: COMMERCIAL

## 2022-11-03 DIAGNOSIS — R50.9 FEVER AND CHILLS: ICD-10-CM

## 2022-11-03 DIAGNOSIS — M25.461 EFFUSION OF RIGHT KNEE JOINT: Primary | ICD-10-CM

## 2022-11-03 DIAGNOSIS — Z87.39 HISTORY OF JOINT EFFUSION: ICD-10-CM

## 2022-11-03 DIAGNOSIS — M00.261 STREPTOCOCCAL ARTHRITIS OF RIGHT KNEE (H): ICD-10-CM

## 2022-11-03 DIAGNOSIS — M00.261 STREPTOCOCCAL ARTHRITIS OF RIGHT KNEE (H): Primary | ICD-10-CM

## 2022-11-03 DIAGNOSIS — D70.9 NEUTROPENIA, UNSPECIFIED TYPE (H): ICD-10-CM

## 2022-11-03 DIAGNOSIS — I82.441 ACUTE DEEP VEIN THROMBOSIS (DVT) OF TIBIAL VEIN OF RIGHT LOWER EXTREMITY (H): ICD-10-CM

## 2022-11-03 LAB — SARS-COV-2 RNA RESP QL NAA+PROBE: NEGATIVE

## 2022-11-03 PROCEDURE — 250N000011 HC RX IP 250 OP 636: Performed by: NURSE ANESTHETIST, CERTIFIED REGISTERED

## 2022-11-03 PROCEDURE — 272N000001 HC OR GENERAL SUPPLY STERILE: Performed by: ORTHOPAEDIC SURGERY

## 2022-11-03 PROCEDURE — G0463 HOSPITAL OUTPT CLINIC VISIT: HCPCS | Mod: PN,RTG | Performed by: REGISTERED NURSE

## 2022-11-03 PROCEDURE — 999N000141 HC STATISTIC PRE-PROCEDURE NURSING ASSESSMENT: Performed by: ORTHOPAEDIC SURGERY

## 2022-11-03 PROCEDURE — 258N000003 HC RX IP 258 OP 636: Performed by: ORTHOPAEDIC SURGERY

## 2022-11-03 PROCEDURE — 710N000010 HC RECOVERY PHASE 1, LEVEL 2, PER MIN: Performed by: ORTHOPAEDIC SURGERY

## 2022-11-03 PROCEDURE — 99214 OFFICE O/P EST MOD 30 MIN: CPT | Performed by: FAMILY MEDICINE

## 2022-11-03 PROCEDURE — 250N000009 HC RX 250: Performed by: ANESTHESIOLOGY

## 2022-11-03 PROCEDURE — 250N000013 HC RX MED GY IP 250 OP 250 PS 637: Performed by: ANESTHESIOLOGY

## 2022-11-03 PROCEDURE — 99215 OFFICE O/P EST HI 40 MIN: CPT | Mod: 95 | Performed by: REGISTERED NURSE

## 2022-11-03 PROCEDURE — 87075 CULTR BACTERIA EXCEPT BLOOD: CPT | Performed by: ORTHOPAEDIC SURGERY

## 2022-11-03 PROCEDURE — 250N000009 HC RX 250: Performed by: NURSE ANESTHETIST, CERTIFIED REGISTERED

## 2022-11-03 PROCEDURE — 370N000017 HC ANESTHESIA TECHNICAL FEE, PER MIN: Performed by: ORTHOPAEDIC SURGERY

## 2022-11-03 PROCEDURE — 258N000003 HC RX IP 258 OP 636: Performed by: NURSE ANESTHETIST, CERTIFIED REGISTERED

## 2022-11-03 PROCEDURE — 360N000075 HC SURGERY LEVEL 2, PER MIN: Performed by: ORTHOPAEDIC SURGERY

## 2022-11-03 PROCEDURE — 87176 TISSUE HOMOGENIZATION CULTR: CPT | Performed by: ORTHOPAEDIC SURGERY

## 2022-11-03 PROCEDURE — 87070 CULTURE OTHR SPECIMN AEROBIC: CPT | Performed by: ORTHOPAEDIC SURGERY

## 2022-11-03 PROCEDURE — 87205 SMEAR GRAM STAIN: CPT | Performed by: ORTHOPAEDIC SURGERY

## 2022-11-03 PROCEDURE — U0005 INFEC AGEN DETEC AMPLI PROBE: HCPCS | Performed by: ORTHOPAEDIC SURGERY

## 2022-11-03 PROCEDURE — 250N000013 HC RX MED GY IP 250 OP 250 PS 637: Performed by: ORTHOPAEDIC SURGERY

## 2022-11-03 PROCEDURE — 250N000011 HC RX IP 250 OP 636: Performed by: ANESTHESIOLOGY

## 2022-11-03 RX ORDER — AMOXICILLIN 250 MG
1 CAPSULE ORAL 2 TIMES DAILY
Status: DISCONTINUED | OUTPATIENT
Start: 2022-11-03 | End: 2022-11-06 | Stop reason: HOSPADM

## 2022-11-03 RX ORDER — DEXAMETHASONE SODIUM PHOSPHATE 10 MG/ML
INJECTION, SOLUTION INTRAMUSCULAR; INTRAVENOUS PRN
Status: DISCONTINUED | OUTPATIENT
Start: 2022-11-03 | End: 2022-11-03

## 2022-11-03 RX ORDER — SODIUM CHLORIDE, SODIUM LACTATE, POTASSIUM CHLORIDE, CALCIUM CHLORIDE 600; 310; 30; 20 MG/100ML; MG/100ML; MG/100ML; MG/100ML
INJECTION, SOLUTION INTRAVENOUS CONTINUOUS
Status: DISCONTINUED | OUTPATIENT
Start: 2022-11-03 | End: 2022-11-03 | Stop reason: HOSPADM

## 2022-11-03 RX ORDER — LIDOCAINE 40 MG/G
CREAM TOPICAL
Status: DISCONTINUED | OUTPATIENT
Start: 2022-11-03 | End: 2022-11-06 | Stop reason: HOSPADM

## 2022-11-03 RX ORDER — LIDOCAINE 40 MG/G
CREAM TOPICAL
Status: DISCONTINUED | OUTPATIENT
Start: 2022-11-03 | End: 2022-11-03 | Stop reason: HOSPADM

## 2022-11-03 RX ORDER — OXYCODONE HYDROCHLORIDE 5 MG/1
10 TABLET ORAL EVERY 4 HOURS PRN
Status: DISCONTINUED | OUTPATIENT
Start: 2022-11-03 | End: 2022-11-06 | Stop reason: HOSPADM

## 2022-11-03 RX ORDER — PROPOFOL 10 MG/ML
INJECTION, EMULSION INTRAVENOUS CONTINUOUS PRN
Status: DISCONTINUED | OUTPATIENT
Start: 2022-11-03 | End: 2022-11-03

## 2022-11-03 RX ORDER — CEFAZOLIN SODIUM 1 G/3ML
INJECTION, POWDER, FOR SOLUTION INTRAMUSCULAR; INTRAVENOUS PRN
Status: DISCONTINUED | OUTPATIENT
Start: 2022-11-03 | End: 2022-11-03

## 2022-11-03 RX ORDER — ONDANSETRON 4 MG/1
4 TABLET, ORALLY DISINTEGRATING ORAL EVERY 30 MIN PRN
Status: DISCONTINUED | OUTPATIENT
Start: 2022-11-03 | End: 2022-11-03 | Stop reason: HOSPADM

## 2022-11-03 RX ORDER — BUPIVACAINE HYDROCHLORIDE 5 MG/ML
INJECTION, SOLUTION EPIDURAL; INTRACAUDAL PRN
Status: DISCONTINUED | OUTPATIENT
Start: 2022-11-03 | End: 2022-11-03

## 2022-11-03 RX ORDER — ONDANSETRON 2 MG/ML
4 INJECTION INTRAMUSCULAR; INTRAVENOUS EVERY 6 HOURS PRN
Status: DISCONTINUED | OUTPATIENT
Start: 2022-11-03 | End: 2022-11-06 | Stop reason: HOSPADM

## 2022-11-03 RX ORDER — CLINDAMYCIN PHOSPHATE 900 MG/50ML
900 INJECTION, SOLUTION INTRAVENOUS SEE ADMIN INSTRUCTIONS
Status: DISCONTINUED | OUTPATIENT
Start: 2022-11-03 | End: 2022-11-03

## 2022-11-03 RX ORDER — OXYCODONE HYDROCHLORIDE 5 MG/1
5 TABLET ORAL EVERY 4 HOURS PRN
Status: DISCONTINUED | OUTPATIENT
Start: 2022-11-03 | End: 2022-11-06 | Stop reason: HOSPADM

## 2022-11-03 RX ORDER — ONDANSETRON 2 MG/ML
INJECTION INTRAMUSCULAR; INTRAVENOUS PRN
Status: DISCONTINUED | OUTPATIENT
Start: 2022-11-03 | End: 2022-11-03

## 2022-11-03 RX ORDER — CLINDAMYCIN PHOSPHATE 900 MG/50ML
900 INJECTION, SOLUTION INTRAVENOUS
Status: DISCONTINUED | OUTPATIENT
Start: 2022-11-03 | End: 2022-11-03

## 2022-11-03 RX ORDER — ONDANSETRON 4 MG/1
4 TABLET, ORALLY DISINTEGRATING ORAL EVERY 6 HOURS PRN
Status: DISCONTINUED | OUTPATIENT
Start: 2022-11-03 | End: 2022-11-06 | Stop reason: HOSPADM

## 2022-11-03 RX ORDER — PROPOFOL 10 MG/ML
INJECTION, EMULSION INTRAVENOUS PRN
Status: DISCONTINUED | OUTPATIENT
Start: 2022-11-03 | End: 2022-11-03

## 2022-11-03 RX ORDER — NALOXONE HYDROCHLORIDE 0.4 MG/ML
0.4 INJECTION, SOLUTION INTRAMUSCULAR; INTRAVENOUS; SUBCUTANEOUS
Status: DISCONTINUED | OUTPATIENT
Start: 2022-11-03 | End: 2022-11-06 | Stop reason: HOSPADM

## 2022-11-03 RX ORDER — ACETAMINOPHEN 325 MG/1
650 TABLET ORAL EVERY 4 HOURS PRN
Status: DISCONTINUED | OUTPATIENT
Start: 2022-11-06 | End: 2022-11-06 | Stop reason: HOSPADM

## 2022-11-03 RX ORDER — PROCHLORPERAZINE MALEATE 10 MG
10 TABLET ORAL EVERY 6 HOURS PRN
Status: DISCONTINUED | OUTPATIENT
Start: 2022-11-03 | End: 2022-11-06 | Stop reason: HOSPADM

## 2022-11-03 RX ORDER — ONDANSETRON 2 MG/ML
4 INJECTION INTRAMUSCULAR; INTRAVENOUS EVERY 30 MIN PRN
Status: DISCONTINUED | OUTPATIENT
Start: 2022-11-03 | End: 2022-11-03 | Stop reason: HOSPADM

## 2022-11-03 RX ORDER — VANCOMYCIN HYDROCHLORIDE 1 G/20ML
1 INJECTION, POWDER, LYOPHILIZED, FOR SOLUTION INTRAVENOUS ONCE
Status: DISCONTINUED | OUTPATIENT
Start: 2022-11-03 | End: 2022-11-03 | Stop reason: HOSPADM

## 2022-11-03 RX ORDER — SODIUM CHLORIDE, SODIUM LACTATE, POTASSIUM CHLORIDE, CALCIUM CHLORIDE 600; 310; 30; 20 MG/100ML; MG/100ML; MG/100ML; MG/100ML
INJECTION, SOLUTION INTRAVENOUS CONTINUOUS
Status: DISCONTINUED | OUTPATIENT
Start: 2022-11-03 | End: 2022-11-04

## 2022-11-03 RX ORDER — FENTANYL CITRATE 0.05 MG/ML
INJECTION, SOLUTION INTRAMUSCULAR; INTRAVENOUS PRN
Status: DISCONTINUED | OUTPATIENT
Start: 2022-11-03 | End: 2022-11-03

## 2022-11-03 RX ORDER — NALOXONE HYDROCHLORIDE 0.4 MG/ML
0.2 INJECTION, SOLUTION INTRAMUSCULAR; INTRAVENOUS; SUBCUTANEOUS
Status: DISCONTINUED | OUTPATIENT
Start: 2022-11-03 | End: 2022-11-06 | Stop reason: HOSPADM

## 2022-11-03 RX ORDER — POLYETHYLENE GLYCOL 3350 17 G/17G
17 POWDER, FOR SOLUTION ORAL DAILY
Status: DISCONTINUED | OUTPATIENT
Start: 2022-11-04 | End: 2022-11-06 | Stop reason: HOSPADM

## 2022-11-03 RX ORDER — OXYCODONE HYDROCHLORIDE 5 MG/1
5 TABLET ORAL EVERY 4 HOURS PRN
Status: DISCONTINUED | OUTPATIENT
Start: 2022-11-03 | End: 2022-11-03 | Stop reason: HOSPADM

## 2022-11-03 RX ORDER — CEFAZOLIN SODIUM 1 G/3ML
1 INJECTION, POWDER, FOR SOLUTION INTRAMUSCULAR; INTRAVENOUS EVERY 8 HOURS
Status: COMPLETED | OUTPATIENT
Start: 2022-11-04 | End: 2022-11-04

## 2022-11-03 RX ORDER — HYDROMORPHONE HCL IN WATER/PF 6 MG/30 ML
0.4 PATIENT CONTROLLED ANALGESIA SYRINGE INTRAVENOUS
Status: DISCONTINUED | OUTPATIENT
Start: 2022-11-03 | End: 2022-11-06 | Stop reason: HOSPADM

## 2022-11-03 RX ORDER — MEPERIDINE HYDROCHLORIDE 50 MG/ML
12.5 INJECTION INTRAMUSCULAR; INTRAVENOUS; SUBCUTANEOUS EVERY 10 MIN PRN
Status: DISCONTINUED | OUTPATIENT
Start: 2022-11-03 | End: 2022-11-03 | Stop reason: HOSPADM

## 2022-11-03 RX ORDER — HYDROXYZINE HYDROCHLORIDE 25 MG/1
25 TABLET, FILM COATED ORAL EVERY 6 HOURS PRN
Status: DISCONTINUED | OUTPATIENT
Start: 2022-11-03 | End: 2022-11-06 | Stop reason: HOSPADM

## 2022-11-03 RX ORDER — FENTANYL CITRATE 50 UG/ML
25 INJECTION, SOLUTION INTRAMUSCULAR; INTRAVENOUS EVERY 5 MIN PRN
Status: DISCONTINUED | OUTPATIENT
Start: 2022-11-03 | End: 2022-11-03 | Stop reason: HOSPADM

## 2022-11-03 RX ORDER — BISACODYL 10 MG
10 SUPPOSITORY, RECTAL RECTAL DAILY PRN
Status: DISCONTINUED | OUTPATIENT
Start: 2022-11-03 | End: 2022-11-06 | Stop reason: HOSPADM

## 2022-11-03 RX ORDER — HYDROMORPHONE HCL IN WATER/PF 6 MG/30 ML
0.2 PATIENT CONTROLLED ANALGESIA SYRINGE INTRAVENOUS EVERY 5 MIN PRN
Status: DISCONTINUED | OUTPATIENT
Start: 2022-11-03 | End: 2022-11-03 | Stop reason: HOSPADM

## 2022-11-03 RX ORDER — TRANEXAMIC ACID 100 MG/ML
INJECTION, SOLUTION INTRAVENOUS ONCE
Status: COMPLETED | OUTPATIENT
Start: 2022-11-03 | End: 2022-11-03

## 2022-11-03 RX ORDER — SODIUM CHLORIDE, SODIUM LACTATE, POTASSIUM CHLORIDE, CALCIUM CHLORIDE 600; 310; 30; 20 MG/100ML; MG/100ML; MG/100ML; MG/100ML
INJECTION, SOLUTION INTRAVENOUS CONTINUOUS PRN
Status: DISCONTINUED | OUTPATIENT
Start: 2022-11-03 | End: 2022-11-03

## 2022-11-03 RX ORDER — TRANEXAMIC ACID 100 MG/ML
INJECTION, SOLUTION INTRAVENOUS
Status: DISCONTINUED
Start: 2022-11-03 | End: 2022-11-03 | Stop reason: HOSPADM

## 2022-11-03 RX ORDER — ACETAMINOPHEN 325 MG/1
975 TABLET ORAL EVERY 8 HOURS
Status: COMPLETED | OUTPATIENT
Start: 2022-11-03 | End: 2022-11-06

## 2022-11-03 RX ORDER — HYDROMORPHONE HCL IN WATER/PF 6 MG/30 ML
0.2 PATIENT CONTROLLED ANALGESIA SYRINGE INTRAVENOUS
Status: DISCONTINUED | OUTPATIENT
Start: 2022-11-03 | End: 2022-11-06 | Stop reason: HOSPADM

## 2022-11-03 RX ADMIN — PROPOFOL 50 MG: 10 INJECTION, EMULSION INTRAVENOUS at 15:58

## 2022-11-03 RX ADMIN — FENTANYL CITRATE 50 MCG: 0.05 INJECTION, SOLUTION INTRAMUSCULAR; INTRAVENOUS at 16:00

## 2022-11-03 RX ADMIN — SODIUM CHLORIDE, POTASSIUM CHLORIDE, SODIUM LACTATE AND CALCIUM CHLORIDE: 600; 310; 30; 20 INJECTION, SOLUTION INTRAVENOUS at 16:49

## 2022-11-03 RX ADMIN — MEPERIDINE HYDROCHLORIDE 12.5 MG: 50 INJECTION INTRAMUSCULAR; INTRAVENOUS; SUBCUTANEOUS at 18:13

## 2022-11-03 RX ADMIN — CEFAZOLIN 2 G: 1 INJECTION, POWDER, FOR SOLUTION INTRAMUSCULAR; INTRAVENOUS at 15:52

## 2022-11-03 RX ADMIN — TRANEXAMIC ACID 2 G: 1 INJECTION, SOLUTION INTRAVENOUS at 16:56

## 2022-11-03 RX ADMIN — DEXAMETHASONE SODIUM PHOSPHATE 10 MG: 10 INJECTION, SOLUTION INTRAMUSCULAR; INTRAVENOUS at 17:59

## 2022-11-03 RX ADMIN — FENTANYL CITRATE 50 MCG: 0.05 INJECTION, SOLUTION INTRAMUSCULAR; INTRAVENOUS at 17:33

## 2022-11-03 RX ADMIN — FENTANYL CITRATE 100 MCG: 0.05 INJECTION, SOLUTION INTRAMUSCULAR; INTRAVENOUS at 15:43

## 2022-11-03 RX ADMIN — SENNOSIDES AND DOCUSATE SODIUM 1 TABLET: 50; 8.6 TABLET ORAL at 21:31

## 2022-11-03 RX ADMIN — BUPIVACAINE HYDROCHLORIDE 20 ML: 5 INJECTION, SOLUTION EPIDURAL; INTRACAUDAL; PERINEURAL at 18:02

## 2022-11-03 RX ADMIN — HYDROMORPHONE HYDROCHLORIDE 0.5 MG: 1 INJECTION, SOLUTION INTRAMUSCULAR; INTRAVENOUS; SUBCUTANEOUS at 16:39

## 2022-11-03 RX ADMIN — SODIUM CHLORIDE, POTASSIUM CHLORIDE, SODIUM LACTATE AND CALCIUM CHLORIDE: 600; 310; 30; 20 INJECTION, SOLUTION INTRAVENOUS at 21:32

## 2022-11-03 RX ADMIN — SODIUM CHLORIDE, POTASSIUM CHLORIDE, SODIUM LACTATE AND CALCIUM CHLORIDE: 600; 310; 30; 20 INJECTION, SOLUTION INTRAVENOUS at 14:51

## 2022-11-03 RX ADMIN — PROPOFOL 200 MG: 10 INJECTION, EMULSION INTRAVENOUS at 15:43

## 2022-11-03 RX ADMIN — ONDANSETRON 4 MG: 2 INJECTION INTRAMUSCULAR; INTRAVENOUS at 17:59

## 2022-11-03 RX ADMIN — HYDROMORPHONE HYDROCHLORIDE 0.5 MG: 1 INJECTION, SOLUTION INTRAMUSCULAR; INTRAVENOUS; SUBCUTANEOUS at 17:11

## 2022-11-03 RX ADMIN — OXYCODONE HYDROCHLORIDE 5 MG: 5 TABLET ORAL at 18:39

## 2022-11-03 RX ADMIN — FENTANYL CITRATE 50 MCG: 0.05 INJECTION, SOLUTION INTRAMUSCULAR; INTRAVENOUS at 16:59

## 2022-11-03 RX ADMIN — ACETAMINOPHEN 975 MG: 325 TABLET, FILM COATED ORAL at 21:30

## 2022-11-03 RX ADMIN — PROPOFOL 120 MCG/KG/MIN: 10 INJECTION, EMULSION INTRAVENOUS at 15:44

## 2022-11-03 RX ADMIN — MIDAZOLAM 2 MG: 1 INJECTION INTRAMUSCULAR; INTRAVENOUS at 15:40

## 2022-11-03 ASSESSMENT — ACTIVITIES OF DAILY LIVING (ADL)
ADLS_ACUITY_SCORE: 36

## 2022-11-03 NOTE — LETTER
11/3/2022       RE: Sanket Guerrero  [address] [facility] [address]     Dear Colleague,    Thank you for referring your patient, Sanket Guerrero, to the [facility] INFECTIOUS DISEASE CLINIC [place] at [facility]. Please see a copy of my visit note below.    [facility]  Transplant Infectious Disease Clinic Note:  Follow up video visit     Patient:  Sanket Guerrero, Date of birth 1990, Medical record number [id number]  Date of Visit:  11/03/2022  Consult requested by Dr. Braxton for evaluation of spontaneous septic arthritis of native right knee and left sternoclavicular joint.    Video start: 9:54 AM  Video end: 10:16 AM  Via [employer]         Assessment and Recommendations:   Recommendations:  1. Follow up with Rheumatology appointment with Brittney Barrett PA-C on 11/22/22  2. Follow up with Dr. Hahn and your primary care provider on when to restart Eliquis for DVT  3. TTE is ordered in the chart and will be available when you check in for surgery today  4. Follow up as needed; please contact our clinic to schedule an appointment  5. Have a low threshold for heading to the Emergency Department if you are feeling worse in any way  6. Watch for signs of post-surgical infection including: redness around surgical incision, the incision opening up and or draining fluid that is foul-smell or purulent      Assessment:    Sanket Guerrero is a 32 year old male with a PMH significant for gout who presents today following a recent hospitalization (9/25/22-10/5/22) for septic arthritis (Strep agalactiae) of his native right knee and left sternoclavicular joint septic arthritis s/p multiple washouts and c/b transaminitis. Concern for underlying rheumatologic condition that could increase the patient's risk for spontaneous septic arthritis was considered by Dr. Hahn and ourselves.     Continues to have intermittent low-grade temperatures of 99.3  with nightly sweats. Higher fevers and nightsweats. Most recent labs show CRP trending down, an ferritin that continues to be elevated; potentially related to right knee hematoma vs underlying autoimmune inflammatory process. Normal white blood cell count; neutropenia potentially related to IV ceftriaxone now resolved. Anemia also resolving and perhaps related to ceftriaxone. Adding ceftriaxone as a possible cause of cytopenia to Sanket's allergy list. Blood cultures and synovial fluid aspirate cultures (noted by surgeon to be a small sample size d/t coagulated blood) from 10/28 with no growth to date. Other rheumatologic and infectious work up negative. Considered that his initial daily fevers and drenching nightsweats (throughout hospitalization and outpatient) despite appropriate targeted therapy for his septic arthritis might be related to a drug-fever in the setting of IV ceftriaxone. Patient will follow up with Rheumatology in the near future to undergo a thorough workup for underlying autoimmune condition that may have predisposed him to being at higher risk for spontaneous septic arthritis of two non contiguous joints. Will repeat TTE to ensure no endocarditis under the suspicion that the patient was likely bacteremic prior to hospitalization. Order is placed and he may have this performed while he is hospitalized overnight for his right knee surgery today.      Ultrasound right lower extremity 10/14/2022 positive for 2 acute occlusive clots present in the paired posterior tibial veins proximal calf to ankle and then in the other posterior tibial vein in the mid calf. Continuing with PCP for management of DVT with Eliquis. Continue to use ibuprofen or Celebrex sparingly while on Eliquis. Concern for increased risk of bleeding with Eliquis and Celebrex or ibuprofen.       Recommend to patient and fiance that they have a low threshold for returning to the ED if any symptoms worsen. I am concerned with the  continue systemic symptoms there is a source of infection we have yet to identify versus a rheumatologic process.     Sanket is set to have right open-knee surgery with Dr. Hahn today at 2pm to remove the coagulated blood. Left a voicemail with Dr. Hahn's nurse that if there is any synovial fluid to sample, we would like aerobic and anaerobic cultures along with a cell count and analysis.    Sanket is feeling much improved, with labs returning to normal, and repeat cultures negative. He has been antibiotic free for over a week without worsening of his symptoms. His infection is clear from our standpoint and does not need regular follow up. He may schedule to follow up with us at any point if he has future concerns. We will contact him if anything abnormal returns with the repeat TTE.     I spent 64 minutes as part of a video visit on the date of the encounter doing chart review, history and exam, documentation.        RESHMA Freitas, CNP  Infectious Diseases  Pager# [phone]           Interval History:   Sanket Guerrero is a 32 year old male with a PMH significant for gout who presents today following a recent hospitalization (9/25/22-10/5/22) for septic arthritis (Strep agalactiae) of his native right knee and left sternoclavicular joint septic arthritis s/p multiple washouts and c/b transaminitis.     11/3/22: Very infrequent fevers (99.3F max), daily nightsweats but not drenching, no chills. Still having pruritus with both arms, right knee with small red bumps. Hasn't improved since stopping the Amoxicillin. Rash/hives improve with a damp cloth. Put Elliquis on hold two days ago.      10/24/2022: fevers have resolved, over the past 5 days. Nightsweats have improved, less frequent but still daily, last was this early morning. Left leg cramps improved with walking, worse first thing in the AM (on day 10-11 of Eliquis). Right knee, unable to bend the knee fully due to swelling. Pain has improved since starting  "antibiotics. Occasional intermittent sharp pain in right hip, but goes away by itself, no swelling.    Rash began Thursday on right arm where PICC line sleeve was. Started the Amoxicillin Friday and itching and rash spread across his body, then PICC removed Saturday. Denies any pain or issues with the PICC line. Notes some difficulty swallowing though transient since starting amoxicillin. Tried benadryl two nights ago, which helped with the itching but the hives were persistent. Tried hydrocortisone cream and no relief.     States he was let go from his job related to being out for medical reasons.    Denies headache, cough, sore, throat, difficulty breathing, nausea, abdominal pain, diarrhea, dysuria.     10/14/2022, still having nightsweats and fevers (101 at max) at night. Ok all day but the nighttime is the worst. And shaking chills for the last two days. Feels better during the day when taking ceftriaxone. Intermittent right calf soreness \"leg cramp\", that goes away on its own but returns. Good appetite, but has lost about 15lbs since hospitalization. Denies nausea, vomiting, abdominal pain, diarrhea, dysuria, acute rashes, arthralgia, myalgias.    Seen by Dr. Hahn (10/13) who removed the stitches, performed another aspiration of the right knee. Removed 80ccs of fluid and blood. Spoke with Dr. Hahn today (10/14), who noted that initial right knee aspirate (did not look purulent, but more inflammatory (9/25). He also is concerned about the possibility of a rheumatological etiology for this patient's long history of monoarticular joint effusions acting as a potential risk factor for his recent episode of septic arthritis and ongoing fevers and nightsweats, significantly elevated acute phase reactants, despite appropriate antibiotic therapy and source control.        History of the Infectious Disease lllness:     Thought initially he had food poisoning, ate some leftover food that he kept in his car on a " hot day, began to develop symptoms rapidly throughout that day (9/21). That evening developed rigors, chills, fever, nightsweats, vomiting (9/21), then developed pain initially in his left shoulder near the left collar bone (9/22), right knee pain developed (9/23).     Presented to [facility] with several days of right knee and left-sided chest wall pain and swelling with associated fever (101.3), and recent nausea and vomiting. Blood cultures (9/25), joint aspirate cell count (>300,000 cells), crystal analysis (negative), and cultures were drawn. Started on IV vancomycin and cefepime (9/26). Right knee synovial aspirate was positive with 1+ Strep agalactiae (pan-sensitive) on Day 2 of incubation. Repeat synovial fluid culture (9/26) finalized with no growth to date. Curbside consult (9/27) with Dr. Guthrie, who provided further instructions on infectious workup and antibiotic narrowing. Repeat right knee aspirate (10/2) sent for gram stain, aerobic, and anaerobic culture, with Gram stain negative and repeat cultures finalized with no growth to date after 11 days. Left sternoclavicular aspirate culture grew 1+ Strep agalactiae collected (9/26) and positive on Day 1 of incubation. Daily blood cultures (9/25 - 10/1) finalized with no growth to date. Other negative infectious workup includes: urinalysis, Chlamydia trachomatis/Neisseria gonorrheae PCR, SARS-CoV2 PCR, Influenza A and B PCR, MRSA nares, lyme DNA PCR, Hep B surface Agn, Hep A IgM antibody, Hep C antibody. Hep B surface antibody reactive with >1,000.00.    Right knee s/p I&D (9/26, 9/28, 10/2)  Left Sternoclavicular joint I&D (9/28, 10/2)    Throughout the patient's hospitalization he continued to have daily fevers with a Tmax of 102.9 on 10/1. CRP significantly elevated (309.97) on admission and trended down to 206.4 on discharge (10/5); leukocytosis of 12.5 on admission but resolving (10/4); ESR elevated on  "admissoion. Developed transaminitis with elevated alk phos and LFTs (9/27), which continued to rise and began trending down after 10/4.      CT  right knee with contrast on 9/25/22 showed Massive knee joint effusion. Mild osteoarthrosis of the patellofemoral joint.  CT sternum SC joints (9/27/22) showed moderate effusion left sternoclavicular joint w/overlying soft tissue stranding. No abscess or osteomyelitis  Remaining scans are negative   TTE: negative signs of endocarditis, no valvular vegetations, pericardial effusion. Normal EF      History of monoarticular \"flare ups\" with swelling and pain that will last several days, then resolve on their own. Has had this for about 12 years. Denies any heat to the joint or erythema during these flare ups.      Childhood healthy, played lots of contact sports but denies any major injuries requiring surgery or joint injections.    Grandfather history of gout, DMII. Father hypercholesterolemia. Mom otherwise healthy.    HOME/ENVIRONMENT: house, no concerns for pests or mold    OCCUPATION: Information technology    TRAVEL: born in [place], traveled to [place] in august. No other recent travel outside [place]    OUTDOOR ACTIVITIES:   - Camping: no, but enjoys fishing (June)  - Cave exploring: no   - Walking barefoot on soil or grass: no  - Swimming or wading in rivers, lakes or streams: no this year  - Hot tubs or Jacuzzis: no    ANIMALS: one dog, no farm animal exposure    FOOD/WATER:   - Raw undercooked meat or sushi: eats sushi once a month  - Unwashed fruits or vegetables: yes  - Unpasteurized milk: no  - Well water: no    DENTAL WORK: no    TUBERCULOSIS:   - Known TB exposures: no  - Prior TB testing results: no  - Healthcare work: no : no  - Homelessness: no  - FCI: no    TOBACCO/ALCOHOL/RECREATIONAL DRUGS: occasional alcoholic drink    SEXUAL ACTIVITY: yes, one-partner    Review of systems is otherwise negative. Pertinent positives and negatives listed above.      Past " Medical History:   Diagnosis Date     Chronic infection        Past Surgical History:   Procedure Laterality Date     ARTHROSCOPY KNEE INCISION AND DRAINAGE Bilateral 9/26/2022    Procedure: Right knee arthroscopic irrigation and debridement, partial synovectomy and left sternoclavicular joint needle aspiration;  Surgeon: Jovani Hahn MD;  Location: [facility] OR     ARTHROSCOPY KNEE IRRIGATION AND DEBRIDEMENT Right 9/28/2022    Procedure: Right IRRIGATION AND DEBRIDEMENT, KNEE, ARTHROSCOPIC;  Surgeon: Jovani Hahn MD;  Location: [facility] OR     ARTHROSCOPY KNEE IRRIGATION AND DEBRIDEMENT Right 10/2/2022    Procedure: IRRIGATION AND DEBRIDEMENT, KNEE, ARTHROSCOPIC RIGHT;  Surgeon: Zeke Vazquez MD;  Location: [facility] OR     IRRIGATION AND DEBRIDEMENT NECK, COMBINED Left 9/28/2022    Procedure: Open IRRIGATION AND DEBRIDEMENT of Sternal Clavicular Joint Left;  Surgeon: Jovani Hahn MD;  Location: [facility] OR     IRRIGATION AND DEBRIDEMENT SHOULDER, COMBINED Left 10/2/2022    Procedure: Left Sternoclavicular Joint Irrigation And Debridement;  Surgeon: Zeke Vazquez MD;  Location: [facility] OR       No family history on file.    Social History     Social History Narrative     Not on file     Social History     Tobacco Use     Smoking status: Former     Types: Cigarettes     Smokeless tobacco: Never     Tobacco comments:     Minimal smoking   Substance Use Topics     Alcohol use: Not Currently     Drug use: Never       Immunization History   Administered Date(s) Administered     COVID-19,PF,Moderna 04/14/2021, 05/12/2021       Patient Active Problem List   Diagnosis     Left-sided chest wall pain     Effusion of right knee joint     Febrile illness     Septic arthritis of knee, right (H)       No outpatient medications have been marked as taking for the 11/3/22 encounter (Appointment) with Lily Azevedo APRN CNP.       Allergies   Allergen Reactions     Amoxicillin Rash     Unsure if amoxicillin was the  cause, started multiple other medications around the day he started this medication. Also endorsed some intermittent throat tightening and difficulty swallowing.               Physical Exam:   Vitals were reviewed.  All vitals stable  There were no vitals taken for this visit.  Wt Readings from Last 4 Encounters:   10/26/22 78 kg (172 lb)   10/24/22 78.5 kg (173 lb)   10/14/22 79.6 kg (175 lb 8 oz)   09/26/22 86.9 kg (191 lb 9.3 oz)     No vitals taken; this was a video visit  Exam:   GENERAL: Healthy, alert and no distress  EYES: Eyes grossly normal to inspection.  No discharge or erythema, or obvious scleral/conjunctival abnormalities.  RESP: No audible wheeze, cough, or visible cyanosis.  No visible retractions or increased work of breathing.    SKIN: Visible skin clear. No significant rash, abnormal pigmentation or lesions.  NEURO: Cranial nerves grossly intact.  Mentation and speech appropriate for age.  PSYCH: Mentation appears normal, affect normal/bright, judgement and insight intact, normal speech and appearance well-groomed.               Laboratory Data:     Inflammatory Markers    Recent Labs   Lab Test 10/28/22  1340 10/24/22  1637 10/19/22  1515 10/14/22  1137 10/05/22  0508 10/04/22  0541 10/02/22  0516 10/01/22  0614 09/30/22  0601 09/27/22  0454 09/25/22  1854   SED  --   --   --   --  124*  --   --   --   --   --  73*   CRP 17.09* 35.20* 28.71* 51.40*  --  206.40* 217.99* 222.72* 213.18*   < > 309.97*    < > = values in this interval not displayed.       Metabolic Studies    Recent Labs   Lab Test 10/28/22  1340 10/24/22  1637 10/19/22  1515 10/14/22  1137 10/05/22  0508 10/04/22  0541 10/01/22  1522    136 137 137   < > 136  --    POTASSIUM 3.8 4.1 4.2 3.9   < > 4.3  --    CHLORIDE 97* 98 100 98   < > 98  --    CO2 28 26 25 26   < > 25  --    ANIONGAP 11 12 12 13   < > 13  --    BUN 9.4 10.6 10.9 11.6   < > 11.6  --    CR 0.90 0.86 0.80 0.82   < > 0.72  --    GFRESTIMATED >90 >90 >90 >90   <  > >90  --    * 94 122* 99   < > 137*  --    JOCELYNE 9.7 9.8 9.3 9.9   < > 9.1  --    URIC  --   --   --  4.3  --   --   --    LACT  --   --   --   --   --   --  1.2   CKT  --   --   --   --   --  50  --     < > = values in this interval not displayed.       Hepatic Studies    Recent Labs   Lab Test 10/28/22  1340 10/24/22  1637 10/19/22  1515 10/14/22  1137 10/05/22  0508 10/04/22  1730   BILITOTAL 0.3 0.3 0.2 0.4   < > 0.4   DBIL  --   --   --   --   --  <0.20   ALKPHOS 64 73 80 120   < > 263*   PROTTOTAL 9.3* 9.4* 8.5* 9.4*   < > 7.9   ALBUMIN 3.9 4.0 3.5 3.9   < > 3.1*   AST 29 37 72* 71*   < > 146*   ALT 46 64* 102* 119*   < > 284*   LDH  --   --   --  297*  --   --     < > = values in this interval not displayed.       Gout Labs      Recent Labs   Lab Test 10/14/22  1137 09/25/22  1854   URIC 4.3 3.9       Hematology Studies   Recent Labs   Lab Test 10/28/22  1340 10/24/22  1637 10/19/22  1515 10/14/22  1127   WBC 5.7 3.7* 2.7* 3.0*   ANEU  --   --  0.6*  --    ANEUTAUTO 2.8 0.4*  --  1.1*   ALYM  --   --  1.3  --    ALYMPAUTO 1.9 2.1  --  1.4   BLAIR  --   --  0.6  --    AMONOAUTO 0.8 1.0  --  0.5   AEOS  --   --  0.2  --    AEOSAUTO 0.2 0.1  --  0.0   ABSBASO 0.0 0.1  --  0.0   HGB 11.0* 10.8* 9.1* 10.4*   HCT 35.5* 34.8* 28.8* 32.4*    445 432 339       Clotting Studies    Recent Labs   Lab Test 10/04/22  1730   INR 1.14       Iron Testing    Recent Labs   Lab Test 10/28/22  1340 10/24/22  1637 10/19/22  1515 10/14/22  1137 10/05/22  0508 10/04/22  1730   IRON  --   --   --   --   --  12*   FEB  --   --   --   --   --  184*   IRONSAT  --   --   --   --   --  7*   RUBY 599* 503*  --  1,838*  --  1,328*   MCV 86 84   < >  --    < >  --     < > = values in this interval not displayed.         Urine Studies     Recent Labs   Lab Test 10/04/22  1955   URINEPH 7.0   NITRITE Negative   LEUKEST Negative       Medication levels    Recent Labs   Lab Test 09/28/22  2141   VANCOMYCIN <4.0            Microbiology:     Last Culture results   Culture   Date Value Ref Range Status   10/28/2022 No Growth  Final   10/28/2022 No anaerobic organisms isolated after 5 days  Preliminary   10/28/2022 No Growth  Final   10/28/2022 No Growth  Final   10/14/2022 No Growth  Final   10/14/2022 No Growth  Final   10/02/2022 No anaerobic organisms isolated  Final   10/02/2022 No Growth  Final   10/01/2022 No Growth  Final   09/30/2022 No Growth  Final   09/30/2022 No Growth  Final   09/29/2022 No Growth  Final   09/28/2022 No Growth  Final   09/27/2022 No Growth  Final   09/26/2022 No anaerobic organisms isolated  Final   09/26/2022 No Growth  Final   09/26/2022 No anaerobic organisms isolated  Final   09/26/2022 (A)  Final    1+ Streptococcus agalactiae (Group B Streptococcus)     Comment:     Susceptibilities done on previous cultures   09/26/2022 No Growth  Final   09/25/2022 (A)  Final    1+ Streptococcus agalactiae (Group B Streptococcus)           Syphilis Testing  Invalid input(s): [id number]      Virology:  Coronavirus-19 testing    Recent Labs   Lab Test 09/25/22  1543   [id number] Negative           Hepatitis B Testing     Recent Labs   Lab Test 10/14/22  1137 10/04/22  1730   AUSAB  --  >1,000.00   HBCAB Nonreactive  --    HEPBANG  --  Nonreactive     Was the last Hepatitis B E antigen positive?   No results found for: HBEAGN     Hepatitis C Antibody   Date Value Ref Range Status   10/04/2022 Nonreactive Nonreactive Final           Imaging:  Results for orders placed or performed during the hospital encounter of 09/25/22   XR Chest 2 Views    Narrative    EXAM: XR CHEST 2 VIEWS  LOCATION: [facility]  DATE/TIME: 9/25/2022 4:18 PM    INDICATION: Left chest and shoulder pain  COMPARISON: None.      Impression    IMPRESSION: Cardiomediastinal silhouette is normal. Normal vasculature. Lungs and pleural spaces are clear. No fracture evident.   XR Knee Right 3 Views    Narrative    EXAM: XR  KNEE RIGHT 3 VIEWS  LOCATION: [facility]  DATE/TIME: 9/25/2022 4:16 PM    INDICATION: pain  COMPARISON: None.      Impression    IMPRESSION: Normal joint spaces and alignment. No fracture. Moderate joint effusion.   CT Knee Right w Contrast    Narrative    EXAM: CT KNEE RIGHT W CONTRAST  LOCATION: [facility]  DATE/TIME: 9/25/2022 8:46 PM    INDICATION: Right distal femur anterior swelling and knee swelling. Rule out abscess or other abnormality.  COMPARISON: Radiographs from 9/25/2022.  TECHNIQUE: IV contrast. Axial, sagittal and coronal thin-section reconstruction. Dose reduction techniques were used.   CONTRAST: 100 mL Isovue 370.    FINDINGS:     BONES AND JOINTS:  -There is a massive knee joint effusion. No calcified intra-articular body. No fracture. Mild osteoarthrosis of the patellofemoral joint.    SOFT TISSUES:  -No hematoma, cyst or mass. No gross muscle or tendon pathology. No evidence of abscess.      Impression    IMPRESSION:  1.  Massive knee joint effusion.  2.  Mild osteoarthrosis of the patellofemoral joint.  3.  Otherwise negative.     CT Sternum SC Joints without Contrast    Narrative    EXAM: CT STERNUM SC JOINTS WITHOUT CONTRAST  LOCATION: [facility]  DATE/TIME: 9/27/2022 6:28 PM    INDICATION: Chest pain. Left sternoclavicular joint effusion tapped and positive for Group B Strep, evaluate for extent of septic effusion inflammation.  COMPARISON: None.  TECHNIQUE: With IV contrast. Axial, sagittal and coronal thin-section reconstruction. Dose reduction techniques were used.   CONTRAST: 85 mL Isovue-300 were administered.    FINDINGS:     BONES:  -No fracture. No definite evidence of osteomyelitis. Small accessory ossicle at the cephalad margin of the manubrium. Normal alignment.     SOFT TISSUES:  -Moderate-sized effusion within the left sternoclavicular joint with mild overlying soft tissue swelling and localized  soft tissue stranding consistent with the patient's history of septic arthritis. No adenopathy. No discrete fluid collection to suggest   an abscess. Visualized portions of both upper lungs normal. Mediastinum unremarkable. Normal appearing right sternoclavicular joint.      Impression    IMPRESSION:  1.  No evidence of an abscess or osteomyelitis.    2.  Moderate-sized effusion within the left sternoclavicular joint with overlying soft tissue stranding compatible with the history of septic arthritis.     CT Soft tissue neck w contrast*    Narrative    EXAM: CT SOFT TISSUE NECK W CONTRAST  LOCATION: [facility]  DATE/TIME: 10/1/2022 4:25 PM    INDICATION: Recurrent fevers. Polyarticular infectious/inflammatory process, eval for additional abscess or infection  COMPARISON: None.  CONTRAST: 50 mL Isovue 370  TECHNIQUE: Routine CT Soft Tissue Neck with IV contrast. Multiplanar reformats. Dose reduction techniques were used.    FINDINGS:     MUCOSAL SPACES/SOFT TISSUES: Normal mucosal spaces of the upper aerodigestive tract. No mucosal mass or inflammation identified. Normal vocal cords and infraglottic trachea. Normal parapharyngeal space and  spaces. Normal oral cavity,    spaces, and floor of mouth structures.    LYMPH NODES: Shotty bilateral cervical lymph nodes, none frankly pathologic by size or morphology criteria.     SALIVARY GLANDS: Normal parotid and submandibular glands.    THYROID: Normal.     VESSELS: Vascular structures of the neck are patent.    VISUALIZED INTRACRANIAL/ORBITS/SINUSES: No abnormality of the visualized intracranial compartment or orbits. Visualized paranasal sinuses and mastoid air cells are clear.    OTHER: Left sternoclavicular joint effusion without aggressive/erosive osseous changes. Overlying cutaneous fat stranding and small locules of subcutaneous gas consistent with surgical drainage. Otherwise unremarkable visualized osseous  structures. The   included lung apices are clear.      Impression    IMPRESSION:   1.  Persistent left sternoclavicular joint effusion as seen on dedicated CT from 09/27/2022. Changes of interval surgical drainage without evidence for aggressive osseous erosion/destruction.  2.  No additional evidence for an infectious/inflammatory process in the neck.  3.  Nonspecific shotty cervical lymph nodes are presumably reactive in nature.   CT Chest/Abdomen/Pelvis w Contrast    Narrative    EXAM: CT CHEST/ABDOMEN/PELVIS W CONTRAST  LOCATION: [facility]  DATE/TIME: 10/1/2022 4:35 PM    INDICATION: Fever, eval for abscess or infection.  COMPARISON: None.  TECHNIQUE: CT scan of the chest, abdomen, and pelvis was performed following injection of IV contrast. Multiplanar reformats were obtained. Dose reduction techniques were used.   CONTRAST: 50 mL Isovue 370.    FINDINGS:   LUNGS AND PLEURA: Normal.    MEDIASTINUM/AXILLAE: Normal.    CORONARY ARTERY CALCIFICATION: None.    HEPATOBILIARY: Normal.    PANCREAS: Normal.    SPLEEN: Normal.    ADRENAL GLANDS: Normal.    KIDNEYS/BLADDER: Normal.    BOWEL: Normal.    LYMPH NODES: Normal.    VASCULATURE: Unremarkable.    PELVIC ORGANS: Normal.    MUSCULOSKELETAL: Soft tissue swelling overlying the left sternoclavicular joint with a bubble of air present superficially. This could be due to infection. There is no evidence for osteomyelitis.      Impression    IMPRESSION:  1.  Soft tissue swelling left sternoclavicular joint with some soft tissue air present suspicious for infection. No CT evidence for osteomyelitis.   CT Head w contrast    Narrative    EXAM: CT HEAD W CONTRAST  LOCATION: [facility]  DATE/TIME: 10/1/2022 4:44 PM    INDICATION: Recurrent fevers. Concern for polyarticular septic arthritis. Eval for abscess or infection  COMPARISON: None.  CONTRAST: 25 mL Isovue 370  TECHNIQUE: Routine CT Head with IV contrast.  Dose  reduction techniques were used.    FINDINGS:  INTRACRANIAL CONTENTS: No intracranial hemorrhage, extraaxial collection, or mass effect.  No CT evidence of acute infarct. Normal parenchymal attenuation. Normal ventricles and sulci. No pathologic enhancement identified.    VISUALIZED ORBITS/SINUSES/MASTOIDS: No intraorbital abnormality. No paranasal sinus mucosal disease. No middle ear or mastoid effusion.    BONES/SOFT TISSUES: No acute abnormality.      Impression    IMPRESSION:  1.  No evidence for acute intracranial process.   US Lower Extremity Venous Duplex Bilateral    Narrative    EXAM: US LOWER EXTREMITY VENOUS DUPLEX BILATERAL  LOCATION: [facility]  DATE/TIME: 10/1/2022 5:19 PM    INDICATION: Pain and swelling.  COMPARISON: None.  TECHNIQUE: Venous Duplex ultrasound of bilateral lower extremities with and without compression, augmentation and duplex. Color flow and spectral Doppler with waveform analysis performed.    FINDINGS: Exam includes the common femoral, femoral, popliteal veins as well as segmentally visualized deep calf veins and greater saphenous vein.     RIGHT: No deep vein thrombosis. No superficial thrombophlebitis. No popliteal cyst.    LEFT: No deep vein thrombosis. No superficial thrombophlebitis. No popliteal cyst.      Impression    IMPRESSION:  1.  No deep venous thrombosis in the bilateral lower extremities.   XR Chest Port 1 View    Narrative    XR CHEST PORT 1 VIEW   10/4/2022 1:59 PM     HISTORY: RN placed PICC - verify tip placement    COMPARISON: CT 10/1/2022.      Impression    IMPRESSION: Placement of right upper extremity PICC, tip overlying the  superior vena cava. Normal cardiomediastinal silhouette. Lungs are  clear. No acute bony abnormality.    HELIO ACOSTA MD         SYSTEM ID:  [id number]   US Abdomen Limited    Narrative    EXAM: US ABDOMEN LIMITED  LOCATION: [facility]  DATE/TIME: 10/4/2022 7:23 PM    INDICATION:  Increasing liver enzymes. Mild right upper quadrant tenderness.  COMPARISON: CT abdomen pelvis 10/01/2022.  TECHNIQUE: Limited abdominal ultrasound.    FINDINGS:    GALLBLADDER: Normal. No gallstones, wall thickening, or pericholecystic fluid. Negative sonographic Ramos's sign.    BILE DUCTS: No biliary dilatation. The common duct measures 4 mm.    LIVER: Normal parenchyma with smooth contour. No focal mass.    RIGHT KIDNEY: No hydronephrosis.    PANCREAS: The visualized portions are normal.    No ascites.      Impression    IMPRESSION:    1.  Normal limited abdominal ultrasound.   Echocardiogram Complete     Value    LVEF  60-65%    Narrative    [id number]  [id number]  [id number]  [id number]^JOEL^MELANY^MADHU     [facility]  Echocardiography Laboratory  [address] [place] [address]     Name: SEDRICK TONY  MRN: [id number]  : 1990  Study Date: 2022 10:59 AM  Age: 32 yrs  Gender: Male  Patient Location: [facility]  Reason For Study: Endocarditis  Ordering Physician: MELANY MALDONADO  Performed By: Mary Russell RDCS     BSA: 2.1 m2  Height: 72 in  Weight: 191 lb  HR: 99  BP: 119/64 mmHg  ______________________________________________________________________________  Procedure  Complete Portable Echo Adult.  ______________________________________________________________________________  Interpretation Summary     Left ventricular systolic function is normal.  The visual ejection fraction is 60-65%.  The right ventricle is normal in structure, function and size.  Valve structures without significant dysfunction. No apparent valvular  vegetations.  The inferior vena cava was normal in size with preserved respiratory  variability.  There is no pericardial effusion.     No prior study for comparison. Consider LINDA if clnically indicated to assess  for endocarditis.  ______________________________________________________________________________  Left Ventricle  The left ventricle is normal in  structure, function and size. Left ventricular  systolic function is normal. The visual ejection fraction is 60-65%. Normal  left ventricular wall motion.     Right Ventricle  The right ventricle is normal in structure, function and size.     Atria  Normal left atrial size. Right atrial size is normal.     Mitral Valve  The mitral valve is normal in structure and function.     Tricuspid Valve  The tricuspid valve is normal in structure and function. Right ventricular  systolic pressure could not be approximated due to inadequate tricuspid  regurgitation.     Aortic Valve  The aortic valve is normal in structure and function.     Pulmonic Valve  The pulmonic valve is normal in structure and function.     Vessels  The aortic root is normal size. Normal size ascending aorta. The inferior vena  cava was normal in size with preserved respiratory variability.     Pericardium  There is no pericardial effusion.     ______________________________________________________________________________  MMode/2D Measurements & Calculations  IVSd: 1.1 cm  LVIDd: 4.7 cm  LVIDs: 2.8 cm  LVPWd: 0.97 cm  FS: 39.5 %     LV mass(C)d: 169.1 grams  LV mass(C)dI: 80.9 grams/m2  Ao root diam: 3.4 cm  LA dimension: 2.9 cm  asc Aorta Diam: 2.9 cm  LA/Ao: 0.84  RWT: 0.41     Doppler Measurements & Calculations  MV E max jerri: 84.9 cm/sec  MV A max jerri: 96.1 cm/sec  MV E/A: 0.88  MV dec time: 0.13 sec  PA acc time: 0.12 sec  E/E' av.5  Lateral E/e': 8.5  Medial E/e': 8.6     ______________________________________________________________________________  Report approved by: Kwaku Mota 2022 02:08 PM               Duplex Ultrasound Right Lower Extremity 10/14/2022    Impression:  1. Acute occlusive DVT of one of the paired posterior tibial veins  from the proximal calf to ankle.     2. The other posterior tibial vein with acute DVT in the mid calf.      TTE 22  Interpretation Summary     Left ventricular systolic function is  normal.  The visual ejection fraction is 60-65%.  The right ventricle is normal in structure, function and size.  Valve structures without significant dysfunction. No apparent valvular  vegetations.  The inferior vena cava was normal in size with preserved respiratory  variability.  There is no pericardial effusion.     No prior study for comparison. Consider LINDA if clnically indicated to assess  for endocarditis.  ______________________________________________________________________________  Left Ventricle  The left ventricle is normal in structure, function and size. Left ventricular  systolic function is normal. The visual ejection fraction is 60-65%. Normal  left ventricular wall motion.     Right Ventricle  The right ventricle is normal in structure, function and size.     Atria  Normal left atrial size. Right atrial size is normal.     Mitral Valve  The mitral valve is normal in structure and function.     Tricuspid Valve  The tricuspid valve is normal in structure and function. Right ventricular  systolic pressure could not be approximated due to inadequate tricuspid  regurgitation.     Aortic Valve  The aortic valve is normal in structure and function.     Pulmonic Valve  The pulmonic valve is normal in structure and function.     Vessels  The aortic root is normal size. Normal size ascending aorta. The inferior vena  cava was normal in size with preserved respiratory variability.     Pericardium  There is no pericardial effusion.    Sanket is a 32 year old who is being evaluated via a billable video visit.      How would you like to obtain your AVS? MyChart  If the video visit is dropped, the invitation should be resent by: Text to cell phone: [phone]  Will anyone else be joining your video visit? No        Video-Visit Details    Video Start Time: 9:54    Type of service:  Video Visit    Video End Time:10:16    Originating Location (pt. Location): Home        Distant Location (provider location):   On-site    Platform used for Video Visit: Sam      Again, thank you for allowing me to participate in the care of your patient.      Sincerely,    RESHMA Freitas CNP

## 2022-11-03 NOTE — PHARMACY-ADMISSION MEDICATION HISTORY
Pharmacy Note - Admission Medication History    Pertinent Provider Information:    ______________________________________________________________________    Prior To Admission (PTA) med list completed and updated in EMR.       PTA Med List   Medication Sig Note Last Dose     acetaminophen (TYLENOL) 325 MG tablet Take 2 tablets (650 mg) by mouth every 6 hours as needed for mild pain, other or fever (and adjunct with moderate or severe pain or per patient request)  Past Week     Apixaban Starter Pack (ELIQUIS DVT/PE STARTER PACK) 5 MG TBPK Take 10 mg by mouth 2 times daily for 7 days, THEN 5 mg 2 times daily for 90 days. 11/3/2022: 11/3 -Currently on 5mg bid 10/31/2022     cetirizine (ZYRTEC) 10 MG tablet Take 10 mg by mouth daily as needed for allergies  More than a month     loratadine (CLARITIN) 10 MG tablet Take 10 mg by mouth daily as needed for allergies  More than a month     oxyCODONE (ROXICODONE) 5 MG tablet Take 1 tablet (5 mg) by mouth every 4 hours as needed for moderate to severe pain  Past Week     traMADol (ULTRAM) 50 MG tablet Take 50 mg by mouth every 6 hours as needed for severe pain  Past Week       Information source(s): Patient and CareEverywhere/[facility]    Method of interview communication: in-person    Patient was asked about OTC/herbal products specifically.  PTA med list reflects this.    Based on the pharmacist's assessment, the PTA med list information appears reliable    Allergies were reviewed, assessed, and updated with the patient.      Patient did not bring any medications to the hospital and can't retrieve from home. No multi-dose medications are available for use during hospital stay.      Thank you for the opportunity to participate in the care of this patient.      Manuel Heaton RPH     11/3/2022     1:47 PM

## 2022-11-03 NOTE — H&P (VIEW-ONLY)
[facility]  Transplant Infectious Disease Clinic Note:  Follow up video visit     Patient:  Sanket Guerrero, Date of birth 1990, Medical record number [id number]  Date of Visit:  11/03/2022  Consult requested by Dr. Braxton for evaluation of spontaneous septic arthritis of native right knee and left sternoclavicular joint.    Video start: 9:54 AM  Video end: 10:16 AM  [employer]ia [employer]         Assessment and Recommendations:   Recommendations:  1. Follow up with Rheumatology appointment with Brittney Barrett PA-C on 11/22/22  2. Follow up with Dr. Hahn and your primary care provider on when to restart Eliquis for DVT  3. TTE is ordered in the chart and will be available when you check in for surgery today  4. Follow up as needed; please contact our clinic to schedule an appointment  5. Have a low threshold for heading to the Emergency Department if you are feeling worse in any way  6. Watch for signs of post-surgical infection including: redness around surgical incision, the incision opening up and or draining fluid that is foul-smell or purulent      Assessment:    Sanket Guerrero is a 32 year old male with a PMH significant for gout who presents today following a recent hospitalization (9/25/22-10/5/22) for septic arthritis (Strep agalactiae) of his native right knee and left sternoclavicular joint septic arthritis s/p multiple washouts and c/b transaminitis. Concern for underlying rheumatologic condition that could increase the patient's risk for spontaneous septic arthritis was considered by Dr. Hahn and ourselves.     Continues to have intermittent low-grade temperatures of 99.3 with nightly sweats. Higher fevers and nightsweats. Most recent labs show CRP trending down, an ferritin that continues to be elevated; potentially related to right knee hematoma vs underlying autoimmune inflammatory process. Normal white blood cell count; neutropenia potentially related to IV ceftriaxone now  resolved. Anemia also resolving and perhaps related to ceftriaxone. Adding ceftriaxone as a possible cause of cytopenia to Sanket's allergy list. Blood cultures and synovial fluid aspirate cultures (noted by surgeon to be a small sample size d/t coagulated blood) from 10/28 with no growth to date. Other rheumatologic and infectious work up negative. Considered that his initial daily fevers and drenching nightsweats (throughout hospitalization and outpatient) despite appropriate targeted therapy for his septic arthritis might be related to a drug-fever in the setting of IV ceftriaxone. Patient will follow up with Rheumatology in the near future to undergo a thorough workup for underlying autoimmune condition that may have predisposed him to being at higher risk for spontaneous septic arthritis of two non contiguous joints. Will repeat TTE to ensure no endocarditis under the suspicion that the patient was likely bacteremic prior to hospitalization. Order is placed and he may have this performed while he is hospitalized overnight for his right knee surgery today.      Ultrasound right lower extremity 10/14/2022 positive for 2 acute occlusive clots present in the paired posterior tibial veins proximal calf to ankle and then in the other posterior tibial vein in the mid calf. Continuing with PCP for management of DVT with Eliquis. Continue to use ibuprofen or Celebrex sparingly while on Eliquis. Concern for increased risk of bleeding with Eliquis and Celebrex or ibuprofen.       Recommend to patient and fiance that they have a low threshold for returning to the ED if any symptoms worsen. I am concerned with the continue systemic symptoms there is a source of infection we have yet to identify versus a rheumatologic process.     Sanket is set to have right open-knee surgery with Dr. Hahn today at 2pm to remove the coagulated blood. Left a voicemail with Dr. Hahn's nurse that if there is any synovial fluid to sample,  we would like aerobic and anaerobic cultures along with a cell count and analysis.    Sanket is feeling much improved, with labs returning to normal, and repeat cultures negative. He has been antibiotic free for over a week without worsening of his symptoms. His infection is clear from our standpoint and does not need regular follow up. He may schedule to follow up with us at any point if he has future concerns. We will contact him if anything abnormal returns with the repeat TTE.     I spent 64 minutes as part of a video visit on the date of the encounter doing chart review, history and exam, documentation.        RESHMA Freitas, CNP  Infectious Diseases  Pager# [phone]           Interval History:   Sanket Guerrero is a 32 year old male with a PMH significant for gout who presents today following a recent hospitalization (9/25/22-10/5/22) for septic arthritis (Strep agalactiae) of his native right knee and left sternoclavicular joint septic arthritis s/p multiple washouts and c/b transaminitis.     11/3/22: Very infrequent fevers (99.3F max), daily nightsweats but not drenching, no chills. Still having pruritus with both arms, right knee with small red bumps. Hasn't improved since stopping the Amoxicillin. Rash/hives improve with a damp cloth. Put Elliquis on hold two days ago.      10/24/2022: fevers have resolved, over the past 5 days. Nightsweats have improved, less frequent but still daily, last was this early morning. Left leg cramps improved with walking, worse first thing in the AM (on day 10-11 of Eliquis). Right knee, unable to bend the knee fully due to swelling. Pain has improved since starting antibiotics. Occasional intermittent sharp pain in right hip, but goes away by itself, no swelling.    Rash began Thursday on right arm where PICC line sleeve was. Started the Amoxicillin Friday and itching and rash spread across his body, then PICC removed Saturday. Denies any pain or issues with the PICC line.  "Notes some difficulty swallowing though transient since starting amoxicillin. Tried benadryl two nights ago, which helped with the itching but the hives were persistent. Tried hydrocortisone cream and no relief.     States he was let go from his job related to being out for medical reasons.    Denies headache, cough, sore, throat, difficulty breathing, nausea, abdominal pain, diarrhea, dysuria.     10/14/2022, still having nightsweats and fevers (101 at max) at night. Ok all day but the nighttime is the worst. And shaking chills for the last two days. Feels better during the day when taking ceftriaxone. Intermittent right calf soreness \"leg cramp\", that goes away on its own but returns. Good appetite, but has lost about 15lbs since hospitalization. Denies nausea, vomiting, abdominal pain, diarrhea, dysuria, acute rashes, arthralgia, myalgias.    Seen by Dr. Hahn (10/13) who removed the stitches, performed another aspiration of the right knee. Removed 80ccs of fluid and blood. Spoke with Dr. Hahn today (10/14), who noted that initial right knee aspirate (did not look purulent, but more inflammatory (9/25). He also is concerned about the possibility of a rheumatological etiology for this patient's long history of monoarticular joint effusions acting as a potential risk factor for his recent episode of septic arthritis and ongoing fevers and nightsweats, significantly elevated acute phase reactants, despite appropriate antibiotic therapy and source control.        History of the Infectious Disease lllness:     Thought initially he had food poisoning, ate some leftover food that he kept in his car on a hot day, began to develop symptoms rapidly throughout that day (9/21). That evening developed rigors, chills, fever, nightsweats, vomiting (9/21), then developed pain initially in his left shoulder near the left collar bone (9/22), right knee pain developed (9/23).     Presented to [facility] [place] with several days of right knee and left-sided chest wall pain and swelling with associated fever (101.3), and recent nausea and vomiting. Blood cultures (9/25), joint aspirate cell count (>300,000 cells), crystal analysis (negative), and cultures were drawn. Started on IV vancomycin and cefepime (9/26). Right knee synovial aspirate was positive with 1+ Strep agalactiae (pan-sensitive) on Day 2 of incubation. Repeat synovial fluid culture (9/26) finalized with no growth to date. Curbside consult (9/27) with Dr. Guthrie, who provided further instructions on infectious workup and antibiotic narrowing. Repeat right knee aspirate (10/2) sent for gram stain, aerobic, and anaerobic culture, with Gram stain negative and repeat cultures finalized with no growth to date after 11 days. Left sternoclavicular aspirate culture grew 1+ Strep agalactiae collected (9/26) and positive on Day 1 of incubation. Daily blood cultures (9/25 - 10/1) finalized with no growth to date. Other negative infectious workup includes: urinalysis, Chlamydia trachomatis/Neisseria gonorrheae PCR, SARS-CoV2 PCR, Influenza A and B PCR, MRSA nares, lyme DNA PCR, Hep B surface Agn, Hep A IgM antibody, Hep C antibody. Hep B surface antibody reactive with >1,000.00.    Right knee s/p I&D (9/26, 9/28, 10/2)  Left Sternoclavicular joint I&D (9/28, 10/2)    Throughout the patient's hospitalization he continued to have daily fevers with a Tmax of 102.9 on 10/1. CRP significantly elevated (309.97) on admission and trended down to 206.4 on discharge (10/5); leukocytosis of 12.5 on admission but resolving (10/4); ESR elevated on admissoion. Developed transaminitis with elevated alk phos and LFTs (9/27), which continued to rise and began trending down after 10/4.      CT  right knee with contrast on 9/25/22 showed Massive knee joint effusion. Mild osteoarthrosis of the patellofemoral joint.  CT sternum SC joints (9/27/22) showed moderate effusion  "left sternoclavicular joint w/overlying soft tissue stranding. No abscess or osteomyelitis  Remaining scans are negative   TTE: negative signs of endocarditis, no valvular vegetations, pericardial effusion. Normal EF      History of monoarticular \"flare ups\" with swelling and pain that will last several days, then resolve on their own. Has had this for about 12 years. Denies any heat to the joint or erythema during these flare ups.      Childhood healthy, played lots of contact sports but denies any major injuries requiring surgery or joint injections.    Grandfather history of gout, DMII. Father hypercholesterolemia. Mom otherwise healthy.    HOME/ENVIRONMENT: house, no concerns for pests or mold    OCCUPATION: Information technology    TRAVEL: born in [place], traveled to [place] in august. No other recent travel outside [place]    OUTDOOR ACTIVITIES:   - Camping: no, but enjoys fishing (June)  - Cave exploring: no   - Walking barefoot on soil or grass: no  - Swimming or wading in rivers, lakes or streams: no this year  - Hot tubs or Jacuzzis: no    ANIMALS: one dog, no farm animal exposure    FOOD/WATER:   - Raw undercooked meat or sushi: eats sushi once a month  - Unwashed fruits or vegetables: yes  - Unpasteurized milk: no  - Well water: no    DENTAL WORK: no    TUBERCULOSIS:   - Known TB exposures: no  - Prior TB testing results: no  - Healthcare work: no : no  - Homelessness: no  - FCI: no    TOBACCO/ALCOHOL/RECREATIONAL DRUGS: occasional alcoholic drink    SEXUAL ACTIVITY: yes, one-partner    Review of systems is otherwise negative. Pertinent positives and negatives listed above.      Past Medical History:   Diagnosis Date     Chronic infection        Past Surgical History:   Procedure Laterality Date     ARTHROSCOPY KNEE INCISION AND DRAINAGE Bilateral 9/26/2022    Procedure: Right knee arthroscopic irrigation and debridement, partial synovectomy and left sternoclavicular joint needle aspiration;  " Surgeon: Jovani Hahn MD;  Location: [facility] OR     ARTHROSCOPY KNEE IRRIGATION AND DEBRIDEMENT Right 9/28/2022    Procedure: Right IRRIGATION AND DEBRIDEMENT, KNEE, ARTHROSCOPIC;  Surgeon: Jovani Hahn MD;  Location: [facility] OR     ARTHROSCOPY KNEE IRRIGATION AND DEBRIDEMENT Right 10/2/2022    Procedure: IRRIGATION AND DEBRIDEMENT, KNEE, ARTHROSCOPIC RIGHT;  Surgeon: Zeke Vazquez MD;  Location: [facility] OR     IRRIGATION AND DEBRIDEMENT NECK, COMBINED Left 9/28/2022    Procedure: Open IRRIGATION AND DEBRIDEMENT of Sternal Clavicular Joint Left;  Surgeon: Jovani Hahn MD;  Location: [facility] OR     IRRIGATION AND DEBRIDEMENT SHOULDER, COMBINED Left 10/2/2022    Procedure: Left Sternoclavicular Joint Irrigation And Debridement;  Surgeon: Zeke Vazquez MD;  Location: [facility] OR       No family history on file.    Social History     Social History Narrative     Not on file     Social History     Tobacco Use     Smoking status: Former     Types: Cigarettes     Smokeless tobacco: Never     Tobacco comments:     Minimal smoking   Substance Use Topics     Alcohol use: Not Currently     Drug use: Never       Immunization History   Administered Date(s) Administered     COVID-19,PF,Moderna 04/14/2021, 05/12/2021       Patient Active Problem List   Diagnosis     Left-sided chest wall pain     Effusion of right knee joint     Febrile illness     Septic arthritis of knee, right (H)       No outpatient medications have been marked as taking for the 11/3/22 encounter (Appointment) with Lily Azevedo APRN CNP.       Allergies   Allergen Reactions     Amoxicillin Rash     Unsure if amoxicillin was the cause, started multiple other medications around the day he started this medication. Also endorsed some intermittent throat tightening and difficulty swallowing.               Physical Exam:   Vitals were reviewed.  All vitals stable  There were no vitals taken for this visit.  Wt Readings from Last 4 Encounters:    10/26/22 78 kg (172 lb)   10/24/22 78.5 kg (173 lb)   10/14/22 79.6 kg (175 lb 8 oz)   09/26/22 86.9 kg (191 lb 9.3 oz)     No vitals taken; this was a video visit  Exam:   GENERAL: Healthy, alert and no distress  EYES: Eyes grossly normal to inspection.  No discharge or erythema, or obvious scleral/conjunctival abnormalities.  RESP: No audible wheeze, cough, or visible cyanosis.  No visible retractions or increased work of breathing.    SKIN: Visible skin clear. No significant rash, abnormal pigmentation or lesions.  NEURO: Cranial nerves grossly intact.  Mentation and speech appropriate for age.  PSYCH: Mentation appears normal, affect normal/bright, judgement and insight intact, normal speech and appearance well-groomed.               Laboratory Data:     Inflammatory Markers    Recent Labs   Lab Test 10/28/22  1340 10/24/22  1637 10/19/22  1515 10/14/22  1137 10/05/22  0508 10/04/22  0541 10/02/22  0516 10/01/22  0614 09/30/22  0601 09/27/22  0454 09/25/22  1854   SED  --   --   --   --  124*  --   --   --   --   --  73*   CRP 17.09* 35.20* 28.71* 51.40*  --  206.40* 217.99* 222.72* 213.18*   < > 309.97*    < > = values in this interval not displayed.       Metabolic Studies    Recent Labs   Lab Test 10/28/22  1340 10/24/22  1637 10/19/22  1515 10/14/22  1137 10/05/22  0508 10/04/22  0541 10/01/22  1522    136 137 137   < > 136  --    POTASSIUM 3.8 4.1 4.2 3.9   < > 4.3  --    CHLORIDE 97* 98 100 98   < > 98  --    CO2 28 26 25 26   < > 25  --    ANIONGAP 11 12 12 13   < > 13  --    BUN 9.4 10.6 10.9 11.6   < > 11.6  --    CR 0.90 0.86 0.80 0.82   < > 0.72  --    GFRESTIMATED >90 >90 >90 >90   < > >90  --    * 94 122* 99   < > 137*  --    JOCELYNE 9.7 9.8 9.3 9.9   < > 9.1  --    URIC  --   --   --  4.3  --   --   --    LACT  --   --   --   --   --   --  1.2   CKT  --   --   --   --   --  50  --     < > = values in this interval not displayed.       Hepatic Studies    Recent Labs   Lab Test  10/28/22  1340 10/24/22  1637 10/19/22  1515 10/14/22  1137 10/05/22  0508 10/04/22  1730   BILITOTAL 0.3 0.3 0.2 0.4   < > 0.4   DBIL  --   --   --   --   --  <0.20   ALKPHOS 64 73 80 120   < > 263*   PROTTOTAL 9.3* 9.4* 8.5* 9.4*   < > 7.9   ALBUMIN 3.9 4.0 3.5 3.9   < > 3.1*   AST 29 37 72* 71*   < > 146*   ALT 46 64* 102* 119*   < > 284*   LDH  --   --   --  297*  --   --     < > = values in this interval not displayed.       Gout Labs      Recent Labs   Lab Test 10/14/22  1137 09/25/22  1854   URIC 4.3 3.9       Hematology Studies   Recent Labs   Lab Test 10/28/22  1340 10/24/22  1637 10/19/22  1515 10/14/22  1127   WBC 5.7 3.7* 2.7* 3.0*   ANEU  --   --  0.6*  --    ANEUTAUTO 2.8 0.4*  --  1.1*   ALYM  --   --  1.3  --    ALYMPAUTO 1.9 2.1  --  1.4   BLAIR  --   --  0.6  --    AMONOAUTO 0.8 1.0  --  0.5   AEOS  --   --  0.2  --    AEOSAUTO 0.2 0.1  --  0.0   ABSBASO 0.0 0.1  --  0.0   HGB 11.0* 10.8* 9.1* 10.4*   HCT 35.5* 34.8* 28.8* 32.4*    445 432 339       Clotting Studies    Recent Labs   Lab Test 10/04/22  1730   INR 1.14       Iron Testing    Recent Labs   Lab Test 10/28/22  1340 10/24/22  1637 10/19/22  1515 10/14/22  1137 10/05/22  0508 10/04/22  1730   IRON  --   --   --   --   --  12*   FEB  --   --   --   --   --  184*   IRONSAT  --   --   --   --   --  7*   RUBY 599* 503*  --  1,838*  --  1,328*   MCV 86 84   < >  --    < >  --     < > = values in this interval not displayed.         Urine Studies     Recent Labs   Lab Test 10/04/22  1955   URINEPH 7.0   NITRITE Negative   LEUKEST Negative       Medication levels    Recent Labs   Lab Test 09/28/22  2141   VANCOMYCIN <4.0           Microbiology:     Last Culture results   Culture   Date Value Ref Range Status   10/28/2022 No Growth  Final   10/28/2022 No anaerobic organisms isolated after 5 days  Preliminary   10/28/2022 No Growth  Final   10/28/2022 No Growth  Final   10/14/2022 No Growth  Final   10/14/2022 No Growth  Final   10/02/2022 No  anaerobic organisms isolated  Final   10/02/2022 No Growth  Final   10/01/2022 No Growth  Final   09/30/2022 No Growth  Final   09/30/2022 No Growth  Final   09/29/2022 No Growth  Final   09/28/2022 No Growth  Final   09/27/2022 No Growth  Final   09/26/2022 No anaerobic organisms isolated  Final   09/26/2022 No Growth  Final   09/26/2022 No anaerobic organisms isolated  Final   09/26/2022 (A)  Final    1+ Streptococcus agalactiae (Group B Streptococcus)     Comment:     Susceptibilities done on previous cultures   09/26/2022 No Growth  Final   09/25/2022 (A)  Final    1+ Streptococcus agalactiae (Group B Streptococcus)           Syphilis Testing  Invalid input(s): [id number]      Virology:  Coronavirus-19 testing    Recent Labs   Lab Test 09/25/22  1543   [id number] Negative           Hepatitis B Testing     Recent Labs   Lab Test 10/14/22  1137 10/04/22  1730   AUSAB  --  >1,000.00   HBCAB Nonreactive  --    HEPBANG  --  Nonreactive     Was the last Hepatitis B E antigen positive?   No results found for: HBEAGN     Hepatitis C Antibody   Date Value Ref Range Status   10/04/2022 Nonreactive Nonreactive Final           Imaging:  Results for orders placed or performed during the hospital encounter of 09/25/22   XR Chest 2 Views    Narrative    EXAM: XR CHEST 2 VIEWS  LOCATION: [facility]  DATE/TIME: 9/25/2022 4:18 PM    INDICATION: Left chest and shoulder pain  COMPARISON: None.      Impression    IMPRESSION: Cardiomediastinal silhouette is normal. Normal vasculature. Lungs and pleural spaces are clear. No fracture evident.   XR Knee Right 3 Views    Narrative    EXAM: XR KNEE RIGHT 3 VIEWS  LOCATION: [facility]  DATE/TIME: 9/25/2022 4:16 PM    INDICATION: pain  COMPARISON: None.      Impression    IMPRESSION: Normal joint spaces and alignment. No fracture. Moderate joint effusion.   CT Knee Right w Contrast    Narrative    EXAM: CT KNEE RIGHT W CONTRAST  LOCATION:  [facility] [place]  DATE/TIME: 9/25/2022 8:46 PM    INDICATION: Right distal femur anterior swelling and knee swelling. Rule out abscess or other abnormality.  COMPARISON: Radiographs from 9/25/2022.  TECHNIQUE: IV contrast. Axial, sagittal and coronal thin-section reconstruction. Dose reduction techniques were used.   CONTRAST: 100 mL Isovue 370.    FINDINGS:     BONES AND JOINTS:  -There is a massive knee joint effusion. No calcified intra-articular body. No fracture. Mild osteoarthrosis of the patellofemoral joint.    SOFT TISSUES:  -No hematoma, cyst or mass. No gross muscle or tendon pathology. No evidence of abscess.      Impression    IMPRESSION:  1.  Massive knee joint effusion.  2.  Mild osteoarthrosis of the patellofemoral joint.  3.  Otherwise negative.     CT Sternum SC Joints without Contrast    Narrative    EXAM: CT STERNUM SC JOINTS WITHOUT CONTRAST  LOCATION: [facility]  DATE/TIME: 9/27/2022 6:28 PM    INDICATION: Chest pain. Left sternoclavicular joint effusion tapped and positive for Group B Strep, evaluate for extent of septic effusion inflammation.  COMPARISON: None.  TECHNIQUE: With IV contrast. Axial, sagittal and coronal thin-section reconstruction. Dose reduction techniques were used.   CONTRAST: 85 mL Isovue-300 were administered.    FINDINGS:     BONES:  -No fracture. No definite evidence of osteomyelitis. Small accessory ossicle at the cephalad margin of the manubrium. Normal alignment.     SOFT TISSUES:  -Moderate-sized effusion within the left sternoclavicular joint with mild overlying soft tissue swelling and localized soft tissue stranding consistent with the patient's history of septic arthritis. No adenopathy. No discrete fluid collection to suggest   an abscess. Visualized portions of both upper lungs normal. Mediastinum unremarkable. Normal appearing right sternoclavicular joint.      Impression    IMPRESSION:  1.  No evidence of an  abscess or osteomyelitis.    2.  Moderate-sized effusion within the left sternoclavicular joint with overlying soft tissue stranding compatible with the history of septic arthritis.     CT Soft tissue neck w contrast*    Narrative    EXAM: CT SOFT TISSUE NECK W CONTRAST  LOCATION: [facility]  DATE/TIME: 10/1/2022 4:25 PM    INDICATION: Recurrent fevers. Polyarticular infectious/inflammatory process, eval for additional abscess or infection  COMPARISON: None.  CONTRAST: 50 mL Isovue 370  TECHNIQUE: Routine CT Soft Tissue Neck with IV contrast. Multiplanar reformats. Dose reduction techniques were used.    FINDINGS:     MUCOSAL SPACES/SOFT TISSUES: Normal mucosal spaces of the upper aerodigestive tract. No mucosal mass or inflammation identified. Normal vocal cords and infraglottic trachea. Normal parapharyngeal space and  spaces. Normal oral cavity,    spaces, and floor of mouth structures.    LYMPH NODES: Shotty bilateral cervical lymph nodes, none frankly pathologic by size or morphology criteria.     SALIVARY GLANDS: Normal parotid and submandibular glands.    THYROID: Normal.     VESSELS: Vascular structures of the neck are patent.    VISUALIZED INTRACRANIAL/ORBITS/SINUSES: No abnormality of the visualized intracranial compartment or orbits. Visualized paranasal sinuses and mastoid air cells are clear.    OTHER: Left sternoclavicular joint effusion without aggressive/erosive osseous changes. Overlying cutaneous fat stranding and small locules of subcutaneous gas consistent with surgical drainage. Otherwise unremarkable visualized osseous structures. The   included lung apices are clear.      Impression    IMPRESSION:   1.  Persistent left sternoclavicular joint effusion as seen on dedicated CT from 09/27/2022. Changes of interval surgical drainage without evidence for aggressive osseous erosion/destruction.  2.  No additional evidence for an infectious/inflammatory  process in the neck.  3.  Nonspecific shotty cervical lymph nodes are presumably reactive in nature.   CT Chest/Abdomen/Pelvis w Contrast    Narrative    EXAM: CT CHEST/ABDOMEN/PELVIS W CONTRAST  LOCATION: [facility] [place]  DATE/TIME: 10/1/2022 4:35 PM    INDICATION: Fever, eval for abscess or infection.  COMPARISON: None.  TECHNIQUE: CT scan of the chest, abdomen, and pelvis was performed following injection of IV contrast. Multiplanar reformats were obtained. Dose reduction techniques were used.   CONTRAST: 50 mL Isovue 370.    FINDINGS:   LUNGS AND PLEURA: Normal.    MEDIASTINUM/AXILLAE: Normal.    CORONARY ARTERY CALCIFICATION: None.    HEPATOBILIARY: Normal.    PANCREAS: Normal.    SPLEEN: Normal.    ADRENAL GLANDS: Normal.    KIDNEYS/BLADDER: Normal.    BOWEL: Normal.    LYMPH NODES: Normal.    VASCULATURE: Unremarkable.    PELVIC ORGANS: Normal.    MUSCULOSKELETAL: Soft tissue swelling overlying the left sternoclavicular joint with a bubble of air present superficially. This could be due to infection. There is no evidence for osteomyelitis.      Impression    IMPRESSION:  1.  Soft tissue swelling left sternoclavicular joint with some soft tissue air present suspicious for infection. No CT evidence for osteomyelitis.   CT Head w contrast    Narrative    EXAM: CT HEAD W CONTRAST  LOCATION: [facility]  DATE/TIME: 10/1/2022 4:44 PM    INDICATION: Recurrent fevers. Concern for polyarticular septic arthritis. Eval for abscess or infection  COMPARISON: None.  CONTRAST: 25 mL Isovue 370  TECHNIQUE: Routine CT Head with IV contrast.  Dose reduction techniques were used.    FINDINGS:  INTRACRANIAL CONTENTS: No intracranial hemorrhage, extraaxial collection, or mass effect.  No CT evidence of acute infarct. Normal parenchymal attenuation. Normal ventricles and sulci. No pathologic enhancement identified.    VISUALIZED ORBITS/SINUSES/MASTOIDS: No intraorbital abnormality.  No paranasal sinus mucosal disease. No middle ear or mastoid effusion.    BONES/SOFT TISSUES: No acute abnormality.      Impression    IMPRESSION:  1.  No evidence for acute intracranial process.   US Lower Extremity Venous Duplex Bilateral    Narrative    EXAM: US LOWER EXTREMITY VENOUS DUPLEX BILATERAL  LOCATION: [facility]  DATE/TIME: 10/1/2022 5:19 PM    INDICATION: Pain and swelling.  COMPARISON: None.  TECHNIQUE: Venous Duplex ultrasound of bilateral lower extremities with and without compression, augmentation and duplex. Color flow and spectral Doppler with waveform analysis performed.    FINDINGS: Exam includes the common femoral, femoral, popliteal veins as well as segmentally visualized deep calf veins and greater saphenous vein.     RIGHT: No deep vein thrombosis. No superficial thrombophlebitis. No popliteal cyst.    LEFT: No deep vein thrombosis. No superficial thrombophlebitis. No popliteal cyst.      Impression    IMPRESSION:  1.  No deep venous thrombosis in the bilateral lower extremities.   XR Chest Port 1 View    Narrative    XR CHEST PORT 1 VIEW   10/4/2022 1:59 PM     HISTORY: RN placed PICC - verify tip placement    COMPARISON: CT 10/1/2022.      Impression    IMPRESSION: Placement of right upper extremity PICC, tip overlying the  superior vena cava. Normal cardiomediastinal silhouette. Lungs are  clear. No acute bony abnormality.    HELIO ACOSTA MD         SYSTEM ID:  [id number]   US Abdomen Limited    Narrative    EXAM: US ABDOMEN LIMITED  LOCATION: [facility]  DATE/TIME: 10/4/2022 7:23 PM    INDICATION: Increasing liver enzymes. Mild right upper quadrant tenderness.  COMPARISON: CT abdomen pelvis 10/01/2022.  TECHNIQUE: Limited abdominal ultrasound.    FINDINGS:    GALLBLADDER: Normal. No gallstones, wall thickening, or pericholecystic fluid. Negative sonographic Ramos's sign.    BILE DUCTS: No biliary dilatation. The common duct  measures 4 mm.    LIVER: Normal parenchyma with smooth contour. No focal mass.    RIGHT KIDNEY: No hydronephrosis.    PANCREAS: The visualized portions are normal.    No ascites.      Impression    IMPRESSION:    1.  Normal limited abdominal ultrasound.   Echocardiogram Complete     Value    LVEF  60-65%    Narrative    [id number]  [id number]  [id number]  [id number]^AGNESLA^MELANY^MADHU     [facility]  Echocardiography Laboratory  [address]  [place] [id number]     Name: SEDRICK TONY  MRN: [id number]  : 1990  Study Date: 2022 10:59 AM  Age: 32 yrs  Gender: Male  Patient Location: [facility]  Reason For Study: Endocarditis  Ordering Physician: MELANY MALDONADO  Performed By: Mary Russell RDCS     BSA: 2.1 m2  Height: 72 in  Weight: 191 lb  HR: 99  BP: 119/64 mmHg  ______________________________________________________________________________  Procedure  Complete Portable Echo Adult.  ______________________________________________________________________________  Interpretation Summary     Left ventricular systolic function is normal.  The visual ejection fraction is 60-65%.  The right ventricle is normal in structure, function and size.  Valve structures without significant dysfunction. No apparent valvular  vegetations.  The inferior vena cava was normal in size with preserved respiratory  variability.  There is no pericardial effusion.     No prior study for comparison. Consider LINDA if clnically indicated to assess  for endocarditis.  ______________________________________________________________________________  Left Ventricle  The left ventricle is normal in structure, function and size. Left ventricular  systolic function is normal. The visual ejection fraction is 60-65%. Normal  left ventricular wall motion.     Right Ventricle  The right ventricle is normal in structure, function and size.     Atria  Normal left atrial size. Right atrial size is normal.     Mitral Valve  The mitral  valve is normal in structure and function.     Tricuspid Valve  The tricuspid valve is normal in structure and function. Right ventricular  systolic pressure could not be approximated due to inadequate tricuspid  regurgitation.     Aortic Valve  The aortic valve is normal in structure and function.     Pulmonic Valve  The pulmonic valve is normal in structure and function.     Vessels  The aortic root is normal size. Normal size ascending aorta. The inferior vena  cava was normal in size with preserved respiratory variability.     Pericardium  There is no pericardial effusion.     ______________________________________________________________________________  MMode/2D Measurements & Calculations  IVSd: 1.1 cm  LVIDd: 4.7 cm  LVIDs: 2.8 cm  LVPWd: 0.97 cm  FS: 39.5 %     LV mass(C)d: 169.1 grams  LV mass(C)dI: 80.9 grams/m2  Ao root diam: 3.4 cm  LA dimension: 2.9 cm  asc Aorta Diam: 2.9 cm  LA/Ao: 0.84  RWT: 0.41     Doppler Measurements & Calculations  MV E max jreri: 84.9 cm/sec  MV A max jerri: 96.1 cm/sec  MV E/A: 0.88  MV dec time: 0.13 sec  PA acc time: 0.12 sec  E/E' av.5  Lateral E/e': 8.5  Medial E/e': 8.6     ______________________________________________________________________________  Report approved by: Kwaku Mota 2022 02:08 PM               Duplex Ultrasound Right Lower Extremity 10/14/2022    Impression:  1. Acute occlusive DVT of one of the paired posterior tibial veins  from the proximal calf to ankle.     2. The other posterior tibial vein with acute DVT in the mid calf.      TTE 22  Interpretation Summary     Left ventricular systolic function is normal.  The visual ejection fraction is 60-65%.  The right ventricle is normal in structure, function and size.  Valve structures without significant dysfunction. No apparent valvular  vegetations.  The inferior vena cava was normal in size with preserved respiratory  variability.  There is no pericardial effusion.     No prior  study for comparison. Consider LINDA if clnically indicated to assess  for endocarditis.  ______________________________________________________________________________  Left Ventricle  The left ventricle is normal in structure, function and size. Left ventricular  systolic function is normal. The visual ejection fraction is 60-65%. Normal  left ventricular wall motion.     Right Ventricle  The right ventricle is normal in structure, function and size.     Atria  Normal left atrial size. Right atrial size is normal.     Mitral Valve  The mitral valve is normal in structure and function.     Tricuspid Valve  The tricuspid valve is normal in structure and function. Right ventricular  systolic pressure could not be approximated due to inadequate tricuspid  regurgitation.     Aortic Valve  The aortic valve is normal in structure and function.     Pulmonic Valve  The pulmonic valve is normal in structure and function.     Vessels  The aortic root is normal size. Normal size ascending aorta. The inferior vena  cava was normal in size with preserved respiratory variability.     Pericardium  There is no pericardial effusion.

## 2022-11-03 NOTE — ANESTHESIA PROCEDURE NOTES
Airway       Patient location during procedure: OR  Staff -        CRNA: Hany Heaton APRN CRNA       Performed By: CRNA  Consent for Airway        Urgency: elective  Indications and Patient Condition       Indications for airway management: francisca-procedural       Induction type:intravenous       Mask difficulty assessment: 1 - vent by mask    Final Airway Details       Final airway type: supraglottic airway    Supraglottic Airway Details        Type: LMA       Brand: Ambu AuraGain       LMA size: 5    Post intubation assessment        Placement verified by: capnometry and chest rise        Number of attempts at approach: 1       Ease of procedure: easy       Dentition: Unchanged

## 2022-11-03 NOTE — ANESTHESIA CARE TRANSFER NOTE
Patient: Sanket Guerrero    Procedure: Procedure(s):  RIGHT KNEE OPEN IRRIGATION AND DEBRIDEMENT WITH SYNOVECTOMY  AND MANIPULATION UNDER ANESTHESIA       Diagnosis: Arthrofibrosis of knee joint, right [M24.661]  Hemarthrosis of right knee [M25.061]  Diagnosis Additional Information: No value filed.    Anesthesia Type:   General     Note:    Oropharynx: oropharynx clear of all foreign objects and spontaneously breathing  Level of Consciousness: drowsy  Oxygen Supplementation: face mask  Level of Supplemental Oxygen (L/min / FiO2): 8  Independent Airway: airway patency satisfactory and stable  Dentition: dentition unchanged  Vital Signs Stable: post-procedure vital signs reviewed and stable  Report to RN Given: handoff report given  Patient transferred to: PACU    Handoff Report: Identifed the Patient, Identified the Reponsible Provider, Reviewed the pertinent medical history, Discussed the surgical course, Reviewed Intra-OP anesthesia mangement and issues during anesthesia, Set expectations for post-procedure period and Allowed opportunity for questions and acknowledgement of understanding      Vitals:  Vitals Value Taken Time   /101 11/03/22 1758   Temp 36.2  C (97.1  F) 11/03/22 1758   Pulse 92 11/03/22 1758   Resp 14 11/03/22 1758   SpO2 100 % 11/03/22 1758   Vitals shown include unvalidated device data.    Electronically Signed By: RESHMA Almendarez CRNA  November 3, 2022  6:00 PM

## 2022-11-03 NOTE — PATIENT INSTRUCTIONS
Good luck on your surgery today and we wish you a speedy recovery!    Follow up with Rheumatology appointment with Brittney Barrett PA-C on 11/22/22  Follow up with Dr. Hahn and your primary care provider on when to restart Eliquis for DVT  TTE is ordered in the chart and will be available when you check in for surgery today; we will follow up with you if anything is abnormal  Follow up as needed; please contact our clinic to schedule an appointment  Have a low threshold for heading to the Emergency Department if you are feeling worse in any way  Watch for signs of post-surgical infection including: redness around surgical incision, the incision opening up and or draining fluid that is foul-smell or purulent

## 2022-11-03 NOTE — INTERVAL H&P NOTE
I have reviewed the surgical (or preoperative) H&P that is linked to this encounter, and examined the patient. There are no significant changes    Clinical Conditions Present on Arrival:  Clinically Significant Risk Factors Present on Admission                # Hypoalbuminemia: Lowest albumin = 3.1 g/dL (Ref range: 3.5-5.2) in the past 30 days , will monitor as appropriate   # Drug Induced Coagulation Defect: home medication list includes an anticoagulant medication

## 2022-11-03 NOTE — LETTER
Date:November 3, 2022      Patient was self referred, no letter generated. Do not send.        [facility] Health Information

## 2022-11-03 NOTE — ANESTHESIA PROCEDURE NOTES
Adductor canal Procedure Note    Pre-Procedure   Staff -        Anesthesiologist:  Xavier Crews MD       Performed By: anesthesiologist       Location: pre-op       Procedure Start/Stop Times: 11/3/2022 6:00 AM and 11/3/2022 6:03 PM       Pre-Anesthestic Checklist: patient identified, IV checked, site marked, risks and benefits discussed, informed consent, monitors and equipment checked, pre-op evaluation, at physician/surgeon's request and post-op pain management  Timeout:       Correct Patient: Yes        Correct Procedure: Yes        Correct Site: Yes        Correct Position: Yes        Correct Laterality: Yes        Site Marked: Yes  Procedure Documentation  Procedure: Adductor canal       Laterality: right       Patient Position: supine       Skin prep: Chloraprep       Local skin infiltrated with 3 mL of 1% lidocaine.        Needle Type: other       Needle Gauge: 20.        Needle Length (Inches): 6        Ultrasound guided       1. Ultrasound was used to identify targeted nerve, plexus, vascular marker, or fascial plane and place a needle adjacent to it in real-time.       2. Ultrasound was used to visualize the spread of anesthetic in close proximity to the above referenced structure.       3. A permanent image is entered into the patient's record.       4. The visualized anatomic structures appeared normal.       5. There were no apparent abnormal pathologic findings.    Assessment/Narrative         The placement was negative for: blood aspirated, painful injection and site bleeding       Paresthesias: No.       Test dose of 3 mL at.         Test dose negative, 3 minutes after injection, for signs of intravascular, subdural, or intrathecal injection.       Bolus given via needle. no blood aspirated via catheter.        Secured via.        Insertion/Infusion Method: Single Shot       Complications: none       Injection made incrementally with aspirations every 5 mL.    Medication(s) Administered  "  Medication Administration Time: 11/3/2022 6:00 AM      FOR [facility] ONLY:   Pain Team Contact information: please page the Pain Team Via [employer]. Search \"Pain\". During daytime hours, please page the attending first. At night please page the resident first.    "

## 2022-11-03 NOTE — PROGRESS NOTES
[facility]  Transplant Infectious Disease Clinic Note:  Follow up video visit     Patient:  Sanket Guerrero, Date of birth 1990, Medical record number [id number]  Date of Visit:  11/03/2022  Consult requested by Dr. Braxton for evaluation of spontaneous septic arthritis of native right knee and left sternoclavicular joint.    Video start: 9:54 AM  Video end: 10:16 AM  [employer]         Assessment and Recommendations:   Recommendations:  1. Follow up with Rheumatology appointment with Brittney Barrett PA-C on 11/22/22  2. Follow up with Dr. Hahn and your primary care provider on when to restart Eliquis for DVT  3. TTE is ordered in the chart and will be available when you check in for surgery today  4. Follow up as needed; please contact our clinic to schedule an appointment  5. Have a low threshold for heading to the Emergency Department if you are feeling worse in any way  6. Watch for signs of post-surgical infection including: redness around surgical incision, the incision opening up and or draining fluid that is foul-smell or purulent      Assessment:    Sanket Guerrero is a 32 year old male with a PMH significant for gout who presents today following a recent hospitalization (9/25/22-10/5/22) for septic arthritis (Strep agalactiae) of his native right knee and left sternoclavicular joint septic arthritis s/p multiple washouts and c/b transaminitis. Concern for underlying rheumatologic condition that could increase the patient's risk for spontaneous septic arthritis was considered by Dr. Hahn and ourselves.     Continues to have intermittent low-grade temperatures of 99.3 with nightly sweats. Higher fevers and nightsweats. Most recent labs show CRP trending down, an ferritin that continues to be elevated; potentially related to right knee hematoma vs underlying autoimmune inflammatory process. Normal white blood cell count; neutropenia potentially related to IV ceftriaxone now  resolved. Anemia also resolving and perhaps related to ceftriaxone. Adding ceftriaxone as a possible cause of cytopenia to Sanket's allergy list. Blood cultures and synovial fluid aspirate cultures (noted by surgeon to be a small sample size d/t coagulated blood) from 10/28 with no growth to date. Other rheumatologic and infectious work up negative. Considered that his initial daily fevers and drenching nightsweats (throughout hospitalization and outpatient) despite appropriate targeted therapy for his septic arthritis might be related to a drug-fever in the setting of IV ceftriaxone. Patient will follow up with Rheumatology in the near future to undergo a thorough workup for underlying autoimmune condition that may have predisposed him to being at higher risk for spontaneous septic arthritis of two non contiguous joints. Will repeat TTE to ensure no endocarditis under the suspicion that the patient was likely bacteremic prior to hospitalization. Order is placed and he may have this performed while he is hospitalized overnight for his right knee surgery today.      Ultrasound right lower extremity 10/14/2022 positive for 2 acute occlusive clots present in the paired posterior tibial veins proximal calf to ankle and then in the other posterior tibial vein in the mid calf. Continuing with PCP for management of DVT with Eliquis. Continue to use ibuprofen or Celebrex sparingly while on Eliquis. Concern for increased risk of bleeding with Eliquis and Celebrex or ibuprofen.       Recommend to patient and fiance that they have a low threshold for returning to the ED if any symptoms worsen. I am concerned with the continue systemic symptoms there is a source of infection we have yet to identify versus a rheumatologic process.     Sanket is set to have right open-knee surgery with Dr. Hahn today at 2pm to remove the coagulated blood. Left a voicemail with Dr. Hahn's nurse that if there is any synovial fluid to sample,  we would like aerobic and anaerobic cultures along with a cell count and analysis.    Sanket is feeling much improved, with labs returning to normal, and repeat cultures negative. He has been antibiotic free for over a week without worsening of his symptoms. His infection is clear from our standpoint and does not need regular follow up. He may schedule to follow up with us at any point if he has future concerns. We will contact him if anything abnormal returns with the repeat TTE.     I spent 64 minutes as part of a video visit on the date of the encounter doing chart review, history and exam, documentation.        RESHMA Freitas, CNP  Infectious Diseases  Pager# [phone]           Interval History:   Sanket Guerrero is a 32 year old male with a PMH significant for gout who presents today following a recent hospitalization (9/25/22-10/5/22) for septic arthritis (Strep agalactiae) of his native right knee and left sternoclavicular joint septic arthritis s/p multiple washouts and c/b transaminitis.     11/3/22: Very infrequent fevers (99.3F max), daily nightsweats but not drenching, no chills. Still having pruritus with both arms, right knee with small red bumps. Hasn't improved since stopping the Amoxicillin. Rash/hives improve with a damp cloth. Put Elliquis on hold two days ago.      10/24/2022: fevers have resolved, over the past 5 days. Nightsweats have improved, less frequent but still daily, last was this early morning. Left leg cramps improved with walking, worse first thing in the AM (on day 10-11 of Eliquis). Right knee, unable to bend the knee fully due to swelling. Pain has improved since starting antibiotics. Occasional intermittent sharp pain in right hip, but goes away by itself, no swelling.    Rash began Thursday on right arm where PICC line sleeve was. Started the Amoxicillin Friday and itching and rash spread across his body, then PICC removed Saturday. Denies any pain or issues with the PICC line.  "Notes some difficulty swallowing though transient since starting amoxicillin. Tried benadryl two nights ago, which helped with the itching but the hives were persistent. Tried hydrocortisone cream and no relief.     States he was let go from his job related to being out for medical reasons.    Denies headache, cough, sore, throat, difficulty breathing, nausea, abdominal pain, diarrhea, dysuria.     10/14/2022, still having nightsweats and fevers (101 at max) at night. Ok all day but the nighttime is the worst. And shaking chills for the last two days. Feels better during the day when taking ceftriaxone. Intermittent right calf soreness \"leg cramp\", that goes away on its own but returns. Good appetite, but has lost about 15lbs since hospitalization. Denies nausea, vomiting, abdominal pain, diarrhea, dysuria, acute rashes, arthralgia, myalgias.    Seen by Dr. Hahn (10/13) who removed the stitches, performed another aspiration of the right knee. Removed 80ccs of fluid and blood. Spoke with Dr. Hahn today (10/14), who noted that initial right knee aspirate (did not look purulent, but more inflammatory (9/25). He also is concerned about the possibility of a rheumatological etiology for this patient's long history of monoarticular joint effusions acting as a potential risk factor for his recent episode of septic arthritis and ongoing fevers and nightsweats, significantly elevated acute phase reactants, despite appropriate antibiotic therapy and source control.        History of the Infectious Disease lllness:     Thought initially he had food poisoning, ate some leftover food that he kept in his car on a hot day, began to develop symptoms rapidly throughout that day (9/21). That evening developed rigors, chills, fever, nightsweats, vomiting (9/21), then developed pain initially in his left shoulder near the left collar bone (9/22), right knee pain developed (9/23).     Presented to [facility] [place] with several days of right knee and left-sided chest wall pain and swelling with associated fever (101.3), and recent nausea and vomiting. Blood cultures (9/25), joint aspirate cell count (>300,000 cells), crystal analysis (negative), and cultures were drawn. Started on IV vancomycin and cefepime (9/26). Right knee synovial aspirate was positive with 1+ Strep agalactiae (pan-sensitive) on Day 2 of incubation. Repeat synovial fluid culture (9/26) finalized with no growth to date. Curbside consult (9/27) with Dr. Guthrie, who provided further instructions on infectious workup and antibiotic narrowing. Repeat right knee aspirate (10/2) sent for gram stain, aerobic, and anaerobic culture, with Gram stain negative and repeat cultures finalized with no growth to date after 11 days. Left sternoclavicular aspirate culture grew 1+ Strep agalactiae collected (9/26) and positive on Day 1 of incubation. Daily blood cultures (9/25 - 10/1) finalized with no growth to date. Other negative infectious workup includes: urinalysis, Chlamydia trachomatis/Neisseria gonorrheae PCR, SARS-CoV2 PCR, Influenza A and B PCR, MRSA nares, lyme DNA PCR, Hep B surface Agn, Hep A IgM antibody, Hep C antibody. Hep B surface antibody reactive with >1,000.00.    Right knee s/p I&D (9/26, 9/28, 10/2)  Left Sternoclavicular joint I&D (9/28, 10/2)    Throughout the patient's hospitalization he continued to have daily fevers with a Tmax of 102.9 on 10/1. CRP significantly elevated (309.97) on admission and trended down to 206.4 on discharge (10/5); leukocytosis of 12.5 on admission but resolving (10/4); ESR elevated on admissoion. Developed transaminitis with elevated alk phos and LFTs (9/27), which continued to rise and began trending down after 10/4.      CT  right knee with contrast on 9/25/22 showed Massive knee joint effusion. Mild osteoarthrosis of the patellofemoral joint.  CT sternum SC joints (9/27/22) showed moderate effusion  "left sternoclavicular joint w/overlying soft tissue stranding. No abscess or osteomyelitis  Remaining scans are negative   TTE: negative signs of endocarditis, no valvular vegetations, pericardial effusion. Normal EF      History of monoarticular \"flare ups\" with swelling and pain that will last several days, then resolve on their own. Has had this for about 12 years. Denies any heat to the joint or erythema during these flare ups.      Childhood healthy, played lots of contact sports but denies any major injuries requiring surgery or joint injections.    Grandfather history of gout, DMII. Father hypercholesterolemia. Mom otherwise healthy.    HOME/ENVIRONMENT: house, no concerns for pests or mold    OCCUPATION: Information technology    TRAVEL: born in [place], traveled to [place] in august. No other recent travel outside [place]    OUTDOOR ACTIVITIES:   - Camping: no, but enjoys fishing (June)  - Cave exploring: no   - Walking barefoot on soil or grass: no  - Swimming or wading in rivers, lakes or streams: no this year  - Hot tubs or Jacuzzis: no    ANIMALS: one dog, no farm animal exposure    FOOD/WATER:   - Raw undercooked meat or sushi: eats sushi once a month  - Unwashed fruits or vegetables: yes  - Unpasteurized milk: no  - Well water: no    DENTAL WORK: no    TUBERCULOSIS:   - Known TB exposures: no  - Prior TB testing results: no  - Healthcare work: no : no  - Homelessness: no  - skilled nursing: no    TOBACCO/ALCOHOL/RECREATIONAL DRUGS: occasional alcoholic drink    SEXUAL ACTIVITY: yes, one-partner    Review of systems is otherwise negative. Pertinent positives and negatives listed above.      Past Medical History:   Diagnosis Date     Chronic infection        Past Surgical History:   Procedure Laterality Date     ARTHROSCOPY KNEE INCISION AND DRAINAGE Bilateral 9/26/2022    Procedure: Right knee arthroscopic irrigation and debridement, partial synovectomy and left sternoclavicular joint needle aspiration;  " Surgeon: Jovani Hahn MD;  Location: [facility] OR     ARTHROSCOPY KNEE IRRIGATION AND DEBRIDEMENT Right 9/28/2022    Procedure: Right IRRIGATION AND DEBRIDEMENT, KNEE, ARTHROSCOPIC;  Surgeon: Jovani Hahn MD;  Location: [facility] OR     ARTHROSCOPY KNEE IRRIGATION AND DEBRIDEMENT Right 10/2/2022    Procedure: IRRIGATION AND DEBRIDEMENT, KNEE, ARTHROSCOPIC RIGHT;  Surgeon: Zeke Vazquez MD;  Location: [facility] OR     IRRIGATION AND DEBRIDEMENT NECK, COMBINED Left 9/28/2022    Procedure: Open IRRIGATION AND DEBRIDEMENT of Sternal Clavicular Joint Left;  Surgeon: Jovani Hahn MD;  Location: [facility] OR     IRRIGATION AND DEBRIDEMENT SHOULDER, COMBINED Left 10/2/2022    Procedure: Left Sternoclavicular Joint Irrigation And Debridement;  Surgeon: Zeke Vazquez MD;  Location: [facility] OR       No family history on file.    Social History     Social History Narrative     Not on file     Social History     Tobacco Use     Smoking status: Former     Types: Cigarettes     Smokeless tobacco: Never     Tobacco comments:     Minimal smoking   Substance Use Topics     Alcohol use: Not Currently     Drug use: Never       Immunization History   Administered Date(s) Administered     COVID-19,PF,Moderna 04/14/2021, 05/12/2021       Patient Active Problem List   Diagnosis     Left-sided chest wall pain     Effusion of right knee joint     Febrile illness     Septic arthritis of knee, right (H)       No outpatient medications have been marked as taking for the 11/3/22 encounter (Appointment) with Lily Azevedo APRN CNP.       Allergies   Allergen Reactions     Amoxicillin Rash     Unsure if amoxicillin was the cause, started multiple other medications around the day he started this medication. Also endorsed some intermittent throat tightening and difficulty swallowing.               Physical Exam:   Vitals were reviewed.  All vitals stable  There were no vitals taken for this visit.  Wt Readings from Last 4 Encounters:    10/26/22 78 kg (172 lb)   10/24/22 78.5 kg (173 lb)   10/14/22 79.6 kg (175 lb 8 oz)   09/26/22 86.9 kg (191 lb 9.3 oz)     No vitals taken; this was a video visit  Exam:   GENERAL: Healthy, alert and no distress  EYES: Eyes grossly normal to inspection.  No discharge or erythema, or obvious scleral/conjunctival abnormalities.  RESP: No audible wheeze, cough, or visible cyanosis.  No visible retractions or increased work of breathing.    SKIN: Visible skin clear. No significant rash, abnormal pigmentation or lesions.  NEURO: Cranial nerves grossly intact.  Mentation and speech appropriate for age.  PSYCH: Mentation appears normal, affect normal/bright, judgement and insight intact, normal speech and appearance well-groomed.               Laboratory Data:     Inflammatory Markers    Recent Labs   Lab Test 10/28/22  1340 10/24/22  1637 10/19/22  1515 10/14/22  1137 10/05/22  0508 10/04/22  0541 10/02/22  0516 10/01/22  0614 09/30/22  0601 09/27/22  0454 09/25/22  1854   SED  --   --   --   --  124*  --   --   --   --   --  73*   CRP 17.09* 35.20* 28.71* 51.40*  --  206.40* 217.99* 222.72* 213.18*   < > 309.97*    < > = values in this interval not displayed.       Metabolic Studies    Recent Labs   Lab Test 10/28/22  1340 10/24/22  1637 10/19/22  1515 10/14/22  1137 10/05/22  0508 10/04/22  0541 10/01/22  1522    136 137 137   < > 136  --    POTASSIUM 3.8 4.1 4.2 3.9   < > 4.3  --    CHLORIDE 97* 98 100 98   < > 98  --    CO2 28 26 25 26   < > 25  --    ANIONGAP 11 12 12 13   < > 13  --    BUN 9.4 10.6 10.9 11.6   < > 11.6  --    CR 0.90 0.86 0.80 0.82   < > 0.72  --    GFRESTIMATED >90 >90 >90 >90   < > >90  --    * 94 122* 99   < > 137*  --    JOCELYNE 9.7 9.8 9.3 9.9   < > 9.1  --    URIC  --   --   --  4.3  --   --   --    LACT  --   --   --   --   --   --  1.2   CKT  --   --   --   --   --  50  --     < > = values in this interval not displayed.       Hepatic Studies    Recent Labs   Lab Test  10/28/22  1340 10/24/22  1637 10/19/22  1515 10/14/22  1137 10/05/22  0508 10/04/22  1730   BILITOTAL 0.3 0.3 0.2 0.4   < > 0.4   DBIL  --   --   --   --   --  <0.20   ALKPHOS 64 73 80 120   < > 263*   PROTTOTAL 9.3* 9.4* 8.5* 9.4*   < > 7.9   ALBUMIN 3.9 4.0 3.5 3.9   < > 3.1*   AST 29 37 72* 71*   < > 146*   ALT 46 64* 102* 119*   < > 284*   LDH  --   --   --  297*  --   --     < > = values in this interval not displayed.       Gout Labs      Recent Labs   Lab Test 10/14/22  1137 09/25/22  1854   URIC 4.3 3.9       Hematology Studies   Recent Labs   Lab Test 10/28/22  1340 10/24/22  1637 10/19/22  1515 10/14/22  1127   WBC 5.7 3.7* 2.7* 3.0*   ANEU  --   --  0.6*  --    ANEUTAUTO 2.8 0.4*  --  1.1*   ALYM  --   --  1.3  --    ALYMPAUTO 1.9 2.1  --  1.4   BLAIR  --   --  0.6  --    AMONOAUTO 0.8 1.0  --  0.5   AEOS  --   --  0.2  --    AEOSAUTO 0.2 0.1  --  0.0   ABSBASO 0.0 0.1  --  0.0   HGB 11.0* 10.8* 9.1* 10.4*   HCT 35.5* 34.8* 28.8* 32.4*    445 432 339       Clotting Studies    Recent Labs   Lab Test 10/04/22  1730   INR 1.14       Iron Testing    Recent Labs   Lab Test 10/28/22  1340 10/24/22  1637 10/19/22  1515 10/14/22  1137 10/05/22  0508 10/04/22  1730   IRON  --   --   --   --   --  12*   FEB  --   --   --   --   --  184*   IRONSAT  --   --   --   --   --  7*   RUBY 599* 503*  --  1,838*  --  1,328*   MCV 86 84   < >  --    < >  --     < > = values in this interval not displayed.         Urine Studies     Recent Labs   Lab Test 10/04/22  1955   URINEPH 7.0   NITRITE Negative   LEUKEST Negative       Medication levels    Recent Labs   Lab Test 09/28/22  2141   VANCOMYCIN <4.0           Microbiology:     Last Culture results   Culture   Date Value Ref Range Status   10/28/2022 No Growth  Final   10/28/2022 No anaerobic organisms isolated after 5 days  Preliminary   10/28/2022 No Growth  Final   10/28/2022 No Growth  Final   10/14/2022 No Growth  Final   10/14/2022 No Growth  Final   10/02/2022 No  anaerobic organisms isolated  Final   10/02/2022 No Growth  Final   10/01/2022 No Growth  Final   09/30/2022 No Growth  Final   09/30/2022 No Growth  Final   09/29/2022 No Growth  Final   09/28/2022 No Growth  Final   09/27/2022 No Growth  Final   09/26/2022 No anaerobic organisms isolated  Final   09/26/2022 No Growth  Final   09/26/2022 No anaerobic organisms isolated  Final   09/26/2022 (A)  Final    1+ Streptococcus agalactiae (Group B Streptococcus)     Comment:     Susceptibilities done on previous cultures   09/26/2022 No Growth  Final   09/25/2022 (A)  Final    1+ Streptococcus agalactiae (Group B Streptococcus)           Syphilis Testing  Invalid input(s): [id number]      Virology:  Coronavirus-19 testing    Recent Labs   Lab Test 09/25/22  1543   [id number] Negative           Hepatitis B Testing     Recent Labs   Lab Test 10/14/22  1137 10/04/22  1730   AUSAB  --  >1,000.00   HBCAB Nonreactive  --    HEPBANG  --  Nonreactive     Was the last Hepatitis B E antigen positive?   No results found for: HBEAGN     Hepatitis C Antibody   Date Value Ref Range Status   10/04/2022 Nonreactive Nonreactive Final           Imaging:  Results for orders placed or performed during the hospital encounter of 09/25/22   XR Chest 2 Views    Narrative    EXAM: XR CHEST 2 VIEWS  LOCATION: [facility]  DATE/TIME: 9/25/2022 4:18 PM    INDICATION: Left chest and shoulder pain  COMPARISON: None.      Impression    IMPRESSION: Cardiomediastinal silhouette is normal. Normal vasculature. Lungs and pleural spaces are clear. No fracture evident.   XR Knee Right 3 Views    Narrative    EXAM: XR KNEE RIGHT 3 VIEWS  LOCATION: [facility]  DATE/TIME: 9/25/2022 4:16 PM    INDICATION: pain  COMPARISON: None.      Impression    IMPRESSION: Normal joint spaces and alignment. No fracture. Moderate joint effusion.   CT Knee Right w Contrast    Narrative    EXAM: CT KNEE RIGHT W CONTRAST  LOCATION:  [facility]  DATE/TIME: 9/25/2022 8:46 PM    INDICATION: Right distal femur anterior swelling and knee swelling. Rule out abscess or other abnormality.  COMPARISON: Radiographs from 9/25/2022.  TECHNIQUE: IV contrast. Axial, sagittal and coronal thin-section reconstruction. Dose reduction techniques were used.   CONTRAST: 100 mL Isovue 370.    FINDINGS:     BONES AND JOINTS:  -There is a massive knee joint effusion. No calcified intra-articular body. No fracture. Mild osteoarthrosis of the patellofemoral joint.    SOFT TISSUES:  -No hematoma, cyst or mass. No gross muscle or tendon pathology. No evidence of abscess.      Impression    IMPRESSION:  1.  Massive knee joint effusion.  2.  Mild osteoarthrosis of the patellofemoral joint.  3.  Otherwise negative.     CT Sternum SC Joints without Contrast    Narrative    EXAM: CT STERNUM SC JOINTS WITHOUT CONTRAST  LOCATION: [facility]  DATE/TIME: 9/27/2022 6:28 PM    INDICATION: Chest pain. Left sternoclavicular joint effusion tapped and positive for Group B Strep, evaluate for extent of septic effusion inflammation.  COMPARISON: None.  TECHNIQUE: With IV contrast. Axial, sagittal and coronal thin-section reconstruction. Dose reduction techniques were used.   CONTRAST: 85 mL Isovue-300 were administered.    FINDINGS:     BONES:  -No fracture. No definite evidence of osteomyelitis. Small accessory ossicle at the cephalad margin of the manubrium. Normal alignment.     SOFT TISSUES:  -Moderate-sized effusion within the left sternoclavicular joint with mild overlying soft tissue swelling and localized soft tissue stranding consistent with the patient's history of septic arthritis. No adenopathy. No discrete fluid collection to suggest   an abscess. Visualized portions of both upper lungs normal. Mediastinum unremarkable. Normal appearing right sternoclavicular joint.      Impression    IMPRESSION:  1.  No evidence of an  abscess or osteomyelitis.    2.  Moderate-sized effusion within the left sternoclavicular joint with overlying soft tissue stranding compatible with the history of septic arthritis.     CT Soft tissue neck w contrast*    Narrative    EXAM: CT SOFT TISSUE NECK W CONTRAST  LOCATION: [facility]  DATE/TIME: 10/1/2022 4:25 PM    INDICATION: Recurrent fevers. Polyarticular infectious/inflammatory process, eval for additional abscess or infection  COMPARISON: None.  CONTRAST: 50 mL Isovue 370  TECHNIQUE: Routine CT Soft Tissue Neck with IV contrast. Multiplanar reformats. Dose reduction techniques were used.    FINDINGS:     MUCOSAL SPACES/SOFT TISSUES: Normal mucosal spaces of the upper aerodigestive tract. No mucosal mass or inflammation identified. Normal vocal cords and infraglottic trachea. Normal parapharyngeal space and  spaces. Normal oral cavity,    spaces, and floor of mouth structures.    LYMPH NODES: Shotty bilateral cervical lymph nodes, none frankly pathologic by size or morphology criteria.     SALIVARY GLANDS: Normal parotid and submandibular glands.    THYROID: Normal.     VESSELS: Vascular structures of the neck are patent.    VISUALIZED INTRACRANIAL/ORBITS/SINUSES: No abnormality of the visualized intracranial compartment or orbits. Visualized paranasal sinuses and mastoid air cells are clear.    OTHER: Left sternoclavicular joint effusion without aggressive/erosive osseous changes. Overlying cutaneous fat stranding and small locules of subcutaneous gas consistent with surgical drainage. Otherwise unremarkable visualized osseous structures. The   included lung apices are clear.      Impression    IMPRESSION:   1.  Persistent left sternoclavicular joint effusion as seen on dedicated CT from 09/27/2022. Changes of interval surgical drainage without evidence for aggressive osseous erosion/destruction.  2.  No additional evidence for an infectious/inflammatory  process in the neck.  3.  Nonspecific shotty cervical lymph nodes are presumably reactive in nature.   CT Chest/Abdomen/Pelvis w Contrast    Narrative    EXAM: CT CHEST/ABDOMEN/PELVIS W CONTRAST  LOCATION: [facility]  DATE/TIME: 10/1/2022 4:35 PM    INDICATION: Fever, eval for abscess or infection.  COMPARISON: None.  TECHNIQUE: CT scan of the chest, abdomen, and pelvis was performed following injection of IV contrast. Multiplanar reformats were obtained. Dose reduction techniques were used.   CONTRAST: 50 mL Isovue 370.    FINDINGS:   LUNGS AND PLEURA: Normal.    MEDIASTINUM/AXILLAE: Normal.    CORONARY ARTERY CALCIFICATION: None.    HEPATOBILIARY: Normal.    PANCREAS: Normal.    SPLEEN: Normal.    ADRENAL GLANDS: Normal.    KIDNEYS/BLADDER: Normal.    BOWEL: Normal.    LYMPH NODES: Normal.    VASCULATURE: Unremarkable.    PELVIC ORGANS: Normal.    MUSCULOSKELETAL: Soft tissue swelling overlying the left sternoclavicular joint with a bubble of air present superficially. This could be due to infection. There is no evidence for osteomyelitis.      Impression    IMPRESSION:  1.  Soft tissue swelling left sternoclavicular joint with some soft tissue air present suspicious for infection. No CT evidence for osteomyelitis.   CT Head w contrast    Narrative    EXAM: CT HEAD W CONTRAST  LOCATION: [facility]  DATE/TIME: 10/1/2022 4:44 PM    INDICATION: Recurrent fevers. Concern for polyarticular septic arthritis. Eval for abscess or infection  COMPARISON: None.  CONTRAST: 25 mL Isovue 370  TECHNIQUE: Routine CT Head with IV contrast.  Dose reduction techniques were used.    FINDINGS:  INTRACRANIAL CONTENTS: No intracranial hemorrhage, extraaxial collection, or mass effect.  No CT evidence of acute infarct. Normal parenchymal attenuation. Normal ventricles and sulci. No pathologic enhancement identified.    VISUALIZED ORBITS/SINUSES/MASTOIDS: No intraorbital abnormality.  No paranasal sinus mucosal disease. No middle ear or mastoid effusion.    BONES/SOFT TISSUES: No acute abnormality.      Impression    IMPRESSION:  1.  No evidence for acute intracranial process.   US Lower Extremity Venous Duplex Bilateral    Narrative    EXAM: US LOWER EXTREMITY VENOUS DUPLEX BILATERAL  LOCATION: [facility]  DATE/TIME: 10/1/2022 5:19 PM    INDICATION: Pain and swelling.  COMPARISON: None.  TECHNIQUE: Venous Duplex ultrasound of bilateral lower extremities with and without compression, augmentation and duplex. Color flow and spectral Doppler with waveform analysis performed.    FINDINGS: Exam includes the common femoral, femoral, popliteal veins as well as segmentally visualized deep calf veins and greater saphenous vein.     RIGHT: No deep vein thrombosis. No superficial thrombophlebitis. No popliteal cyst.    LEFT: No deep vein thrombosis. No superficial thrombophlebitis. No popliteal cyst.      Impression    IMPRESSION:  1.  No deep venous thrombosis in the bilateral lower extremities.   XR Chest Port 1 View    Narrative    XR CHEST PORT 1 VIEW   10/4/2022 1:59 PM     HISTORY: RN placed PICC - verify tip placement    COMPARISON: CT 10/1/2022.      Impression    IMPRESSION: Placement of right upper extremity PICC, tip overlying the  superior vena cava. Normal cardiomediastinal silhouette. Lungs are  clear. No acute bony abnormality.    HELIO ACOSTA MD         SYSTEM ID:  [id number]   US Abdomen Limited    Narrative    EXAM: US ABDOMEN LIMITED  LOCATION: [facility]  DATE/TIME: 10/4/2022 7:23 PM    INDICATION: Increasing liver enzymes. Mild right upper quadrant tenderness.  COMPARISON: CT abdomen pelvis 10/01/2022.  TECHNIQUE: Limited abdominal ultrasound.    FINDINGS:    GALLBLADDER: Normal. No gallstones, wall thickening, or pericholecystic fluid. Negative sonographic Ramos's sign.    BILE DUCTS: No biliary dilatation. The common duct  measures 4 mm.    LIVER: Normal parenchyma with smooth contour. No focal mass.    RIGHT KIDNEY: No hydronephrosis.    PANCREAS: The visualized portions are normal.    No ascites.      Impression    IMPRESSION:    1.  Normal limited abdominal ultrasound.   Echocardiogram Complete     Value    LVEF  60-65%    Narrative    [id number]  [id number]  [id number]  [id number]^AGNESLA^MELANY^MADHU     [facility]  Echocardiography Laboratory  [address]  [place] [address]     Name: SEDRICK TONY  MRN: [id number]  : 1990  Study Date: 2022 10:59 AM  Age: 32 yrs  Gender: Male  Patient Location: [facility]  Reason For Study: Endocarditis  Ordering Physician: MELANY MALDONADO  Performed By: Mary Russell RDCS     BSA: 2.1 m2  Height: 72 in  Weight: 191 lb  HR: 99  BP: 119/64 mmHg  ______________________________________________________________________________  Procedure  Complete Portable Echo Adult.  ______________________________________________________________________________  Interpretation Summary     Left ventricular systolic function is normal.  The visual ejection fraction is 60-65%.  The right ventricle is normal in structure, function and size.  Valve structures without significant dysfunction. No apparent valvular  vegetations.  The inferior vena cava was normal in size with preserved respiratory  variability.  There is no pericardial effusion.     No prior study for comparison. Consider LINDA if clnically indicated to assess  for endocarditis.  ______________________________________________________________________________  Left Ventricle  The left ventricle is normal in structure, function and size. Left ventricular  systolic function is normal. The visual ejection fraction is 60-65%. Normal  left ventricular wall motion.     Right Ventricle  The right ventricle is normal in structure, function and size.     Atria  Normal left atrial size. Right atrial size is normal.     Mitral Valve  The mitral  valve is normal in structure and function.     Tricuspid Valve  The tricuspid valve is normal in structure and function. Right ventricular  systolic pressure could not be approximated due to inadequate tricuspid  regurgitation.     Aortic Valve  The aortic valve is normal in structure and function.     Pulmonic Valve  The pulmonic valve is normal in structure and function.     Vessels  The aortic root is normal size. Normal size ascending aorta. The inferior vena  cava was normal in size with preserved respiratory variability.     Pericardium  There is no pericardial effusion.     ______________________________________________________________________________  MMode/2D Measurements & Calculations  IVSd: 1.1 cm  LVIDd: 4.7 cm  LVIDs: 2.8 cm  LVPWd: 0.97 cm  FS: 39.5 %     LV mass(C)d: 169.1 grams  LV mass(C)dI: 80.9 grams/m2  Ao root diam: 3.4 cm  LA dimension: 2.9 cm  asc Aorta Diam: 2.9 cm  LA/Ao: 0.84  RWT: 0.41     Doppler Measurements & Calculations  MV E max jerri: 84.9 cm/sec  MV A max jerri: 96.1 cm/sec  MV E/A: 0.88  MV dec time: 0.13 sec  PA acc time: 0.12 sec  E/E' av.5  Lateral E/e': 8.5  Medial E/e': 8.6     ______________________________________________________________________________  Report approved by: Kwaku Mota 2022 02:08 PM               Duplex Ultrasound Right Lower Extremity 10/14/2022    Impression:  1. Acute occlusive DVT of one of the paired posterior tibial veins  from the proximal calf to ankle.     2. The other posterior tibial vein with acute DVT in the mid calf.      TTE 22  Interpretation Summary     Left ventricular systolic function is normal.  The visual ejection fraction is 60-65%.  The right ventricle is normal in structure, function and size.  Valve structures without significant dysfunction. No apparent valvular  vegetations.  The inferior vena cava was normal in size with preserved respiratory  variability.  There is no pericardial effusion.     No prior  study for comparison. Consider LINDA if clnically indicated to assess  for endocarditis.  ______________________________________________________________________________  Left Ventricle  The left ventricle is normal in structure, function and size. Left ventricular  systolic function is normal. The visual ejection fraction is 60-65%. Normal  left ventricular wall motion.     Right Ventricle  The right ventricle is normal in structure, function and size.     Atria  Normal left atrial size. Right atrial size is normal.     Mitral Valve  The mitral valve is normal in structure and function.     Tricuspid Valve  The tricuspid valve is normal in structure and function. Right ventricular  systolic pressure could not be approximated due to inadequate tricuspid  regurgitation.     Aortic Valve  The aortic valve is normal in structure and function.     Pulmonic Valve  The pulmonic valve is normal in structure and function.     Vessels  The aortic root is normal size. Normal size ascending aorta. The inferior vena  cava was normal in size with preserved respiratory variability.     Pericardium  There is no pericardial effusion.

## 2022-11-03 NOTE — PROGRESS NOTES
Sanket is a 32 year old who is being evaluated via a billable video visit.      How would you like to obtain your AVS? [employer]  If the video visit is dropped, the invitation should be resent by: Text to cell phone: [phone]  Will anyone else be joining your video visit? No        Video-Visit Details    Video Start Time: 9:54    Type of service:  Video Visit    Video End Time:10:16    Originating Location (pt. Location): Home        Distant Location (provider location):  On-site    Platform used for Video Visit: Sam

## 2022-11-04 ENCOUNTER — APPOINTMENT (OUTPATIENT)
Dept: CARDIOLOGY | Facility: CLINIC | Age: 32
End: 2022-11-04
Attending: FAMILY MEDICINE
Payer: COMMERCIAL

## 2022-11-04 ENCOUNTER — APPOINTMENT (OUTPATIENT)
Dept: PHYSICAL THERAPY | Facility: CLINIC | Age: 32
End: 2022-11-04
Attending: ORTHOPAEDIC SURGERY
Payer: COMMERCIAL

## 2022-11-04 ENCOUNTER — APPOINTMENT (OUTPATIENT)
Dept: CT IMAGING | Facility: CLINIC | Age: 32
End: 2022-11-04
Attending: FAMILY MEDICINE
Payer: COMMERCIAL

## 2022-11-04 ENCOUNTER — APPOINTMENT (OUTPATIENT)
Dept: OCCUPATIONAL THERAPY | Facility: CLINIC | Age: 32
End: 2022-11-04
Attending: ORTHOPAEDIC SURGERY
Payer: COMMERCIAL

## 2022-11-04 LAB
ANION GAP SERPL CALCULATED.3IONS-SCNC: 10 MMOL/L (ref 5–18)
BASOPHILS # BLD AUTO: 0 10E3/UL (ref 0–0.2)
BASOPHILS NFR BLD AUTO: 0 %
BUN SERPL-MCNC: 10 MG/DL (ref 8–22)
C REACTIVE PROTEIN LHE: 2.1 MG/DL (ref 0–?)
CALCIUM SERPL-MCNC: 9.6 MG/DL (ref 8.5–10.5)
CHLORIDE BLD-SCNC: 103 MMOL/L (ref 98–107)
CO2 SERPL-SCNC: 27 MMOL/L (ref 22–31)
CREAT SERPL-MCNC: 0.83 MG/DL (ref 0.7–1.3)
EOSINOPHIL # BLD AUTO: 0 10E3/UL (ref 0–0.7)
EOSINOPHIL NFR BLD AUTO: 0 %
ERYTHROCYTE [DISTWIDTH] IN BLOOD BY AUTOMATED COUNT: 14.4 % (ref 10–15)
ERYTHROCYTE [DISTWIDTH] IN BLOOD BY AUTOMATED COUNT: 14.6 % (ref 10–15)
ERYTHROCYTE [SEDIMENTATION RATE] IN BLOOD BY WESTERGREN METHOD: 80 MM/HR (ref 0–15)
GFR SERPL CREATININE-BSD FRML MDRD: >90 ML/MIN/1.73M2
GLUCOSE BLD-MCNC: 133 MG/DL (ref 70–125)
GRAM STAIN RESULT: NORMAL
GRAM STAIN RESULT: NORMAL
HCT VFR BLD AUTO: 31.6 % (ref 40–53)
HCT VFR BLD AUTO: 33.4 % (ref 40–53)
HGB BLD-MCNC: 10.5 G/DL (ref 13.3–17.7)
HGB BLD-MCNC: 9.8 G/DL (ref 13.3–17.7)
HOLD SPECIMEN: NORMAL
IMM GRANULOCYTES # BLD: 0 10E3/UL
IMM GRANULOCYTES NFR BLD: 0 %
INR PPP: 1.08 (ref 0.85–1.15)
LACTATE SERPL-SCNC: 2.1 MMOL/L (ref 0.7–2)
LACTATE SERPL-SCNC: 3.1 MMOL/L (ref 0.7–2)
LVEF ECHO: NORMAL
LYMPHOCYTES # BLD AUTO: 1.1 10E3/UL (ref 0.8–5.3)
LYMPHOCYTES NFR BLD AUTO: 18 %
MCH RBC QN AUTO: 26.5 PG (ref 26.5–33)
MCH RBC QN AUTO: 26.6 PG (ref 26.5–33)
MCHC RBC AUTO-ENTMCNC: 31 G/DL (ref 31.5–36.5)
MCHC RBC AUTO-ENTMCNC: 31.4 G/DL (ref 31.5–36.5)
MCV RBC AUTO: 84 FL (ref 78–100)
MCV RBC AUTO: 86 FL (ref 78–100)
MONOCYTES # BLD AUTO: 0.3 10E3/UL (ref 0–1.3)
MONOCYTES NFR BLD AUTO: 5 %
NEUTROPHILS # BLD AUTO: 4.6 10E3/UL (ref 1.6–8.3)
NEUTROPHILS NFR BLD AUTO: 77 %
NRBC # BLD AUTO: 0 10E3/UL
NRBC BLD AUTO-RTO: 0 /100
PLATELET # BLD AUTO: 175 10E3/UL (ref 150–450)
PLATELET # BLD AUTO: 285 10E3/UL (ref 150–450)
POTASSIUM BLD-SCNC: 4.6 MMOL/L (ref 3.5–5)
RBC # BLD AUTO: 3.69 10E6/UL (ref 4.4–5.9)
RBC # BLD AUTO: 3.96 10E6/UL (ref 4.4–5.9)
SODIUM SERPL-SCNC: 140 MMOL/L (ref 136–145)
UFH PPP CHRO-ACNC: 0.33 IU/ML
WBC # BLD AUTO: 16.4 10E3/UL (ref 4–11)
WBC # BLD AUTO: 6 10E3/UL (ref 4–11)

## 2022-11-04 PROCEDURE — 99222 1ST HOSP IP/OBS MODERATE 55: CPT | Performed by: INTERNAL MEDICINE

## 2022-11-04 PROCEDURE — 97530 THERAPEUTIC ACTIVITIES: CPT | Mod: GP

## 2022-11-04 PROCEDURE — 250N000011 HC RX IP 250 OP 636: Performed by: ORTHOPAEDIC SURGERY

## 2022-11-04 PROCEDURE — 250N000011 HC RX IP 250 OP 636: Performed by: FAMILY MEDICINE

## 2022-11-04 PROCEDURE — 85520 HEPARIN ASSAY: CPT | Performed by: FAMILY MEDICINE

## 2022-11-04 PROCEDURE — 85027 COMPLETE CBC AUTOMATED: CPT | Performed by: FAMILY MEDICINE

## 2022-11-04 PROCEDURE — 250N000011 HC RX IP 250 OP 636

## 2022-11-04 PROCEDURE — 85025 COMPLETE CBC W/AUTO DIFF WBC: CPT | Performed by: ORTHOPAEDIC SURGERY

## 2022-11-04 PROCEDURE — 80048 BASIC METABOLIC PNL TOTAL CA: CPT | Performed by: ORTHOPAEDIC SURGERY

## 2022-11-04 PROCEDURE — 36415 COLL VENOUS BLD VENIPUNCTURE: CPT | Performed by: FAMILY MEDICINE

## 2022-11-04 PROCEDURE — 250N000013 HC RX MED GY IP 250 OP 250 PS 637: Performed by: ORTHOPAEDIC SURGERY

## 2022-11-04 PROCEDURE — 83605 ASSAY OF LACTIC ACID: CPT | Performed by: FAMILY MEDICINE

## 2022-11-04 PROCEDURE — 99214 OFFICE O/P EST MOD 30 MIN: CPT | Performed by: FAMILY MEDICINE

## 2022-11-04 PROCEDURE — 97535 SELF CARE MNGMENT TRAINING: CPT | Mod: GO

## 2022-11-04 PROCEDURE — 85652 RBC SED RATE AUTOMATED: CPT | Performed by: FAMILY MEDICINE

## 2022-11-04 PROCEDURE — 255N000002 HC RX 255 OP 636: Performed by: ORTHOPAEDIC SURGERY

## 2022-11-04 PROCEDURE — 97162 PT EVAL MOD COMPLEX 30 MIN: CPT | Mod: GP

## 2022-11-04 PROCEDURE — 86200 CCP ANTIBODY: CPT | Performed by: INTERNAL MEDICINE

## 2022-11-04 PROCEDURE — 86140 C-REACTIVE PROTEIN: CPT | Performed by: FAMILY MEDICINE

## 2022-11-04 PROCEDURE — 71275 CT ANGIOGRAPHY CHEST: CPT

## 2022-11-04 PROCEDURE — 36415 COLL VENOUS BLD VENIPUNCTURE: CPT | Performed by: ORTHOPAEDIC SURGERY

## 2022-11-04 PROCEDURE — 87040 BLOOD CULTURE FOR BACTERIA: CPT | Performed by: FAMILY MEDICINE

## 2022-11-04 PROCEDURE — 85610 PROTHROMBIN TIME: CPT | Performed by: FAMILY MEDICINE

## 2022-11-04 PROCEDURE — 258N000003 HC RX IP 258 OP 636: Performed by: FAMILY MEDICINE

## 2022-11-04 PROCEDURE — 93306 TTE W/DOPPLER COMPLETE: CPT | Mod: 26 | Performed by: INTERNAL MEDICINE

## 2022-11-04 PROCEDURE — 97116 GAIT TRAINING THERAPY: CPT | Mod: GP

## 2022-11-04 PROCEDURE — 97166 OT EVAL MOD COMPLEX 45 MIN: CPT | Mod: GO

## 2022-11-04 RX ORDER — APIXABAN 5 MG (74)
KIT ORAL
Qty: 70 EACH | Refills: 3
Start: 2022-11-04 | End: 2023-02-09

## 2022-11-04 RX ORDER — DIPHENHYDRAMINE HYDROCHLORIDE 50 MG/ML
25 INJECTION INTRAMUSCULAR; INTRAVENOUS ONCE
Status: COMPLETED | OUTPATIENT
Start: 2022-11-04 | End: 2022-11-04

## 2022-11-04 RX ORDER — CEPHALEXIN 500 MG/1
500 CAPSULE ORAL 2 TIMES DAILY
Qty: 14 CAPSULE | Refills: 0 | Status: SHIPPED | OUTPATIENT
Start: 2022-11-04 | End: 2022-11-05

## 2022-11-04 RX ORDER — OXYCODONE HYDROCHLORIDE 5 MG/1
5 TABLET ORAL EVERY 4 HOURS PRN
Qty: 30 TABLET | Refills: 0 | Status: SHIPPED | OUTPATIENT
Start: 2022-11-04 | End: 2022-11-04

## 2022-11-04 RX ORDER — ACETAMINOPHEN 500 MG
1000 TABLET ORAL EVERY 6 HOURS
Start: 2022-11-04

## 2022-11-04 RX ORDER — OXYCODONE HYDROCHLORIDE 5 MG/1
5 TABLET ORAL EVERY 4 HOURS PRN
Qty: 30 TABLET | Refills: 0 | Status: SHIPPED | OUTPATIENT
Start: 2022-11-04 | End: 2023-02-16

## 2022-11-04 RX ORDER — HEPARIN SODIUM 10000 [USP'U]/100ML
0-5000 INJECTION, SOLUTION INTRAVENOUS CONTINUOUS
Status: DISCONTINUED | OUTPATIENT
Start: 2022-11-04 | End: 2022-11-05

## 2022-11-04 RX ORDER — HEPARIN SODIUM 10000 [USP'U]/100ML
0-5000 INJECTION, SOLUTION INTRAVENOUS CONTINUOUS
Status: DISCONTINUED | OUTPATIENT
Start: 2022-11-04 | End: 2022-11-04 | Stop reason: CLARIF

## 2022-11-04 RX ORDER — DIPHENHYDRAMINE HYDROCHLORIDE 50 MG/ML
INJECTION INTRAMUSCULAR; INTRAVENOUS
Status: COMPLETED
Start: 2022-11-04 | End: 2022-11-04

## 2022-11-04 RX ORDER — HYDROXYZINE PAMOATE 25 MG/1
25 CAPSULE ORAL 3 TIMES DAILY PRN
Qty: 30 CAPSULE | Refills: 0 | Status: SHIPPED | OUTPATIENT
Start: 2022-11-04 | End: 2023-06-30

## 2022-11-04 RX ORDER — IOPAMIDOL 755 MG/ML
100 INJECTION, SOLUTION INTRAVASCULAR ONCE
Status: COMPLETED | OUTPATIENT
Start: 2022-11-04 | End: 2022-11-04

## 2022-11-04 RX ORDER — SODIUM CHLORIDE 9 MG/ML
INJECTION, SOLUTION INTRAVENOUS CONTINUOUS
Status: DISCONTINUED | OUTPATIENT
Start: 2022-11-04 | End: 2022-11-05

## 2022-11-04 RX ADMIN — SENNOSIDES AND DOCUSATE SODIUM 1 TABLET: 50; 8.6 TABLET ORAL at 08:13

## 2022-11-04 RX ADMIN — SODIUM CHLORIDE: 9 INJECTION, SOLUTION INTRAVENOUS at 16:51

## 2022-11-04 RX ADMIN — IOPAMIDOL 90 ML: 755 INJECTION, SOLUTION INTRAVENOUS at 12:20

## 2022-11-04 RX ADMIN — OXYCODONE HYDROCHLORIDE 5 MG: 5 TABLET ORAL at 05:34

## 2022-11-04 RX ADMIN — HEPARIN SODIUM 950 UNITS/HR: 10000 INJECTION, SOLUTION INTRAVENOUS at 15:31

## 2022-11-04 RX ADMIN — POLYETHYLENE GLYCOL 3350 17 G: 17 POWDER, FOR SOLUTION ORAL at 08:14

## 2022-11-04 RX ADMIN — OXYCODONE HYDROCHLORIDE 5 MG: 5 TABLET ORAL at 09:56

## 2022-11-04 RX ADMIN — CEFAZOLIN 1 G: 1 INJECTION, POWDER, FOR SOLUTION INTRAMUSCULAR; INTRAVENOUS at 00:16

## 2022-11-04 RX ADMIN — ACETAMINOPHEN 975 MG: 325 TABLET, FILM COATED ORAL at 20:02

## 2022-11-04 RX ADMIN — SENNOSIDES AND DOCUSATE SODIUM 1 TABLET: 50; 8.6 TABLET ORAL at 20:02

## 2022-11-04 RX ADMIN — OXYCODONE HYDROCHLORIDE 5 MG: 5 TABLET ORAL at 00:13

## 2022-11-04 RX ADMIN — ACETAMINOPHEN 975 MG: 325 TABLET, FILM COATED ORAL at 11:16

## 2022-11-04 RX ADMIN — PERFLUTREN 3 ML: 6.52 INJECTION, SUSPENSION INTRAVENOUS at 14:45

## 2022-11-04 RX ADMIN — DIPHENHYDRAMINE HYDROCHLORIDE 25 MG: 50 INJECTION INTRAMUSCULAR; INTRAVENOUS at 11:53

## 2022-11-04 RX ADMIN — HYDROXYZINE HYDROCHLORIDE 25 MG: 25 TABLET, FILM COATED ORAL at 10:06

## 2022-11-04 RX ADMIN — CEFAZOLIN 1 G: 1 INJECTION, POWDER, FOR SOLUTION INTRAMUSCULAR; INTRAVENOUS at 08:13

## 2022-11-04 RX ADMIN — HYDROXYZINE HYDROCHLORIDE 25 MG: 25 TABLET, FILM COATED ORAL at 00:13

## 2022-11-04 RX ADMIN — ACETAMINOPHEN 975 MG: 325 TABLET, FILM COATED ORAL at 04:04

## 2022-11-04 RX ADMIN — SODIUM CHLORIDE 1000 ML: 9 INJECTION, SOLUTION INTRAVENOUS at 13:10

## 2022-11-04 ASSESSMENT — ACTIVITIES OF DAILY LIVING (ADL)
DEPENDENT_IADLS:: INDEPENDENT
ADLS_ACUITY_SCORE: 36

## 2022-11-04 NOTE — OP NOTE
11/4/2022    PREOPERATIVE DIAGNOSIS: 1. Right knee hemarthrosis.  2.  Right knee arthrofibrosis    POSTOPERATIVE DIAGNOSIS: 1. Right knee hemarthrosis.  2.  Right knee arthrofibrosis    PROCEDURE:   1. Right knee open irrigation and debridement.  2. Right knee partial synovectomy  3. Right knee manipulation     SURGEON: Jovani Hahn MD    ASSISTANT: Cara Hernandez PA-c.  The presence of a PA was necessary for positioning retraction, and safe progression of the case    ANESTHESIA:  General    BLOOD LOSS: 300 cc    COMPLICATIONS: None apparent    DISPOSITION: to PACU and general floor cares    IMPLANTS: None    INDICATIONS: Sanket is a 32 year old male with history of recent right knee native septic arthritis, status post multiple arthroscopic irrigation and debridements, with postoperative arthrofibrosis.  After discussing treatment options, he elected to proceed with a Right knee open irrigation and debridement and manipulation under anesthesia  to improve his motion, understanding the risks of repeat surgery, possible fracture, infection, damage to vessels or nerves, and risks inherent to anesthesia.    PROCEDURE: Sanket was met in the preoperative holding area where the right knee was marked.  He was then transferred to the operating theater.  After smooth induction of general anesthesia, he was positioned supine.  A timeout was performed verifying the correct patient, surgery, and location.  He received preoperative antibiotics.  The right lower extremity was then prepped and draped in a standard sterile fashion.    We began with an anterior midline incision over the knee. Meticulous hemostasis was performed using electrocautery. A standard medial parapatellar arthrotomy was then made, ensuring not to damage the medial meniscus.  This immediately evacuated some old hemarthrosis. Tissue was sent for culture. A synovectomy was then performed, and removal of old coagulated blood. At this time, we then performed  our manipulation. We were able to flex the knee to 135 degrees, and fully straighten the knee. We irrigated the knee thoroughly with normal saline.  We then turned our attention again to obtaining hemostasis. He was generally quite oozy. There were no particular arterial or venous bleeders identified. To help with the oozing, we gave him topical TXA, as well as Surgicel powder. This did help with the generalized oozing and hemostasis.  We placed a drain.  We then closed the arthrotomy with 1 PDS sutures.  Deep dermal layer closed with 2-0 Monocryl, and skin closed with 3-0 nylon sutures.      Xeroform gauze and Aquacel dressing placed. Ace bandage applied to the leg.     POSTOPERATIVE PLAN:   We will have him WBAT with CPM machine, to start at 0-60 deg and progress as tolerated.  Will hold Eliquis until 48 hrs postop.   Perioperative ancef.  Follow up in 1 week in clinic

## 2022-11-04 NOTE — PROGRESS NOTES
[facility] MEDICINE PROGRESS NOTE      Identification/Summary: Sanket Guerrero is a 32 year old male with a past medical history of complex history with septic joints requiring 6 weeks of IV antibiotics and multiple interventions.  Presented for an I&D of a frozen knee.  Doing well postprocedure but developed tachycardia and rigors without fever.  He has a history of DVT and is now on low-dose heparin infusion so that I could be shut off if he develops bleeding.  Elevated lactate but no indication for antibiotics per infectious disease.    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 hours of 11/5     History of DVT  Provoked post procedure  Relatively asymptomatic  No history of pulmonary embolism  Developed tachycardia concerning for pulmonary embolism 11/4  CT PE run without PE  Discussed directly with surgeon  We will pursue a low-dose non-bolused heparin overnight tonight  Can likely transition to oral Eliquis if no significant bleeding tomorrow as above    Tachycardia/elevated lactic acid/elevated white count  Consulted ID  Fluid bolus  Increase fluid rate  Trend lactic -repeat p.m. 10/4  Low threshold to start aggressive antibiotics and transition to high  level of care    Abnormal CT chest  Findings seen at the sternoclavicular joint and in the clavicle itself  Discussed directly with ID  Likely from previous infection that was treated  Continue to monitor  No antibiotics for now per ID                 # Drug Induced Coagulation Defect: home medication list includes an anticoagulant medication                Diet: Advance Diet as Tolerated: Regular Diet Adult  Discharge Instruction - Regular Diet Adult  DVT Prophylaxis:  Hep gtt  Code Status: Full Code    Anticipated possible discharge in when hemodynamically stable  Disposition Plan      Expected Discharge Date: 11/05/2022,  3:00 PM    Destination: home          The patient's care was discussed with the Bedside Nurse, Care Coordinator/, Patient and ID and orthopedic consultants.    Sheng Shafer MD  [facility]  Phone: #[phone]    Interval History/Subjective:  Patient is feeling okay.  Developed tachycardia and elevated white count.  Lactic acid ordered which is also elevated.  CT PE run negative although concerned about area at the sternoclavicular joint and the clavicle itself.  Discussed directly with infectious disease.  Bolus and recheck lactic acid.  Trend heart rate and watch for fever.  No indication for antibiotics and currently per ID.    Physical Exam/Objective:  Temp:  [97.1  F (36.2  C)-100.4  F (38  C)] 98.5  F (36.9  C)  Pulse:  [] 129  Resp:  [7-18] 18  BP: ()/() 99/54  SpO2:  [96 %-100 %] 97 %  Body mass index is 22.6 kg/m .    GENERAL:  Alert, appears comfortable, in no acute distress, appears stated age   HEAD:  Normocephalic, without obvious abnormality, atraumatic   EYES:  PERRL, conjunctiva/corneas clear, no scleral icterus, EOM's intact   NOSE: Nares normal, septum midline, mucosa normal, no drainage   BACK:   Symmetric, no curvature, ROM normal   LUNGS:   Clear to auscultation bilaterally, no  rales, rhonchi, or wheezing, symmetric chest rise on inhalation, respirations unlabored   CHEST WALL:  No tenderness or deformity   HEART:  Regular rate and rhythm, S1 and S2 normal, no murmur, rub, or gallop    ABDOMEN:   Soft, non-tender, bowel sounds active all four quadrants, no masses, no organomegaly, no rebound or guarding   EXTREMITIES: Extremities normal, atraumatic, no cyanosis or edema    SKIN: Dry to touch, no exanthems in the visualized areas   NEURO: Alert, oriented x3, moves all four extremities freely   PSYCH: Cooperative, behavior is appropriate      Data reviewed today: I personally reviewed all new medications, labs, imaging/diagnostics reports over the past 24 hours. Pertinent findings include:    Imaging:   Recent Results (from the past 24 hour(s))   CT Chest Pulmonary Embolism w Contrast    Narrative    EXAM: CT CHEST PULMONARY EMBOLISM W CONTRAST  LOCATION: [facility]  DATE/TIME: 11/4/2022 12:28 PM    INDICATION: Tachycardia.  COMPARISON: 10/01/2022.  TECHNIQUE: CT chest pulmonary angiogram during arterial phase injection of IV contrast. Multiplanar reformats and MIP reconstructions were performed. Dose reduction techniques were used.   CONTRAST: Isovue  370 90mL.    FINDINGS:  ANGIOGRAM CHEST: No pulmonary embolism. Nonaneurysmal aorta without dissection.    LUNGS AND PLEURA: Lungs are clear. No pleural effusion or pneumothorax.    MEDIASTINUM/AXILLAE: Few upper normal sized nodes. Normal heart size. No pericardial effusion.    CORONARY ARTERY CALCIFICATION: Compromised by motion and contrast.    UPPER ABDOMEN: Nothing acute.    MUSCULOSKELETAL: Mild stranding along the left sternoclavicular joint again noted. Interval cortical irregularity along the anterior and medial left clavicle in this area.      Impression    IMPRESSION:    1.  Since 10/01/2022, persistent soft tissue stranding along the left sternoclavicular joint with interval bony cortical irregularity and  lucency of the medial left clavicle; assuming no intervention since 10/2022, findings suggest osteomyelitis.   Infection along the sternoclavicular joint also possible. Recommend MRI.    2.  No pulmonary embolism.    3.  No other findings to explain symptoms.    NOTE: ABNORMAL REPORT    THE DICTATION ABOVE DESCRIBES AN ABNORMALITY FOR WHICH FOLLOW-UP IS NEEDED.    Echocardiogram Complete   Result Value    LVEF  60-65%    Narrative    [id number]  [id number]  [id number]  [id number]^MANUEL^[place] [address]     Name: SEDRICK TONY  MRN: [id number]  : 1990  Study Date: 2022 02:14 PM  Age: 32 yrs  Gender: Male  Patient Location: [facility]  Reason For Study: Tachycardia  Ordering Physician: MOHAN JACKSON  Performed By: MITCHEL     BSA: 2.0 m2  Height: 72 in  Weight: 172 lb  HR: 130  ______________________________________________________________________________  Procedure  Complete Echo Adult. Definity (NDC #93061-106) given intravenously.  ______________________________________________________________________________  Interpretation Summary     1. The left ventricle is normal in size. Left ventricular function is  normal.The ejection fraction is 60-65%.  2. Normal right ventricle size and systolic function.  3. No hemodynamically significant valvular abnormalities on 2D or color flow  imaging.  ______________________________________________________________________________  Left Ventricle  The left ventricle is normal in size. Left ventricular function is normal.The  ejection fraction is 60-65%. There is normal left ventricular wall thickness.  Left ventricular diastolic function is normal. No regional wall motion  abnormalities noted.     Right Ventricle  Normal right ventricle size and systolic function.     Atria  Normal left atrial size. Right atrial size is normal. There is no color  Doppler evidence of an atrial shunt.     Mitral Valve  Mitral valve leaflets  appear normal. There is no evidence of mitral stenosis  or clinically significant mitral regurgitation.     Tricuspid Valve  Tricuspid valve leaflets appear normal. There is no evidence of tricuspid  stenosis or clinically significant tricuspid regurgitation. Right ventricle  systolic pressure estimate normal.     Aortic Valve  The aortic valve is trileaflet. Aortic valve leaflets appear normal. There is  no evidence of aortic stenosis or clinically significant aortic regurgitation.     Pulmonic Valve  The pulmonic valve is not well seen, but is grossly normal. This degree of  valvular regurgitation is within normal limits. There is trace pulmonic  valvular regurgitation.     Vessels  The aorta root is normal. Normal size ascending aorta. IVC diameter <2.1 cm  collapsing >50% with sniff suggests a normal RA pressure of 3 mmHg.     Pericardium  There is no pericardial effusion.     ______________________________________________________________________________  MMode/2D Measurements & Calculations  IVSd: 0.81 cm  LVIDd: 3.9 cm  LVIDs: 3.1 cm  LVPWd: 1.2 cm     FS: 20.6 %  LV mass(C)d: 117.8 grams  LV mass(C)dI: 58.9 grams/m2  Ao root diam: 3.0 cm  LA dimension: 2.0 cm  asc Aorta Diam: 3.1 cm  LA/Ao: 0.67  LVOT diam: 2.1 cm  LVOT area: 3.4 cm2  LA Volume (BP): 33.3 ml  LA Volume Index (BP): 16.7 ml/m2     LA Volume Indexed (AL/bp): 18.8 ml/m2  RWT: 0.60     Doppler Measurements & Calculations  MV E max peter: 108.0 cm/sec  MV dec slope: 930.9 cm/sec2  MV dec time: 0.12 sec  Ao V2 max: 124.9 cm/sec  Ao max P.0 mmHg  Ao V2 mean: 77.2 cm/sec  Ao mean P.8 mmHg  Ao V2 VTI: 14.7 cm  KASI(I,D): 2.9 cm2  KASI(V,D): 2.0 cm2  LV V1 max P.2 mmHg  LV V1 max: 74.7 cm/sec  LV V1 VTI: 12.7 cm  SV(LVOT): 43.1 ml  SI(LVOT): 21.6 ml/m2  PA V2 max: 77.1 cm/sec  PA max P.4 mmHg  PA mean P.4 mmHg  PA V2 VTI: 12.2 cm  PA acc time: 0.11 sec  AV Peter Ratio (DI): 0.60  KASI Index (cm2/m2): 1.5     E/E': 5.9  E/E' avg:  6.3  Lateral E/e': 6.7  Medial E/e': 5.9  Peak E' Peter: 18.3 cm/sec     ______________________________________________________________________________  Report approved by: Kwaku Cano 11/04/2022 03:39 PM             Labs:  Most Recent 3 CBC's:Recent Labs   Lab Test 11/04/22  1529 11/04/22  0527 10/28/22  1340   WBC 16.4* 6.0 5.7   HGB 10.5* 9.8* 11.0*   MCV 84 86 86    285 398     Most Recent 3 BMP's:Recent Labs   Lab Test 11/04/22  0527 10/28/22  1340 10/24/22  1637    136 136   POTASSIUM 4.6 3.8 4.1   CHLORIDE 103 97* 98   CO2 27 28 26   BUN 10 9.4 10.6   CR 0.83 0.90 0.86   ANIONGAP 10 11 12   JOCELYNE 9.6 9.7 9.8   * 102* 94     Most Recent 2 LFT's:Recent Labs   Lab Test 10/28/22  1340 10/24/22  1637   AST 29 37   ALT 46 64*   ALKPHOS 64 73   BILITOTAL 0.3 0.3     Most Recent 3 INR's:Recent Labs   Lab Test 11/04/22  0527 10/04/22  1730   INR 1.08 1.14       Medications:   Personally Reviewed.  Medications     heparin 950 Units/hr (11/04/22 1531)     lactated ringers 75 mL/hr at 11/03/22 2132       acetaminophen  975 mg Oral Q8H     polyethylene glycol  17 g Oral Daily     senna-docusate  1 tablet Oral BID     sodium chloride (PF)  3 mL Intracatheter Q8H

## 2022-11-04 NOTE — DISCHARGE SUMMARY
Physician Discharge Summary     Patient ID:  Sanket BONE Ly  [id number]  32 year old  1990    Admit date: 11/3/2022    Discharge date and time: 11/6/2022     Admitting Physician: Jovani Hahn MD     Discharge Physician: Jovani Hahn MD    Admission Diagnoses: Arthrofibrosis of knee joint, right [M24.661]  Hemarthrosis of right knee [M25.061]  Aftercare following surgery of the musculoskeletal system [Z47.89]    Discharge Diagnoses: Arthrofibrosis of right knee joint    Admission Condition: good    Discharged Condition: good    Indication for Admission: Observation status post open irrigation and debridement, manipulation of right knee    Hospital Course: Mr. Virgen was admitted to the hospital.  He underwent right knee open irrigation and debridement with manipulation.  He then was transferred to the general orthopedic care floor.  On postoperative day 1 he did develop tachycardia, rigors, and an associated rash.  Because of this, a CT chest was obtained, which ruled out pulmonary embolism.  Echocardiogram was also performed and was normal.  Internal medicine colleagues were following the patient.  Infectious disease was consulted as well.  Once medically stable, and able to mobilize, voiding spontaneously, pain controlled, and meeting all discharge checklist items, the patient was discharged from the hospital.    Consults: internal medicine, infectious disease    Significant Diagnostic Studies: microbiology: wound culture: pending    Treatments: antibiotics: Ancef and surgery: as above    Discharge Exam:  GENERAL APPEARANCE: healthy, alert and no distress  EYES: Eyes grossly normal to inspection, PERRL and conjunctivae and sclerae normal  HENT: ear canals and TM's normal, nose and mouth without ulcers or lesions, oropharynx clear and oral mucous membranes moist  NECK: no adenopathy, no asymmetry, masses, or scars and thyroid normal to palpation  RESP: lungs clear to auscultation - no rales, rhonchi or  wheezes  CV: regular rates and rhythm, normal S1 S2, no S3 or S4, no murmur, click or rub, no peripheral edema and peripheral pulses strong  ABDOMEN: soft, nontender, no hepatosplenomegaly, no masses and bowel sounds normal   (male): normal male genitalia without lesions or urethral discharge, no hernia  RECTAL: normal sphincter tone, no rectal masses, prostate of normal size, smooth, nontender without nodules or masses  MS: Right knee with Ace bandage in place.  Passive motion to 50 degrees flexion on exam. distally he is neurovascular intact  SKIN: no suspicious lesions or rashes  NEURO: Normal strength and tone, sensory exam grossly normal, mentation intact and speech normal  PSYCH: mentation appears normal and affect normal/bright    Disposition: home    Patient Instructions:     Current Outpatient Medications   Medication Instructions     acetaminophen (TYLENOL) 1,000 mg, Oral, EVERY 6 HOURS     apixaban ANTICOAGULANT (ELIQUIS) 5 mg, Oral, 2 TIMES DAILY     cetirizine (ZYRTEC) 10 mg, Oral, DAILY PRN     hydrOXYzine (VISTARIL) 25 mg, Oral, 3 TIMES DAILY PRN     loratadine (CLARITIN) 10 mg, Oral, DAILY PRN     oxyCODONE (ROXICODONE) 5 mg, Oral, EVERY 4 HOURS PRN, 1 tab for pain 4-7/10, 2 tabs for pain 8-10/10         Activity: activity as tolerated, must wear CPM machine > 6 hrs per day.  The continuous passive motion (CPM) machine is an important piece of equipment for your rehabilitation. It is designed to be an additive therapeutic exercise to assist in passive range of motion, provide joint nutrition and prevent adhesion (scar) formation. The CPM will be rented to you by a medical supply company (through your insurance) for about 2 weeks post-surgery. You can expect the company to call you the afternoon you return from the hospital and they will set up the machine and instruct you to how to use it. You are to use the CPM minimum 6 hours per day. This may be split into sessions or used at night time. We ask  that initially you begin at 70 degrees and increase the degrees of flexion daily by 10  until you can easily reach 120  (or the maximum of your particular machine). Once you have reached the maximum bending angle for 2 -3 days you no longer need to use the CPM. You can call the company that provided the machine to take it away.   Diet: regular diet  Wound Care: keep wound clean and dry    Follow-up with Dr. Hahn in 1 week.    Signed:  Jovani Hahn MD  11/4/2022  7:13 AM

## 2022-11-04 NOTE — PROGRESS NOTES
11/04/22 0730   Appointment Info   Signing Clinician's Name / Credentials (OT) Jodi Davis OTR/L   Living Environment   People in Home significant other   Current Living Arrangements house   Home Accessibility stairs to enter home   Number of Stairs, Main Entrance 3   Self-Care   Usual Activity Tolerance good   Current Activity Tolerance good   Equipment Currently Used at Home grab bar, tub/shower   Instrumental Activities of Daily Living (IADL)   IADL Comments shares with significant other   General Information   Onset of Illness/Injury or Date of Surgery 11/03/22   Referring Physician Dr. Jovani Hahn   Patient/Family Therapy Goal Statement (OT) return to PLOF   Additional Occupational Profile Info/Pertinent History of Current Problem moderate complexity   Existing Precautions/Restrictions no known precautions/restrictions   Cognitive Status Examination   Orientation Status orientation to person, place and time   Visual Perception   Visual Impairment/Limitations corrective lenses full-time   Sensory   Sensory Quick Adds sensation intact   Pain Assessment   Patient Currently in Pain No   Posture   Posture not impaired   Range of Motion Comprehensive   General Range of Motion bilateral upper extremity ROM WNL   Strength Comprehensive (MMT)   General Manual Muscle Testing (MMT) Assessment no strength deficits identified   Muscle Tone Assessment   Muscle Tone Quick Adds No deficits were identified   Coordination   Upper Extremity Coordination No deficits were identified   Bed Mobility   Bed Mobility supine-sit;sit-supine   Supine-Sit [place] (Bed Mobility) modified independence   Sit-Supine [place] (Bed Mobility) modified independence   Comment (Bed Mobility) Pt was able to demo good carryover of safe txr techniques   Transfers   Transfers toilet transfer   Toilet Transfer   Type (Toilet Transfer) stand-sit;sit-stand   [place] Level (Toilet Transfer) modified independence   Assistive Device  (Toilet Transfer) grab bars/safety frame   Toilet Transfer Comments Pt used grab bar to simulate counter in home setting with good safety and ability   Balance   Balance Assessment standing balance: dynamic   Balance Comments no LOB with FWW   Activities of Daily Living   BADL Assessment/Intervention lower body dressing   Lower Body Dressing Assessment/Training   Position (Lower Body Dressing) long sitting   Comment, (Lower Body Dressing) Pt was able to demo good carryover of dressing techniques   [place] Level (Lower Body Dressing) modified independence   Clinical Impression   Criteria for Skilled Therapeutic Interventions Met (OT) Yes, treatment indicated   OT Diagnosis pt's ADLs and fxl txrs affected s/p knee I&D   ADL comments/analysis Pt able to implement all compensatory strategies safely   Assessment of Occupational Performance 1-3 Performance Deficits   Planned Therapy Interventions (OT) ADL retraining;transfer training   Clinical Decision Making Complexity (OT) moderate complexity   Risk & Benefits of therapy have been explained evaluation/treatment results reviewed;patient   OT Total Evaluation Time   OT Eval, Moderate Complexity Minutes (22394) 15   OT Goals   Therapy Frequency (OT) One time eval and treatment   OT Predicted Duration/Target Date for Goal Attainment 11/04/22   OT Goals Toilet Transfer/Toileting   OT: Toilet Transfer/Toileting Modified independent;Goal Met;Completed   Interventions   Interventions Quick Adds Self-Care/Home Management   Self-Care/Home Management   Self-Care/Home Mgmt/ADL, Compensatory, Meal Prep Minutes (65392) 13   Symptoms Noted During/After Treatment (Meal Preparation/Planning Training) none   Treatment Detail/Skilled Intervention Pt in bed and agreeable to OT session upon arrival. Pt completed bed mob, LB dressing in long sitting, sit <> stand txrs, fxl mob with FWW around room, and toilet txr with MOD I and good implementation of compensatory strategies. Pt  displayed good act benji, dynamic balance, UE ROM and strength, and safe fxl ability throughout session. Pt verbalized and demo'd good carryover of education. No further need for OT services at this time.   OT Discharge Planning   OT Plan d/c   OT Discharge Recommendation (DC Rec) (S)  home with assist   OT Rationale for DC Rec Pt is able to complete ADL and fxl txrs with MOD I; IADL support in place from significant other   Total Session Time   Timed Code Treatment Minutes 13   Total Session Time (sum of timed and untimed services) 28

## 2022-11-04 NOTE — ANESTHESIA POSTPROCEDURE EVALUATION
Patient: Sanket Guerrero    Procedure: Procedure(s):  RIGHT KNEE OPEN IRRIGATION AND DEBRIDEMENT WITH SYNOVECTOMY  AND MANIPULATION UNDER ANESTHESIA       Anesthesia Type:  General    Note:     Postop Pain Control: Uneventful            Sign Out: Well controlled pain   PONV: No   Neuro/Psych: Uneventful            Sign Out: Acceptable/Baseline neuro status   Airway/Respiratory: Uneventful            Sign Out: Acceptable/Baseline resp. status   CV/Hemodynamics: Uneventful            Sign Out: Acceptable CV status; No obvious hypovolemia; No obvious fluid overload   Other NRE: NONE   DID A NON-ROUTINE EVENT OCCUR? No           Last vitals:  Vitals Value Taken Time   /86 11/03/22 1850   Temp 36.2  C (97.1  F) 11/03/22 1758   Pulse 96 11/03/22 1852   Resp 17 11/03/22 1852   SpO2 96 % 11/03/22 1852   Vitals shown include unvalidated device data.    Electronically Signed By: Xavier Crews MD  November 3, 2022  7:13 PM

## 2022-11-04 NOTE — CONSULTS
Care Management Initial Consult    General Information  Assessment completed with: Patient,    Type of CM/SW Visit: Initial Assessment    Primary Care Provider verified and updated as needed: Yes   Readmission within the last 30 days:        Reason for Consult: discharge planning  Advance Care Planning:            Communication Assessment  Patient's communication style: spoken language (English or Bilingual)             Cognitive  Cognitive/Neuro/Behavioral: WDL                      Living Environment:   People in home: significant other     Current living Arrangements: house      Able to return to prior arrangements: yes       Family/Social Support:  Care provided by: self  Provides care for: no one  Marital Status: Lives with Significant Other  Significant Other          Description of Support System: Supportive, Involved         Current Resources:   Patient receiving home care services: No     Community Resources: None  Equipment currently used at home: grab bar, toilet  Supplies currently used at home: None    Employment/Financial:  Employment Status:          Financial Concerns:     Referral to Financial Worker: No       Lifestyle & Psychosocial Needs:  Social Determinants of Health     Tobacco Use: Medium Risk     Smoking Tobacco Use: Former     Smokeless Tobacco Use: Never     Passive Exposure: Not on file   Alcohol Use: Not on file   Financial Resource Strain: Not on file   Food Insecurity: Not on file   Transportation Needs: Not on file   Physical Activity: Not on file   Stress: Not on file   Social Connections: Not on file   Intimate Partner Violence: Not on file   Depression: Not at risk     PHQ-2 Score: 0   Housing Stability: Not on file       Functional Status:  Prior to admission patient needed assistance:   Dependent ADLs:: Independent  Dependent IADLs:: Independent       Mental Health Status:  Mental Health Status: No Current Concerns       Chemical Dependency Status:  Chemical Dependency Status: No  Current Concerns             Values/Beliefs:  Spiritual, Cultural Beliefs, Orthodox Practices, Values that affect care: no               Care Management Discharge Note    Discharge Date: 11/04/2022        Discharge Disposition:  Home     Discharge Services:  None anticipated     Discharge DME: CPM     Discharge Transportation: family or friend will provide    Private pay costs discussed: Not applicable    PAS Confirmation Code:  N/A  Patient/family educated on Medicare website which has current facility and service quality ratings:  No     Education Provided on the Discharge Plan:  Yes Persons Notified of Discharge Plans: Pt   Patient/Family in Agreement with the Plan:  Yes     Handoff Referral Completed: Yes    Additional Information:  [facility] met and introduced self and CM services to Pt.  Pt lives in a house with significant other.  Pt will need a CPM upon discharge.  [facility] called and spoke with Hue at [employer] [phone] and has order for it.  Hue talked with Pt this morning.  Hue will have technician come to hospital before noon and deliver home CPM and do the settings and will assist loading in car if needed.  No other discharge needs anticipated. Family to transport.         YVONNE Starr

## 2022-11-04 NOTE — CONSULTS
[facility]  General ID Service Consult      Patient: Sanket Guerrero  YOB: 1990, MRN: [id number]  Date of Admission:  11/3/2022  Date of Consult: 11/04/2022  Consult Requested by: Jovani Hahn MD  Admission Diagnosis: Arthrofibrosis of knee joint, right [M24.661]  Hemarthrosis of right knee [M25.061]  Aftercare following surgery of the musculoskeletal system [Z47.89]      ID Assessment & Plan   S/p I and right knee  Findings :   Hemarthrosis, synovial thickening, no signs of obvious infection   DVT on Eliquis , aspiration attempted as outpt, no return    PLAN  continiu to hold abx as long as as negative cultures  followup with ID  Monitor temps    pls call w questions.    Candido Colon MD  [facility]  ______________________________________________________________________        History of Present Illness   Per ID note 11/3:  32 year old male with a PMH significant for gout who presents today following a recent hospitalization (9/25/22-10/5/22) for septic arthritis (Strep agalactiae) of his native right knee and left sternoclavicular joint septic arthritis s/p multiple washouts and c/b transaminitis. Concern for underlying rheumatologic condition that could increase the patient's risk for spontaneous septic arthritis was considered by Dr. Hahn and ourselves.      Continues to have intermittent low-grade temperatures of 99.3 with nightly sweats. Higher fevers and nightsweats. Most recent labs show CRP trending down, an ferritin that continues to be elevated; potentially related to right knee hematoma vs underlying autoimmune inflammatory process. Normal white blood cell count; neutropenia potentially related to IV ceftriaxone now resolved. Anemia also resolving and perhaps related to ceftriaxone. Adding ceftriaxone as a possible cause of cytopenia to Sanket's allergy list. Blood cultures and synovial fluid aspirate cultures (noted by surgeon to  "be a small sample size d/t coagulated blood) from 10/28 with no growth to date. Other rheumatologic and infectious work up negative. Considered that his initial daily fevers and drenching nightsweats (throughout hospitalization and outpatient) despite appropriate targeted therapy for his septic arthritis might be related to a drug-fever in the setting of IV ceftriaxone. Patient will follow up with Rheumatology in the near future to undergo a thorough workup for underlying autoimmune condition that may have predisposed him to being at higher risk for spontaneous septic arthritis of two non contiguous joints. Will repeat TTE to ensure no endocarditis under the suspicion that the patient was likely bacteremic prior to hospitalization. Order is placed and he may have this performed while he is hospitalized overnight for his right knee surgery today.        Ultrasound right lower extremity 10/14/2022 positive for 2 acute occlusive clots present in the paired posterior tibial veins proximal calf to ankle and then in the other posterior tibial vein in the mid calf. Continuing with PCP for management of DVT with Eliquis. Continue to use ibuprofen or Celebrex sparingly while on Eliquis. Concern for increased risk of bleeding with Eliquis and Celebrex or ibuprofen.         Recommend to patient and fiance that they have a low threshold for returning to the ED if any symptoms worsen. I am concerned with the continue systemic symptoms there is a source of infection we have yet to identify versus a rheumatologic process.      Sanket is set to have right open-knee surgery with Dr. Hahn today at 2pm to remove the coagulated blood. Left a voicemail with Dr. Hahn's nurse that if there is any synovial fluid to sample, we would like aerobic and anaerobic cultures along with a cell count and analysis.\"     He feels fine postop  No fever at home  But had some sweats  Left sternoclavicular site site healed fine    Radiology " personally reviewed  CT  IMPRESSION:     1.  Since 10/01/2022, persistent soft tissue stranding along the left sternoclavicular joint with interval bony cortical irregularity and lucency of the medial left clavicle; assuming no intervention since 10/2022, findings suggest osteomyelitis.   Infection along the sternoclavicular joint also possible. Recommend MRI.     2.  No pulmonary embolism.     3.  No other findings to explain symptoms.      Review of Systems   The 10 point Review of Systems is negative other than noted in the HPI or here.     Past Medical History    Past Medical History:   Diagnosis Date     Chronic infection      DVT (deep venous thrombosis) (H)        Past Surgical History   Past Surgical History:   Procedure Laterality Date     ARTHROSCOPY KNEE INCISION AND DRAINAGE Bilateral 9/26/2022    Procedure: Right knee arthroscopic irrigation and debridement, partial synovectomy and left sternoclavicular joint needle aspiration;  Surgeon: Jovani Hahn MD;  Location: [facility]     ARTHROSCOPY KNEE IRRIGATION AND DEBRIDEMENT Right 9/28/2022    Procedure: Right IRRIGATION AND DEBRIDEMENT, KNEE, ARTHROSCOPIC;  Surgeon: Jovani Hahn MD;  Location: [facility]     ARTHROSCOPY KNEE IRRIGATION AND DEBRIDEMENT Right 10/2/2022    Procedure: IRRIGATION AND DEBRIDEMENT, KNEE, ARTHROSCOPIC RIGHT;  Surgeon: Zeke Vazquez MD;  Location: [facility]     EXAM UNDER ANESTHESIA, MANIPULATE JOINT (LOCATION) Right 11/3/2022    Procedure: AND MANIPULATION UNDER ANESTHESIA;  Surgeon: Jovani Hahn MD;  Location: [facility]     IRRIGATION AND DEBRIDEMENT KNEE, COMBINED Right 11/3/2022    Procedure: RIGHT KNEE OPEN IRRIGATION AND DEBRIDEMENT WITH SYNOVECTOMY;  Surgeon: Jovani Hahn MD;  Location: [facility]     IRRIGATION AND DEBRIDEMENT NECK, COMBINED Left 9/28/2022    Procedure: Open IRRIGATION AND DEBRIDEMENT of Sternal Clavicular Joint Left;  Surgeon: Jovani Hahn MD;  Location: [facility]      IRRIGATION AND DEBRIDEMENT SHOULDER, COMBINED Left 10/2/2022    Procedure: Left Sternoclavicular Joint Irrigation And Debridement;  Surgeon: Zeke Vazquez MD;  Location: [facility]       Social History   Social History     Tobacco Use     Smoking status: Former     Types: Cigarettes     Smokeless tobacco: Never     Tobacco comments:     Minimal smoking   Substance Use Topics     Alcohol use: Not Currently     Drug use: Never       Family History         Medications   I have reviewed this patient's current medications    Allergies   Allergies   Allergen Reactions     Ceftriaxone Other (See Comments)     Neutropenia and possibly also anemia     Amoxicillin Rash     Unsure if amoxicillin was the cause, started multiple other medications around the day he started this medication. Also endorsed some intermittent throat tightening and difficulty swallowing.        Physical Exam   Vital Signs: Temp: 98.9  F (37.2  C) Temp src: Oral BP: 98/56 Pulse: (!) 130   Resp: 16 SpO2: 99 % O2 Device: None (Room air)    Weight: 172 lbs 0 oz          Data   Inflammatory Markers   Recent Labs   Lab Test 11/04/22  0527 10/28/22  1340 10/24/22  1637 10/19/22  1515 10/14/22  1137 10/05/22  0508 10/04/22  0541 10/02/22  0516 10/01/22  0614 09/27/22  0454 09/25/22  1854   SED 80*  --   --   --   --  124*  --   --   --   --  73*   CRP 2.1* 17.09* 35.20* 28.71* 51.40*  --  206.40* 217.99* 222.72*   < > 309.97*    < > = values in this interval not displayed.        Hematology Studies   Recent Labs   Lab Test 11/04/22  0527 10/28/22  1340 10/24/22  1637 10/19/22  1515 10/14/22  1127 10/05/22  0508   WBC 6.0 5.7 3.7* 2.7* 3.0* 10.4   ANEU  --   --   --  0.6*  --   --    AEOS  --   --   --  0.2  --   --    HGB 9.8* 11.0* 10.8* 9.1* 10.4* 9.1*   MCV 86 86 84 86 86 87    398 445 432 339 793*       Metabolic Studies   Recent Labs   Lab Test 11/04/22  0527 10/28/22  1340 10/24/22  1637 10/19/22  1515 10/14/22  1137    136 136 137  137   POTASSIUM 4.6 3.8 4.1 4.2 3.9   CHLORIDE 103 97* 98 100 98   CO2 27 28 26 25 26   BUN 10 9.4 10.6 10.9 11.6   CR 0.83 0.90 0.86 0.80 0.82   GFRESTIMATED >90 >90 >90 >90 >90       Hepatic Studies    Recent Labs   Lab Test 10/28/22  1340 10/24/22  1637 10/19/22  1515 10/14/22  1137 10/05/22  0508 10/04/22  1730   BILITOTAL 0.3 0.3 0.2 0.4 0.4 0.4   ALKPHOS 64 73 80 120 237* 263*   ALBUMIN 3.9 4.0 3.5 3.9 3.2* 3.1*   AST 29 37 72* 71* 91* 146*   ALT 46 64* 102* 119* 241* 284*       Most Recent 6 Bacteria Isolates From Any Culture (See EPIC Reports for Culture Details):No lab results found.    Urine Studies    Recent Labs   Lab Test 10/04/22  1955   LEUKEST Negative       Vancomycin Levels    Recent Labs   Lab Test 09/28/22  2141   VANCOMYCIN <4.0       Hepatitis B Testing   Recent Labs   Lab Test 10/14/22  1137 10/04/22  1730   HBCAB Nonreactive  --    HEPBANG  --  Nonreactive     Hepatitis C Testing     Hepatitis C Antibody   Date Value Ref Range Status   10/04/2022 Nonreactive Nonreactive Final     HIVTesting   Recent Labs   Lab Test 10/14/22  1137   HIAGAB Nonreactive       Respiratory Virus Testing    No results found for: RS, FLUAG  COVID-19 Antibody Results, Testing for Immunity    COVID-19 Antibody Results, Testing for Immunity   No data to display.         COVID-19 PCR Results    COVID-19 PCR Results 9/25/22 11/3/22   SARS CoV2 PCR Negative Negative      Comments are available for some flowsheets but are not being displayed.

## 2022-11-04 NOTE — PLAN OF CARE
Dr. Crews at beside endorsing need to nerve block patient's right knee to minimize pain. Patient's SO Bee made aware and verbally gave permission to proceed with block. Consent signed. Procedure completed without any complications.

## 2022-11-04 NOTE — PLAN OF CARE
Alert and oriented x4. Pain okay from PRN meds given in pacu. Compliant with CPM machine. 2 hours on and 2 hours off. ACE wrap okay. Drain patent. VSS.      Problem: Plan of Care - These are the overarching goals to be used throughout the patient stay.    Goal: Optimal Comfort and Wellbeing  Outcome: Progressing

## 2022-11-04 NOTE — BRIEF OP NOTE
[facility] Brief Operative Note    Pre-operative diagnosis: Arthrofibrosis of knee joint, right [M24.661]  Hemarthrosis of right knee [M25.061]   Post-operative diagnosis Same as preop   Procedure: Procedure(s):  RIGHT KNEE OPEN IRRIGATION AND DEBRIDEMENT WITH SYNOVECTOMY  AND MANIPULATION UNDER ANESTHESIA   Surgeon(s): Surgeon(s) and Role:     * Jovani Hahn MD - Primary   Estimated blood loss: 300 mL    Specimens: ID Type Source Tests Collected by Time Destination   A : RIGHT KNEE SYNOVIUM Tissue Knee, Right ANAEROBIC BACTERIAL CULTURE ROUTINE, GRAM STAIN, AEROBIC BACTERIAL CULTURE ROUTINE Jovani Hahn MD 11/3/2022  4:08 PM       Findings: Hemarthrosis, synovial thickening, no signs of obvious infection.

## 2022-11-04 NOTE — UTILIZATION REVIEW
Continued stay Outpatient      Concurrent stay review; Secondary Review Determination         Under the authority of the Utilization Management Committee, the utilization review process indicated a secondary review on the above patient.  The review outcome is based on review of the medical records, discussions with staff, and applying clinical experience noted on the date of the review.          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  Patient has low-grade fever today postoperative day 1.  Chest CT scan negative for PE but again demonstrating persistent soft tissue stranding along the left sternalclavicle joint suggestive of osteomyelitis.  However this was seen on imaging 10/2022. Echocardiogram is ordered.   Continue with outpatient status.  However change to inpatient if infection is found and started on IV antibiotic treatment    Patient is clinically improving and there is no clear indication to change patient's status to inpatient. The severity of illness, intensity of service provided, expected LOS and risk for adverse outcome make the care appropriate for observation.    The information on this document is developed by the utilization review team in order for the business office to ensure compliance.  This only denotes the appropriateness of proper admission status and does not reflect the quality of care rendered.         The definitions of Inpatient Status and Observation Status used in making the determination above are those provided in the CMS Coverage Manual, Chapter 1 and Chapter 6, section 70.4.      Sincerely,   Christian Goyal MD    Utilization Review  Physician Advisor  [facility].

## 2022-11-04 NOTE — PROGRESS NOTES
[facility]  OUTPATIENT PHYSICAL THERAPY EVALUATION  PLAN OF TREATMENT FOR OUTPATIENT REHABILITATION  (COMPLETE FOR INITIAL CLAIMS ONLY)  Patient's Last Name, First Name, M.I.  YOB: 1990  YolandaSanket                        Provider's Name  [facility] Medical Record No.  [id number]                             Onset Date:  11/03/22   Start of Care Date:  11/04/22   Type:     _X_PT   ___OT   ___SLP Medical Diagnosis:  Knee I&D              PT Diagnosis:  impaired functional mobility Visits from SOC:  1     See note for plan of treatment, functional goals and certification details    I CERTIFY THE NEED FOR THESE SERVICES FURNISHED UNDER        THIS PLAN OF TREATMENT AND WHILE UNDER MY CARE     (Physician co-signature of this document indicates review and certification of the therapy plan).                   11/04/22 0915   Appointment Info   Signing Clinician's Name / Credentials (PT) Hui Gan, PT, DPT   Quick Adds   Quick Adds Certification   Living Environment   People in Home significant other   Current Living Arrangements house   Home Accessibility stairs to enter home;stairs within home   Number of Stairs, Main Entrance 3   Stair Railings, Main Entrance railings safe and in good condition   Number of Stairs, Within Home, Primary greater than 10 stairs   Stair Railings, Within Home, Primary railings safe and in good condition   Self-Care   Equipment Currently Used at Home walker, rolling;crutches   Activity/Exercise/Self-Care Comment Pt reports that he has a 4WW and crutches at home   General Information   Onset of Illness/Injury or Date of Surgery 11/03/22   Referring Physician Jovani Hahn MD   Patient/Family Therapy Goals Statement (PT) to go home   Pertinent History of Current Problem (include personal factors and/or comorbidities that impact the POC) Knee I&D   Existing Precautions/Restrictions weight bearing   Weight-Bearing  Status - LLE full weight-bearing   Weight-Bearing Status - RLE weight-bearing as tolerated   Bed Mobility   Bed Mobility supine-sit;sit-supine   Supine-Sit [place] (Bed Mobility) minimum assist (75% patient effort)   Sit-Supine [place] (Bed Mobility) minimum assist (75% patient effort)   Comment, (Bed Mobility) Min assist with supine<>sit transfers. Assist with BLE.   Transfers   Transfers sit-stand transfer   Comment, (Transfers) SBA with sit<>stand transfers with FWW. Verbal cues for safety and safe hand placement.   Sit-Stand Transfer   Sit-Stand [place] (Transfers) supervision;verbal cues   Assistive Device (Sit-Stand Transfers) walker, front-wheeled   Comment, (Sit-Stand Transfer) SBA with FWW for sit<>stand transfers. Verbal cues for safety and safe hand placement.   Gait/Stairs (Locomotion)   [place] Level (Gait) supervision;verbal cues   Assistive Device (Gait) walker, front-wheeled   Distance in Feet (Required for LE Total Joints) 10'   Distance in Feet (Gait) 350'   Pattern (Gait) step-through   Deviations/Abnormal Patterns (Gait) antalgic;thanh decreased;gait speed decreased   Negotiation (Stairs) stairs independence;handrail location;number of steps;ascending technique;descending technique;maintains weight-bearing status   Comment, (Gait/Stairs) Pt ambulates with SBA and FWW. Verbal cues for safety and posture. Pt ambulates with short steps. Pt negotiates 4 steps x 2 with bilateral railings and SBA. Verbal cues for safety and safe step technique/pattern.   Clinical Impression   Criteria for Skilled Therapeutic Intervention Yes, treatment indicated   PT Diagnosis (PT) impaired functional mobility   Influenced by the following impairments pain, weakness   Functional limitations due to impairments transfers, ambulation, stairs   Clinical Presentation (PT Evaluation Complexity) Evolving/Changing   Clinical Presentation Rationale Pt presents as medically diagnosed   Clinical Decision  Making (Complexity) moderate complexity   Planned Therapy Interventions (PT) balance training;bed mobility training;gait training;home exercise program;patient/family education;ROM (range of motion);stair training;strengthening;stretching;transfer training   Anticipated Equipment Needs at Discharge (PT) walker, rolling   Risk & Benefits of therapy have been explained patient;spouse/significant other   PT Total Evaluation Time   PT Eval, Moderate Complexity Minutes (22652) 10   Plan of Care Review   Plan of Care Reviewed With patient;significant other   Therapy Certification   Start of care date 11/04/22   Certification date from 11/04/22   Certification date to 12/03/22   Medical Diagnosis Knee I&D   Physical Therapy Goals   PT Frequency One time eval and treatment only   PT Predicted Duration/Target Date for Goal Attainment 11/04/22   PT Goals Transfers;Gait;Stairs   PT: Transfers Supervision/stand-by assist;Sit to/from stand;Assistive device;Goal Met  (FWW)   PT: Gait Supervision/stand-by assist;Assistive device;Rolling walker;Greater than 200 feet;Goal Met  (FWW)   PT: Stairs Supervision/stand-by assist;4 stairs;Rail on both sides;Goal Met   Interventions   Interventions Quick Adds Gait Training;Therapeutic Activity   Therapeutic Activity   Therapeutic Activities: dynamic activities to improve functional performance Minutes (78294) 15   Treatment Detail/Skilled Intervention Min assist x 1 with supine to sit transfers. Assist with lifting up RLE out of CPM machine. Assist with BLE. SBA with FWW for sit<>stand transfers. Verbal cues for safety and safe hand placement. Min assist x 1 with sit to supine transfers, assist with BLE. Once pt returns to room and sits on/lays down in bed, pt started to shake reporting that he felt cold. RN notified and pt provided with warm water and warm blankets. Checked in with pt about 1/2 hour later and pt reports improvement in shaking.   Gait Training   Gait Training Minutes  (46048) 15   Symptoms Noted During/After Treatment (Gait Training) fatigue   Treatment Detail/Skilled Intervention Pt ambulates with SBA and FWW. Verbal cues for safety and posture. Pt ambulates with decreased gait speed. Pt educated on bending/moving R knee as much as tolerated. Pt reports some fatigue towards end of walk. When asked if he wants to be wheeled back to room with wheelchair, pt reports that he is fine. Pt negotiates 4 steps x 2 with SBA and bilateral railings. Verbal cues for safety and safe step technique/pattern. Pt demonstrates understanding with negotiating stairs safely reporting that he has been performing the stairs this way before.   [place] Level (Gait Training) stand-by assist   Physical Assistance Level (Gait Training) supervision;verbal cues   Weight Bearing (Gait Training) weight-bearing as tolerated   Assistive Device (Gait Training) rolling walker   Pattern Analysis (Gait Training) other (see comments)  (step through)   Gait Analysis Deviations decreased thanh;increased time in double stance;decreased step length;decreased toe-to-floor clearance   Impairments (Gait Analysis/Training) pain;ROM decreased;strength decreased   Stair Railings present at both sides   Physical Assist/Nonphysical Assist (Stairs) supervision;verbal cues   Level of [place] (Stairs) stand-by assist   PT Discharge Planning   PT Plan discharge PT   PT Discharge Recommendation (DC Rec) (S)  home with assist   PT Rationale for DC Rec Pt reports good home support and good home setup. Pt reports that he has crutches and 4WW at home. Pt reports that he has no concerns with mobility at home.   PT Brief overview of current status SBA with ambulation/mobility with FWW   Total Session Time   Timed Code Treatment Minutes 30   Total Session Time (sum of timed and untimed services) 40       Hui Gan, PT, DPT

## 2022-11-04 NOTE — PLAN OF CARE
Physical Therapy Discharge Summary    Reason for therapy discharge:    Discharged to home.  All goals and outcomes met, no further needs identified.    Progress towards therapy goal(s). See goals on Care Plan in [facility] electronic health record for goal details.  Goals met    Therapy recommendation(s):    Continue home exercise program.

## 2022-11-04 NOTE — CONSULTS
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 [facility] service was asked to evaluate patient for postoperative medical management as follows below. Please resume the home medications as reconciled and further noted below with ordered hold parameters.  Vital signs have been stable post-operatively including hemodynamically stable blood pressure and heart rate. Thank you for this consult; we will continue to follow this patient until discharge.    Right knee hematoma/immobility  Status post procedure below  Drain in place  Hold anticoagulation    History of septic knee  Reviewed ID note  No antibiotics except for prophylactic perioperative  Monitor closely for signs of infection or systemic infection    Presumed autoimmune disorder with elevated CRP and sed rate  Plan for outpatient follow-up with rheumatology  We will trend CRP and sed rate    Existing preoperative acute blood loss anemia  Increases risk for perioperative anemia requiring transfusion  Transfuse less than 7 or symptoms  Watch drain output  Hold Eliquis    History of DVT  Provoked post procedure  Relatively asymptomatic  No history of pulmonary embolism  Should be okay to hold Eliquis in the short-term  Discussed directly with surgery and patient  If no significant bleeding overnight would consider low-dose heparin infusion  Patient understands risks and benefits and agrees with this plan  Plan was made in direct discussion with him      Procedure(s):  RIGHT KNEE OPEN IRRIGATION AND DEBRIDEMENT WITH SYNOVECTOMY  AND  MANIPULATION UNDER ANESTHESIA  Post-operative Day: Day of Surgery  Code status:Full Code         Estimated Blood Loss:  300 mL    Hospital Problem List   No problem-specific Assessment & Plan notes found for this encounter.    Active Problems:    * No active hospital problems. *      -Reviewed the patient's preoperative H and P and updated missing elements.  -Home medication reconciliation has been reviewed.  Medications have been ordered as noted from the home list and changes are documented above     HISTORY     Sanket Guerrero is a 32 year old old male with a history of septic arthritis requiring operative intervention now taken back to the operating room for second surgery due to a frozen knee.  We been asked to comment on anticoagulation.  Patient does have a known distal DVT.  Was anticoagulated with Eliquis prior to surgery which was held for 2 and half days prior to surgery.  Currently drain is in place and he is having significant output.  Discussed directly with patient and surgeon.  We will stay off of anticoagulation tonight and reassess in the morning.  Risks and benefits discussed.  Patient agrees.  He probably has an underlying autoimmune disorder is going to follow-up with rheumatology as an outpatient.  His inflammatory markers are quite high.    Past Medical History     Patient Active Problem List    Diagnosis Date Noted     Left-sided chest wall pain 09/26/2022     Priority: Medium     Effusion of right knee joint 09/26/2022     Priority: Medium     Febrile illness 09/26/2022     Priority: Medium     Septic arthritis of knee, right (H) 09/26/2022     Priority: Medium        Surgical History   He  has a past surgical history that includes Arthroscopy knee incision and drainage (Bilateral, 9/26/2022); Arthroscopy knee irrigation and debridement (Right, 9/28/2022); Irrigation and debridement neck, combined (Left, 9/28/2022); Arthroscopy knee irrigation and debridement (Right, 10/2/2022); and Irrigation  and debridement shoulder, combined (Left, 10/2/2022).     Past Surgical History:   Procedure Laterality Date     ARTHROSCOPY KNEE INCISION AND DRAINAGE Bilateral 9/26/2022    Procedure: Right knee arthroscopic irrigation and debridement, partial synovectomy and left sternoclavicular joint needle aspiration;  Surgeon: Jovani Hahn MD;  Location: [facility] OR     ARTHROSCOPY KNEE IRRIGATION AND DEBRIDEMENT Right 9/28/2022    Procedure: Right IRRIGATION AND DEBRIDEMENT, KNEE, ARTHROSCOPIC;  Surgeon: Jovani Hahn MD;  Location: [facility] OR     ARTHROSCOPY KNEE IRRIGATION AND DEBRIDEMENT Right 10/2/2022    Procedure: IRRIGATION AND DEBRIDEMENT, KNEE, ARTHROSCOPIC RIGHT;  Surgeon: Zeke Vazquez MD;  Location: [facility] OR     IRRIGATION AND DEBRIDEMENT NECK, COMBINED Left 9/28/2022    Procedure: Open IRRIGATION AND DEBRIDEMENT of Sternal Clavicular Joint Left;  Surgeon: Jovani Hahn MD;  Location: [facility] OR     IRRIGATION AND DEBRIDEMENT SHOULDER, COMBINED Left 10/2/2022    Procedure: Left Sternoclavicular Joint Irrigation And Debridement;  Surgeon: Zeke Vazquez MD;  Location: [facility] OR       Family History    Reviewed, and no known history of autoimmune disorders    Social History    Reviewed, and he  reports that he has quit smoking. His smoking use included cigarettes. He has never used smokeless tobacco. He reports that he does not currently use alcohol. He reports that he does not use drugs.  Social History     Tobacco Use     Smoking status: Former     Types: Cigarettes     Smokeless tobacco: Never     Tobacco comments:     Minimal smoking   Substance Use Topics     Alcohol use: Not Currently       Allergies     Allergies   Allergen Reactions     Ceftriaxone Other (See Comments)     Neutropenia and possibly also anemia     Amoxicillin Rash     Unsure if amoxicillin was the cause, started multiple other medications around the day he started this medication. Also endorsed some intermittent throat  tightening and difficulty swallowing.        Prior to Admission Medications      Medications Prior to Admission   Medication Sig Dispense Refill Last Dose     acetaminophen (TYLENOL) 325 MG tablet Take 2 tablets (650 mg) by mouth every 6 hours as needed for mild pain, other or fever (and adjunct with moderate or severe pain or per patient request)   Past Week     Apixaban Starter Pack (ELIQUIS DVT/PE STARTER PACK) 5 MG TBPK Take 10 mg by mouth 2 times daily for 7 days, THEN 5 mg 2 times daily for 90 days. 70 each 3 10/31/2022     cetirizine (ZYRTEC) 10 MG tablet Take 10 mg by mouth daily as needed for allergies   More than a month     loratadine (CLARITIN) 10 MG tablet Take 10 mg by mouth daily as needed for allergies   More than a month     oxyCODONE (ROXICODONE) 5 MG tablet Take 1 tablet (5 mg) by mouth every 4 hours as needed for moderate to severe pain 10 tablet 0 Past Week     traMADol (ULTRAM) 50 MG tablet Take 50 mg by mouth every 6 hours as needed for severe pain   Past Week       Review of Systems   A 12 point comprehensive review of systems was negative except as noted above.    OBJECTIVE         Physical Exam   Temp:  [97.1  F (36.2  C)-98.5  F (36.9  C)] 98.5  F (36.9  C)  Pulse:  [] 102  Resp:  [7-16] 16  BP: (103-155)/() 145/84  SpO2:  [97 %-100 %] 97 %  Body mass index is 23.33 kg/m .  GENERAL:  Alert, appears comfortable, in no acute distress, appears stated age   HEAD:  Normocephalic, without obvious abnormality, atraumatic   EYES:  PERRL, conjunctiva/corneas clear, no scleral icterus, EOM's intact   NOSE: Nares normal, septum midline, mucosa normal, no drainage   THROAT: Lips, mucosa, and tongue normal; teeth and gums normal, mouth moist   NECK: Supple, symmetrical, trachea midline   BACK:   Symmetric, no curvature, ROM normal   LUNGS:   Clear to auscultation bilaterally, no rales, rhonchi, or wheezing, symmetric chest rise on inhalation, respirations unlabored   CHEST WALL:  No  tenderness or deformity   HEART:  Regular rate and rhythm, S1 and S2 normal, no murmur, rub, or gallop    ABDOMEN:   Soft, non-tender, bowel sounds active all four quadrants, no masses, no organomegaly, no rebound or guarding   SKIN: Dry to touch, no exanthems in the visualized areas   NEURO: Alert, oriented x 4, moves all four extremities freely/spontaneously   PSYCH: Cooperative, behavior is appropriate          Cardiographics Reviewed Personally By Myself     Imaging Reviewed Personally By Myself    Radiology Results: No results found for this or any previous visit (from the past 24 hour(s)).    Labs Reviewed Personally By Myself     Results for orders placed or performed during the hospital encounter of 11/03/22 (from the past 24 hour(s))   Asymptomatic COVID-19 Virus (Coronavirus) by PCR Nasopharyngeal    Specimen: Nasopharyngeal; Swab   Result Value Ref Range    SARS CoV2 PCR Negative Negative    Narrative    Testing was performed using the Xpert Xpress SARS-CoV-2 Assay on the   Cepheid Gene-Xpert Instrument Systems. Additional information about   this Emergency Use Authorization (EUA) assay can be found via the Lab   Guide. This test should be ordered for the detection of SARS-CoV-2 in   individuals who meet SARS-CoV-2 clinical and/or epidemiological   criteria. Test performance is unknown in asymptomatic patients. This   test is for in vitro diagnostic use under the FDA EUA for   laboratories certified under CLIA to perform high complexity testing.   This test has not been FDA cleared or approved. A negative result   does not rule out the presence of PCR inhibitors in the specimen or   target RNA in concentration below the limit of detection for the   assay. The possibility of a false negative should be considered if   the patient's recent exposure or clinical presentation suggests   COVID-19. This test was validated by the [facility]. This laboratory is certified under  the Clinical Laboratory Improvement Amendments of 1988 (CLIA-88) as qualified to perform high complexity laboratory testing.         Preoperative Labs Reviewed Personally By Myself   White count 5.7 hemoglobin 11 platelets 398 sodium 136 potassium 3.8 chloride 96 8 bicarb 28 BUN 9.4 creatinine 0.9 glucose 102  Thank you for this consultation.  Appreciate the opportunity to participate in the care of Sanket Guerrero, please feel free to contact us for any questions or concerns.    Sheng Shafer MD  [facility]  Phone: #[phone]

## 2022-11-04 NOTE — PROGRESS NOTES
Orthopedics Progress Note    S: Patient seen this morning. NAEO. Doing well this morning; tolerating the cpm machine to 65 degrees.. Pain controlled, tolerating PO intake, no nausea or vomiting. No chest pain or shortness of breath.     O: /68 (BP Location: Left arm, Cuff Size: Adult Regular)   Pulse 67   Temp 97.7  F (36.5  C) (Oral)   Resp 18   Ht 1.829 m (6')   Wt 78 kg (172 lb)   SpO2 98%   BMI 23.33 kg/m      No acute distress  Non-labored respirations    LE: dressing c,d,i, +df/pf/ehl/fhl, sensation intact in deep peroneal, superficial peroneal, tibial, sural, saphenous nerve distributions, 2+ DP pulse.  Drain in place with serosanguineous outpute      A/P:   Sanket LIZANDRO Ly is a 32 year old male with a PMH of right knee and left SC joint septic arthritis status post washouts in early September 2022 who is now s/p right knee irrigation debridement with manipulation on 11/3/2020 to    Today we will continue to work on continuous passive motion.  Work with PT.  Continue pain control with oral narcotics and Tylenol, and oxazine.  Continue Ancef x24 hours.  He will discharge later this afternoon.  We will have nursing remove the drain approximately 1 hour prior to discharge.  I would see him back in 1 week for reevaluation.  We will hold on resuming Eliquis until evening of 11/5/2022.        Jovani Hahn MD

## 2022-11-04 NOTE — PLAN OF CARE
Problem: Pain Acute  Goal: Optimal Pain Control and Function  Outcome: Progressing  Intervention: Prevent or Manage Pain  Recent Flowsheet Documentation  Taken 11/4/2022 0730 by Shalini Sue RN  Medication Review/Management: medications reviewed  0922 Patient ambulating with Physical Therapy and reports while walking in hallway he felt shaky. Patient assisted to bed and patient continues to shake reporting he feels cold and he feels he pushed himself to far while walking in the hallway. Patient is tachycardic, patient reports pain is 5 out of 10 related to muscle stiffness from shaking/shivering; provided PRNs Oxycodone 5 mg and Hydroxyzine 25 mg, covered patient with warm blankets, and patient sipped on warm water.       1045 VS: 100.4 temp axillary, resp 16 per min, pulse 137, bp 108/61, SPO2 on room air 99%, Pain 4/10. At this time paged Hospitalitis and [facility] to update about patient status.     Hospitalitis evaluated patient and placed orders to evaluate.    1153 Benadryl 25 mg IV STAT ordered due to rash developed over arms, chest, and knees that patient reports is itchy. New IV access placed for procedures in left antecubital.   1300 skin appears to have hives resolving.     Patient went to CT for PE run. Patient returned to unit, ate lunch, and IV 0.9% sodium chloride 1000 mL bolus started. 1300 CT resulted; sent page to Dr. Shafer to notify.     Patient has Echo ordered.     Patient has been cleared by PT and OT as ready for discharge.

## 2022-11-05 LAB
ANION GAP SERPL CALCULATED.3IONS-SCNC: 7 MMOL/L (ref 5–18)
BASOPHILS # BLD AUTO: 0 10E3/UL (ref 0–0.2)
BASOPHILS NFR BLD AUTO: 0 %
BUN SERPL-MCNC: 11 MG/DL (ref 8–22)
C REACTIVE PROTEIN LHE: 7.4 MG/DL (ref 0–?)
CALCIUM SERPL-MCNC: 8.1 MG/DL (ref 8.5–10.5)
CHLORIDE BLD-SCNC: 107 MMOL/L (ref 98–107)
CO2 SERPL-SCNC: 25 MMOL/L (ref 22–31)
CREAT SERPL-MCNC: 0.76 MG/DL (ref 0.7–1.3)
EOSINOPHIL # BLD AUTO: 0.2 10E3/UL (ref 0–0.7)
EOSINOPHIL NFR BLD AUTO: 1 %
ERYTHROCYTE [DISTWIDTH] IN BLOOD BY AUTOMATED COUNT: 14.9 % (ref 10–15)
ERYTHROCYTE [SEDIMENTATION RATE] IN BLOOD BY WESTERGREN METHOD: 62 MM/HR (ref 0–15)
GFR SERPL CREATININE-BSD FRML MDRD: >90 ML/MIN/1.73M2
GLUCOSE BLD-MCNC: 103 MG/DL (ref 70–125)
HCT VFR BLD AUTO: 25.4 % (ref 40–53)
HGB BLD-MCNC: 7.9 G/DL (ref 13.3–17.7)
IMM GRANULOCYTES # BLD: 0.4 10E3/UL
IMM GRANULOCYTES NFR BLD: 3 %
LYMPHOCYTES # BLD AUTO: 3.1 10E3/UL (ref 0.8–5.3)
LYMPHOCYTES NFR BLD AUTO: 20 %
MCH RBC QN AUTO: 26.7 PG (ref 26.5–33)
MCHC RBC AUTO-ENTMCNC: 31.1 G/DL (ref 31.5–36.5)
MCV RBC AUTO: 86 FL (ref 78–100)
MONOCYTES # BLD AUTO: 0.5 10E3/UL (ref 0–1.3)
MONOCYTES NFR BLD AUTO: 3 %
NEUTROPHILS # BLD AUTO: 11.2 10E3/UL (ref 1.6–8.3)
NEUTROPHILS NFR BLD AUTO: 73 %
NRBC # BLD AUTO: 0 10E3/UL
NRBC BLD AUTO-RTO: 0 /100
PLATELET # BLD AUTO: 147 10E3/UL (ref 150–450)
POTASSIUM BLD-SCNC: 3.9 MMOL/L (ref 3.5–5)
RBC # BLD AUTO: 2.96 10E6/UL (ref 4.4–5.9)
SODIUM SERPL-SCNC: 139 MMOL/L (ref 136–145)
UFH PPP CHRO-ACNC: 0.47 IU/ML
WBC # BLD AUTO: 15.4 10E3/UL (ref 4–11)

## 2022-11-05 PROCEDURE — 85652 RBC SED RATE AUTOMATED: CPT | Performed by: FAMILY MEDICINE

## 2022-11-05 PROCEDURE — 85390 FIBRINOLYSINS SCREEN I&R: CPT | Mod: 26 | Performed by: PATHOLOGY

## 2022-11-05 PROCEDURE — 85730 THROMBOPLASTIN TIME PARTIAL: CPT | Performed by: INTERNAL MEDICINE

## 2022-11-05 PROCEDURE — 86147 CARDIOLIPIN ANTIBODY EA IG: CPT | Performed by: INTERNAL MEDICINE

## 2022-11-05 PROCEDURE — 250N000011 HC RX IP 250 OP 636: Performed by: INTERNAL MEDICINE

## 2022-11-05 PROCEDURE — 85025 COMPLETE CBC W/AUTO DIFF WBC: CPT | Performed by: FAMILY MEDICINE

## 2022-11-05 PROCEDURE — 85613 RUSSELL VIPER VENOM DILUTED: CPT | Performed by: INTERNAL MEDICINE

## 2022-11-05 PROCEDURE — 85520 HEPARIN ASSAY: CPT | Performed by: ORTHOPAEDIC SURGERY

## 2022-11-05 PROCEDURE — 36415 COLL VENOUS BLD VENIPUNCTURE: CPT | Performed by: INTERNAL MEDICINE

## 2022-11-05 PROCEDURE — 250N000013 HC RX MED GY IP 250 OP 250 PS 637: Performed by: INTERNAL MEDICINE

## 2022-11-05 PROCEDURE — 258N000003 HC RX IP 258 OP 636: Performed by: FAMILY MEDICINE

## 2022-11-05 PROCEDURE — 80048 BASIC METABOLIC PNL TOTAL CA: CPT | Performed by: FAMILY MEDICINE

## 2022-11-05 PROCEDURE — 99232 SBSQ HOSP IP/OBS MODERATE 35: CPT | Performed by: INTERNAL MEDICINE

## 2022-11-05 PROCEDURE — 86140 C-REACTIVE PROTEIN: CPT | Performed by: FAMILY MEDICINE

## 2022-11-05 PROCEDURE — 36415 COLL VENOUS BLD VENIPUNCTURE: CPT | Performed by: ORTHOPAEDIC SURGERY

## 2022-11-05 PROCEDURE — 86146 BETA-2 GLYCOPROTEIN ANTIBODY: CPT | Performed by: INTERNAL MEDICINE

## 2022-11-05 PROCEDURE — 250N000013 HC RX MED GY IP 250 OP 250 PS 637: Performed by: ORTHOPAEDIC SURGERY

## 2022-11-05 RX ORDER — HEPARIN SODIUM 10000 [USP'U]/100ML
0-5000 INJECTION, SOLUTION INTRAVENOUS CONTINUOUS
Status: DISPENSED | OUTPATIENT
Start: 2022-11-05 | End: 2022-11-05

## 2022-11-05 RX ADMIN — OXYCODONE HYDROCHLORIDE 5 MG: 5 TABLET ORAL at 12:53

## 2022-11-05 RX ADMIN — HYDROXYZINE HYDROCHLORIDE 25 MG: 25 TABLET, FILM COATED ORAL at 07:50

## 2022-11-05 RX ADMIN — SENNOSIDES AND DOCUSATE SODIUM 1 TABLET: 50; 8.6 TABLET ORAL at 07:49

## 2022-11-05 RX ADMIN — SENNOSIDES AND DOCUSATE SODIUM 1 TABLET: 50; 8.6 TABLET ORAL at 20:02

## 2022-11-05 RX ADMIN — POLYETHYLENE GLYCOL 3350 17 G: 17 POWDER, FOR SOLUTION ORAL at 07:49

## 2022-11-05 RX ADMIN — HYDROXYZINE HYDROCHLORIDE 25 MG: 25 TABLET, FILM COATED ORAL at 15:45

## 2022-11-05 RX ADMIN — ACETAMINOPHEN 975 MG: 325 TABLET, FILM COATED ORAL at 11:49

## 2022-11-05 RX ADMIN — OXYCODONE HYDROCHLORIDE 5 MG: 5 TABLET ORAL at 18:20

## 2022-11-05 RX ADMIN — SODIUM CHLORIDE: 9 INJECTION, SOLUTION INTRAVENOUS at 03:05

## 2022-11-05 RX ADMIN — OXYCODONE HYDROCHLORIDE 5 MG: 5 TABLET ORAL at 07:49

## 2022-11-05 RX ADMIN — ACETAMINOPHEN 975 MG: 325 TABLET, FILM COATED ORAL at 19:56

## 2022-11-05 RX ADMIN — HEPARIN SODIUM 950 UNITS/HR: 10000 INJECTION, SOLUTION INTRAVENOUS at 15:40

## 2022-11-05 RX ADMIN — ACETAMINOPHEN 975 MG: 325 TABLET, FILM COATED ORAL at 03:05

## 2022-11-05 RX ADMIN — APIXABAN 5 MG: 5 TABLET, FILM COATED ORAL at 20:02

## 2022-11-05 ASSESSMENT — ACTIVITIES OF DAILY LIVING (ADL)
ADLS_ACUITY_SCORE: 36
ADLS_ACUITY_SCORE: 36
ADLS_ACUITY_SCORE: 38
ADLS_ACUITY_SCORE: 36

## 2022-11-05 NOTE — PLAN OF CARE
"  Problem: Fever  Goal: Fever: Plan of Care  Outcome: Progressing   Goal Outcome Evaluation:       Pt remains afebrile today. Heart rate in the 80's. Pain is 4-5/10, reports good relief with 5mg Oxycodone. He is mainly taking the oxycodone to better tolerate the CPM . He has used it twice this shift, will try to do two more 2-hour sessions today. He attained 80 degrees last session, having ramped up slowly to that level. Ambulated halls 50 feet, this was painful for him, only in the knee joint (no calf pain with ambulation).He states he is \"just exhausted,\" explained combined effects of pain meds, low hemoglobin, poor sleep, and major surgery. Pt will continue Heparin gtt until his evening dose of Eliquis tonight. IV fluids are discontinued.                  "

## 2022-11-05 NOTE — PLAN OF CARE
Problem: Plan of Care - These are the overarching goals to be used throughout the patient stay.    Goal: Optimal Comfort and Wellbeing  Outcome: Progressing  Problem: VTE (Venous Thromboembolism)  Goal: VTE (Venous Thromboembolism) Symptom Resolution  Outcome: Progressing      Goal Outcome Evaluation:       Patient A/Ox4. Reports of mild pain, managed with scheduled tylenol. CMS intact. Dressing CDI. Drain patent. Heparin drip infusing, recheck tomorrow AM. Pt utilizing CPM machine once. VSS. Plan to discharge home pending further clinical evaluation.

## 2022-11-05 NOTE — PROGRESS NOTES
Orthopedics Progress Note    S: Patient seen this morning.  Yesterday he had a significant bout of tachycardia with associated rigors, and per nursing report a rash that appeared like hives.  He subsequently had a chest CT performed, which ruled out pulmonary embolism.  His echocardiogram was normal without any evidence of vegetations.     Since then, he has significantly improved.  He states that his pain is tolerable.  Doing well this morning; tolerating the cpm machine approximately 70 degrees.     This morning his pain is controlled, tolerating PO intake, no nausea or vomiting. No chest pain or shortness of breath.     O: /56 (BP Location: Right arm, Patient Position: Semi-Arroyo's, Cuff Size: Adult Regular)   Pulse 95   Temp 98.1  F (36.7  C) (Oral)   Resp 17   Ht 1.829 m (6')   Wt 75.6 kg (166 lb 10.7 oz)   SpO2 98%   BMI 22.60 kg/m      General:  No acute distress  Non-labored respirations  Skin: No rashes.     LE: dressing c,d,i.  Knee is warm and swollen, but not erythematous. +df/pf/ehl/fhl, sensation intact in deep peroneal, superficial peroneal, tibial, sural, saphenous nerve distributions, 2+ DP pulse.  Drain in place with serosanguineous output (20 ml overnight).     UE:  Well healed incision over left sternoclavicular joint. Mild pink coloration around scar to be expected. No tenderness to palpation over the sternoclavicular joint or medial clavicle on the left side.      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  CPM to be worn at a minimum of 6 hours/day.  Work with PT.  Pain:  Continue pain control with oral narcotics and Tylenol, and hydoxyzine.    Antibiotics: Completed Ancef x24 hours.  Infectious disease involved and we will appreciate their recommendations.  Anticoagulation: On low heparin drip. Eliquis until evening of 11/5/202  Disposition: Will await internal medicine clearance. Once medically cleared, can discharge to home.   Follow up:  I would see him back in 1 week for reevaluation.        Jovani Hahn MD

## 2022-11-05 NOTE — PROGRESS NOTES
[facility]  Infectious Disease   Progress Note     Date of Admission:  11/3/2022    Assessment & Plan   Low grade fever postopx1, afebrile preop  S/p I and right knee  Findings :    Hemarthrosis, synovial thickening, no signs of obvious infection   DVT on Eliquis , aspiration attempted as outpt, no return  UCHE neg    PLAN  Continue to monitor  Possible discharge   Noted hes planning on seeing Rheum for intermittent recurrent knee swelling mostly right    Candido Colon M.D.    ______________________________________________________________________    Interval History   Feels fine  Doing OK  No dysuria, cough, sob, rash  All 12 systems reviewed, negative except for the above.      Physical Exam   Vital Signs: Temp: 98  F (36.7  C) Temp src: Oral BP: 119/78 Pulse: 85   Resp: 17 SpO2: 100 % O2 Device: None (Room air)    Weight: 166 lbs 10.68 oz  Gen. appearance nontoxic  Eyes no conjunctivitis or icterus  Neck no stiffness or neck vein distention, no LN  Heart  No edema  Lungs breathing comfortably  Abdomen soft not tender  Extremities wrapped  Skin  no rash or emboli  Neurologic alert oriented no focal deficits    Data   Results for orders placed or performed during the hospital encounter of 11/03/22 (from the past 24 hour(s))   Lactic acid whole blood   Result Value Ref Range    Lactic Acid 3.1 (H) 0.7 - 2.0 mmol/L   CT Chest Pulmonary Embolism w Contrast    Narrative    EXAM: CT CHEST PULMONARY EMBOLISM W CONTRAST  LOCATION: [facility]  DATE/TIME: 11/4/2022 12:28 PM    INDICATION: Tachycardia.  COMPARISON: 10/01/2022.  TECHNIQUE: CT chest pulmonary angiogram during arterial phase injection of IV contrast. Multiplanar reformats and MIP reconstructions were performed. Dose reduction techniques were used.   CONTRAST: Isovue  370 90mL.    FINDINGS:  ANGIOGRAM CHEST: No pulmonary embolism. Nonaneurysmal aorta without dissection.    LUNGS AND PLEURA: Lungs are clear.  No pleural effusion or pneumothorax.    MEDIASTINUM/AXILLAE: Few upper normal sized nodes. Normal heart size. No pericardial effusion.    CORONARY ARTERY CALCIFICATION: Compromised by motion and contrast.    UPPER ABDOMEN: Nothing acute.    MUSCULOSKELETAL: Mild stranding along the left sternoclavicular joint again noted. Interval cortical irregularity along the anterior and medial left clavicle in this area.      Impression    IMPRESSION:    1.  Since 10/01/2022, persistent soft tissue stranding along the left sternoclavicular joint with interval bony cortical irregularity and lucency of the medial left clavicle; assuming no intervention since 10/2022, findings suggest osteomyelitis.   Infection along the sternoclavicular joint also possible. Recommend MRI.    2.  No pulmonary embolism.    3.  No other findings to explain symptoms.    NOTE: ABNORMAL REPORT    THE DICTATION ABOVE DESCRIBES AN ABNORMALITY FOR WHICH FOLLOW-UP IS NEEDED.    Echocardiogram Complete   Result Value Ref Range    LVEF  60-65%     Narrative    [id number]  [id number]  [id number]  [id number]^MANUEL^MOHAN^[place] [address]     Name: SEDRICK TONY  MRN: [id number]  : 1990  Study Date: 2022 02:14 PM  Age: 32 yrs  Gender: Male  Patient Location: [facility]  Reason For Study: Tachycardia  Ordering Physician: MOHAN JACKSON  Performed By: MITCHEL     BSA: 2.0 m2  Height: 72 in  Weight: 172 lb  HR: 130  ______________________________________________________________________________  Procedure  Complete Echo Adult. Definity (NDC #48400-046) given intravenously.  ______________________________________________________________________________  Interpretation Summary     1. The left ventricle is normal in size. Left ventricular function is  normal.The ejection fraction is 60-65%.  2. Normal right ventricle size and systolic function.  3. No hemodynamically significant valvular abnormalities on 2D or color  flow  imaging.  ______________________________________________________________________________  Left Ventricle  The left ventricle is normal in size. Left ventricular function is normal.The  ejection fraction is 60-65%. There is normal left ventricular wall thickness.  Left ventricular diastolic function is normal. No regional wall motion  abnormalities noted.     Right Ventricle  Normal right ventricle size and systolic function.     Atria  Normal left atrial size. Right atrial size is normal. There is no color  Doppler evidence of an atrial shunt.     Mitral Valve  Mitral valve leaflets appear normal. There is no evidence of mitral stenosis  or clinically significant mitral regurgitation.     Tricuspid Valve  Tricuspid valve leaflets appear normal. There is no evidence of tricuspid  stenosis or clinically significant tricuspid regurgitation. Right ventricle  systolic pressure estimate normal.     Aortic Valve  The aortic valve is trileaflet. Aortic valve leaflets appear normal. There is  no evidence of aortic stenosis or clinically significant aortic regurgitation.     Pulmonic Valve  The pulmonic valve is not well seen, but is grossly normal. This degree of  valvular regurgitation is within normal limits. There is trace pulmonic  valvular regurgitation.     Vessels  The aorta root is normal. Normal size ascending aorta. IVC diameter <2.1 cm  collapsing >50% with sniff suggests a normal RA pressure of 3 mmHg.     Pericardium  There is no pericardial effusion.     ______________________________________________________________________________  MMode/2D Measurements & Calculations  IVSd: 0.81 cm  LVIDd: 3.9 cm  LVIDs: 3.1 cm  LVPWd: 1.2 cm     FS: 20.6 %  LV mass(C)d: 117.8 grams  LV mass(C)dI: 58.9 grams/m2  Ao root diam: 3.0 cm  LA dimension: 2.0 cm  asc Aorta Diam: 3.1 cm  LA/Ao: 0.67  LVOT diam: 2.1 cm  LVOT area: 3.4 cm2  LA Volume (BP): 33.3 ml  LA Volume Index (BP): 16.7 ml/m2     LA Volume Indexed (AL/bp):  18.8 ml/m2  RWT: 0.60     Doppler Measurements & Calculations  MV E max peter: 108.0 cm/sec  MV dec slope: 930.9 cm/sec2  MV dec time: 0.12 sec  Ao V2 max: 124.9 cm/sec  Ao max P.0 mmHg  Ao V2 mean: 77.2 cm/sec  Ao mean P.8 mmHg  Ao V2 VTI: 14.7 cm  KASI(I,D): 2.9 cm2  KASI(V,D): 2.0 cm2  LV V1 max P.2 mmHg  LV V1 max: 74.7 cm/sec  LV V1 VTI: 12.7 cm  SV(LVOT): 43.1 ml  SI(LVOT): 21.6 ml/m2  PA V2 max: 77.1 cm/sec  PA max P.4 mmHg  PA mean P.4 mmHg  PA V2 VTI: 12.2 cm  PA acc time: 0.11 sec  AV Peter Ratio (DI): 0.60  KASI Index (cm2/m2): 1.5     E/E': 5.9  E/E' av.3  Lateral E/e': 6.7  Medial E/e': 5.9  Peak E' Peter: 18.3 cm/sec     ______________________________________________________________________________  Report approved by: Kwaku Cano 2022 03:39 PM         Blood Culture Peripheral Blood    Specimen: Peripheral Blood   Result Value Ref Range    Culture No growth after 12 hours    Blood Culture Peripheral Blood    Specimen: Peripheral Blood   Result Value Ref Range    Culture No growth after 12 hours    CBC with platelets   Result Value Ref Range    WBC Count 16.4 (H) 4.0 - 11.0 10e3/uL    RBC Count 3.96 (L) 4.40 - 5.90 10e6/uL    Hemoglobin 10.5 (L) 13.3 - 17.7 g/dL    Hematocrit 33.4 (L) 40.0 - 53.0 %    MCV 84 78 - 100 fL    MCH 26.5 26.5 - 33.0 pg    MCHC 31.4 (L) 31.5 - 36.5 g/dL    RDW 14.6 10.0 - 15.0 %    Platelet Count 175 150 - 450 10e3/uL   Extra Tube    Narrative    The following orders were created for panel order Extra Tube.  Procedure                               Abnormality         Status                     ---------                               -----------         ------                     Extra Green Top (Lithium...[[id number]]                      Final result                 Please view results for these tests on the individual orders.   Extra Green Top (Lithium Heparin) Tube   Result Value Ref Range    Hold Specimen JIC    Lactic acid whole blood    Result Value Ref Range    Lactic Acid 2.1 (H) 0.7 - 2.0 mmol/L   Heparin Unfractionated Anti Xa Level   Result Value Ref Range    Anti Xa Unfractionated Heparin 0.33 For Reference Range, See Comment IU/mL    Narrative    Therapeutic Range: UFH: 0.25-0.50 IU/mL for low intensity dosing,  0.30-0.70 IU/mL for high intensity dosing DVT and PE.  This test is not validated for other direct factor X inhibitors (e.g. rivaroxaban, apixaban, edoxaban, betrixaban, fondaparinux) and should not be used for monitoring of other medications.   CBC with Platelets & Differential    Narrative    The following orders were created for panel order CBC with Platelets & Differential.  Procedure                               Abnormality         Status                     ---------                               -----------         ------                     CBC with platelets and d...[[id number]]  Abnormal            Final result                 Please view results for these tests on the individual orders.   Heparin Unfractionated Anti Xa Level   Result Value Ref Range    Anti Xa Unfractionated Heparin 0.47 For Reference Range, See Comment IU/mL    Narrative    Therapeutic Range: UFH: 0.25-0.50 IU/mL for low intensity dosing,  0.30-0.70 IU/mL for high intensity dosing DVT and PE.  This test is not validated for other direct factor X inhibitors (e.g. rivaroxaban, apixaban, edoxaban, betrixaban, fondaparinux) and should not be used for monitoring of other medications.   CRP inflammation   Result Value Ref Range    CRP 7.4 (H) 0.0 - <0.8 mg/dL   Erythrocyte sedimentation rate auto   Result Value Ref Range    Erythrocyte Sedimentation Rate 62 (H) 0 - 15 mm/hr   Basic metabolic panel   Result Value Ref Range    Sodium 139 136 - 145 mmol/L    Potassium 3.9 3.5 - 5.0 mmol/L    Chloride 107 98 - 107 mmol/L    Carbon Dioxide (CO2) 25 22 - 31 mmol/L    Anion Gap 7 5 - 18 mmol/L    Urea Nitrogen 11 8 - 22 mg/dL    Creatinine 0.76 0.70 - 1.30 mg/dL     Calcium 8.1 (L) 8.5 - 10.5 mg/dL    Glucose 103 70 - 125 mg/dL    GFR Estimate >90 >60 mL/min/1.73m2   CBC with platelets and differential   Result Value Ref Range    WBC Count 15.4 (H) 4.0 - 11.0 10e3/uL    RBC Count 2.96 (L) 4.40 - 5.90 10e6/uL    Hemoglobin 7.9 (L) 13.3 - 17.7 g/dL    Hematocrit 25.4 (L) 40.0 - 53.0 %    MCV 86 78 - 100 fL    MCH 26.7 26.5 - 33.0 pg    MCHC 31.1 (L) 31.5 - 36.5 g/dL    RDW 14.9 10.0 - 15.0 %    Platelet Count 147 (L) 150 - 450 10e3/uL    % Neutrophils 73 %    % Lymphocytes 20 %    % Monocytes 3 %    % Eosinophils 1 %    % Basophils 0 %    % Immature Granulocytes 3 %    NRBCs per 100 WBC 0 <1 /100    Absolute Neutrophils 11.2 (H) 1.6 - 8.3 10e3/uL    Absolute Lymphocytes 3.1 0.8 - 5.3 10e3/uL    Absolute Monocytes 0.5 0.0 - 1.3 10e3/uL    Absolute Eosinophils 0.2 0.0 - 0.7 10e3/uL    Absolute Basophils 0.0 0.0 - 0.2 10e3/uL    Absolute Immature Granulocytes 0.4 <=0.4 10e3/uL    Absolute NRBCs 0.0 10e3/uL

## 2022-11-05 NOTE — PROGRESS NOTES
[facility] MEDICINE PROGRESS NOTE      Identification/Summary: Sanket Guerrero is a 32 year old male with a past medical history of complex history with septic joints requiring 6 weeks of IV antibiotics and multiple interventions.  Presented for an I&D of a frozen knee.  Doing well postprocedure but developed tachycardia and rigors without fever.  He has a history of DVT and is now on low-dose heparin infusion so that I could be shut off if he develops bleeding.  Elevated lactate but no infectious indication for antibiotics per infectious disease. He will likely need outpatient Rheumatology follow up to work up any rheumatological disorders.      Assessment and Plan:  Right knee hematoma/immobility  Status post procedure below  Drain in place     History of septic knee  Reviewed ID note from preop  No antibiotics except for prophylactic perioperative  Developed tachycardia chills and low-grade fever 11/4  ID consulted  Discussed case with them directly and we will hold off on antibiotics currently. Nothing to indicate an actual infection.   If it felt that likely the rash and hives may be autoimmune in nature.      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 hours of 11/5     History of DVT  Provoked post procedure  Relatively asymptomatic  No history of pulmonary embolism  Developed tachycardia concerning for pulmonary embolism 11/4  CT PE run without PE  Discussed directly with surgeon  We will pursue a low-dose non-bolused heparin overnight tonight  Can likely transition to oral Eliquis if no significant bleeding  tomorrow as above     Tachycardia/elevated lactic acid/elevated white count  Consulted ID  Fluid bolus  Increase fluid rate  Trend lactic -repeat p.m. 10/4  Low threshold to start aggressive antibiotics and transition to high level of care     Abnormal CT chest  Findings seen at the sternoclavicular joint and in the clavicle itself  Discussed directly with ID  Likely from previous infection that was treated  Continue to monitor  No antibiotics for now per ID                    # Drug Induced Coagulation Defect: home medication list includes an anticoagulant medication- Apixaban               Diet: Advance Diet as Tolerated: Regular Diet Adult  Discharge Instruction - Regular Diet Adult  DVT Prophylaxis:  Hep gtt  Code Status: Full Code     Anticipated possible discharge in when hemodynamically stable    Skyler Martin MD  [facility]  Phone: #[phone]    Interval History/Subjective:    NAEON    Physical Exam/Objective:  Temp:  [98  F (36.7  C)-98.7  F (37.1  C)] 98.4  F (36.9  C)  Pulse:  [] 105  Resp:  [16-22] 22  BP: (107-123)/(56-78) 123/73  SpO2:  [97 %-100 %] 97 %  Body mass index is 22.6 kg/m .    GENERAL:  Alert, appears comfortable   HEAD:  Normocephalic, without obvious abnormality, atraumatic   EYES:  PERRL, EOM's intact   NOSE: Nares normal, septum midline   THROAT: Lips, mucosa   NECK: Supple, symmetrical, trachea midline   BACK:   Symmetric, no curvature, ROM normal   LUNGS:   Clear to auscultation bilaterally, no rales, rhonchi, or wheezing, symmetric chest rise on inhalation, respirations unlabored   CHEST WALL:  No tenderness or deformity   HEART:  Regular rate and rhythm, S1 and S2 normal, no murmur, rub, or gallop    ABDOMEN:   Soft, non-tender, bowel sounds active all four quadrants, no masses, no organomegaly, no rebound or guarding   EXTREMITIES: Extremities normal, atraumatic, Right knee swollen    SKIN: Dry to touch   NEURO:  Alert, oriented x3, moves all four extremities freely   PSYCH: Cooperative     Data reviewed today: I personally reviewed all new medications, labs, imaging/diagnostics reports over the past 24 hours. Pertinent findings include:    Imaging:   No results found for this or any previous visit (from the past 24 hour(s)).    Labs:  Most Recent 3 CBC's:Recent Labs   Lab Test 11/05/22  0429 11/04/22  1529 11/04/22 0527   WBC 15.4* 16.4* 6.0   HGB 7.9* 10.5* 9.8*   MCV 86 84 86   * 175 285     Most Recent 3 BMP's:  Recent Labs   Lab Test 11/05/22  0429 11/04/22  0527 10/28/22  1340    140 136   POTASSIUM 3.9 4.6 3.8   CHLORIDE 107 103 97*   CO2 25 27 28   BUN 11 10 9.4   CR 0.76 0.83 0.90   ANIONGAP 7 10 11   JOCELYNE 8.1* 9.6 9.7    133* 102*     Most Recent 2 LFT's:  Recent Labs   Lab Test 10/28/22  1340 10/24/22  1637   AST 29 37   ALT 46 64*   ALKPHOS 64 73   BILITOTAL 0.3 0.3     Most Recent 3 INR's:  Recent Labs   Lab Test 11/04/22  0527 10/04/22  1730   INR 1.08 1.14       Medications:   Personally Reviewed.  Medications     heparin 950 Units/hr (11/05/22 1540)       acetaminophen  975 mg Oral Q8H     apixaban ANTICOAGULANT  5 mg Oral BID     polyethylene glycol  17 g Oral Daily     senna-docusate  1 tablet Oral BID     sodium chloride (PF)  3 mL Intracatheter Q8H

## 2022-11-06 VITALS
TEMPERATURE: 98 F | WEIGHT: 166.67 LBS | OXYGEN SATURATION: 99 % | RESPIRATION RATE: 17 BRPM | BODY MASS INDEX: 22.57 KG/M2 | HEIGHT: 72 IN | SYSTOLIC BLOOD PRESSURE: 125 MMHG | HEART RATE: 86 BPM | DIASTOLIC BLOOD PRESSURE: 75 MMHG

## 2022-11-06 LAB
CCP AB SER IA-ACNC: 0.7 U/ML
ERYTHROCYTE [DISTWIDTH] IN BLOOD BY AUTOMATED COUNT: 14.8 % (ref 10–15)
HCT VFR BLD AUTO: 28.2 % (ref 40–53)
HGB BLD-MCNC: 8.7 G/DL (ref 13.3–17.7)
HOLD SPECIMEN: NORMAL
MCH RBC QN AUTO: 26.1 PG (ref 26.5–33)
MCHC RBC AUTO-ENTMCNC: 30.9 G/DL (ref 31.5–36.5)
MCV RBC AUTO: 85 FL (ref 78–100)
PLATELET # BLD AUTO: 166 10E3/UL (ref 150–450)
RBC # BLD AUTO: 3.33 10E6/UL (ref 4.4–5.9)
UFH PPP CHRO-ACNC: 0.96 IU/ML
WBC # BLD AUTO: 3.9 10E3/UL (ref 4–11)

## 2022-11-06 PROCEDURE — 250N000013 HC RX MED GY IP 250 OP 250 PS 637: Performed by: ORTHOPAEDIC SURGERY

## 2022-11-06 PROCEDURE — 250N000013 HC RX MED GY IP 250 OP 250 PS 637: Performed by: INTERNAL MEDICINE

## 2022-11-06 PROCEDURE — 99207 PR NO CHARGE LOS: CPT | Performed by: INTERNAL MEDICINE

## 2022-11-06 PROCEDURE — 85520 HEPARIN ASSAY: CPT | Performed by: ORTHOPAEDIC SURGERY

## 2022-11-06 PROCEDURE — 36415 COLL VENOUS BLD VENIPUNCTURE: CPT | Performed by: ORTHOPAEDIC SURGERY

## 2022-11-06 PROCEDURE — 85027 COMPLETE CBC AUTOMATED: CPT | Performed by: INTERNAL MEDICINE

## 2022-11-06 RX ADMIN — OXYCODONE HYDROCHLORIDE 5 MG: 5 TABLET ORAL at 07:01

## 2022-11-06 RX ADMIN — SENNOSIDES AND DOCUSATE SODIUM 1 TABLET: 50; 8.6 TABLET ORAL at 08:02

## 2022-11-06 RX ADMIN — ACETAMINOPHEN 975 MG: 325 TABLET, FILM COATED ORAL at 11:22

## 2022-11-06 RX ADMIN — ACETAMINOPHEN 975 MG: 325 TABLET, FILM COATED ORAL at 02:44

## 2022-11-06 RX ADMIN — POLYETHYLENE GLYCOL 3350 17 G: 17 POWDER, FOR SOLUTION ORAL at 08:01

## 2022-11-06 RX ADMIN — APIXABAN 5 MG: 5 TABLET, FILM COATED ORAL at 08:02

## 2022-11-06 RX ADMIN — OXYCODONE HYDROCHLORIDE 5 MG: 5 TABLET ORAL at 11:30

## 2022-11-06 ASSESSMENT — COLUMBIA-SUICIDE SEVERITY RATING SCALE - C-SSRS
2. HAVE YOU ACTUALLY HAD ANY THOUGHTS OF KILLING YOURSELF IN THE PAST MONTH?: NO
3. HAVE YOU BEEN THINKING ABOUT HOW YOU MIGHT KILL YOURSELF?: NO
4. HAVE YOU HAD THESE THOUGHTS AND HAD SOME INTENTION OF ACTING ON THEM?: NO
1. IN THE PAST MONTH, HAVE YOU WISHED YOU WERE DEAD OR WISHED YOU COULD GO TO SLEEP AND NOT WAKE UP?: NO
6. HAVE YOU EVER DONE ANYTHING, STARTED TO DO ANYTHING, OR PREPARED TO DO ANYTHING TO END YOUR LIFE?: NO
5. HAVE YOU STARTED TO WORK OUT OR WORKED OUT THE DETAILS OF HOW TO KILL YOURSELF? DO YOU INTEND TO CARRY OUT THIS PLAN?: NO

## 2022-11-06 ASSESSMENT — ACTIVITIES OF DAILY LIVING (ADL)
ADLS_ACUITY_SCORE: 34
ADLS_ACUITY_SCORE: 38
ADLS_ACUITY_SCORE: 34
ADLS_ACUITY_SCORE: 38
ADLS_ACUITY_SCORE: 34

## 2022-11-06 NOTE — PROGRESS NOTES
[facility] Infectious Disease  afebrile  Off abx  cx neg    Candido Colon M.D.

## 2022-11-06 NOTE — PROGRESS NOTES
Orthopedics Progress Note    S: Patient seen this morning.  He is doing well. Pain has continued to improve. He tolerated just over 80 degrees in his CPM yesterday, with premedicating with oxycodone before the CPM sessions.   Has been ambulating with PT. Tolerating PO intake, no nausea or vomiting. No chest pain or shortness of breath.     O: /65 (BP Location: Right arm)   Pulse 87   Temp 98  F (36.7  C) (Oral)   Resp 17   Ht 1.829 m (6')   Wt 75.6 kg (166 lb 10.7 oz)   SpO2 98%   BMI 22.60 kg/m      General:  No acute distress. Well appearing this morning.   Non-labored respirations  Skin: No rashes.     LE: dressing c,d,i.  Knee is warm and swollen, but not erythematous. +df/pf/ehl/fhl, sensation intact in deep peroneal, superficial peroneal, tibial, sural, saphenous nerve distributions, 2+ DP pulse.      UE:  Well healed incision over left sternoclavicular joint.     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  CPM to be worn at a minimum of 6 hours/day.  Work with PT.  Pain: Continue pain control with oral narcotics and Tylenol, and hydoxyzine.    Antibiotics: Completed Ancef x24 hours.  Infectious disease involved and we will appreciate their recommendations. They recommend no continued abx at this point.  Anticoagulation:  Eliquis begun evening of 11/5/202  Disposition: Discharge to home today.  Follow up:  I recommended he follow up with  this week to recheck labs, etc.   I would like to see him back in 1 week for reevaluation.        Jovani Hahn MD

## 2022-11-06 NOTE — PLAN OF CARE
Problem: Pain Acute  Goal: Optimal Pain Control and Function  Outcome: Progressing  Intervention: Develop Pain Management Plan  Recent Flowsheet Documentation  Taken 11/5/2022 1956 by Shey Baez RN  Pain Management Interventions:   medication (see MAR)   breathing exercises   care clustered  Intervention: Prevent or Manage Pain  Recent Flowsheet Documentation  Taken 11/5/2022 1956 by Shey Baez RN  Medication Review/Management: medications reviewed    Patient vital signs are at baseline: Yes, HR in the low 100s, afebrile.  Patient able to ambulate as they were prior to admission or with assist devices provided by therapies during their stay:  Yes  Patient MUST void prior to discharge:  Yes  Patient able to tolerate oral intake:  Yes  Pain has adequate pain control using Oral analgesics:  Yes  Does patient have an identified :  Yes  Has goal D/C date and time been discussed with patient:  Yes    CMS intact. Ace wrap CDI. Anticipated discharge home pending clinical progression.

## 2022-11-06 NOTE — PROGRESS NOTES
Care Management Discharge Note    Discharge Date: 11/06/2022    Discharge Disposition:  Home    Discharge Services:  None    Discharge DME:  CPM device    Discharge Transportation: family or friend will provide    Private pay costs discussed: Not applicable    PAS Confirmation Code:  N/A    Education Provided on the Discharge Plan: No       Persons Notified of Discharge Plans: Patient    Patient/Family in Agreement with the Plan:  Yes    Handoff Referral Completed: No    Additional Information:  SWCM reviewed chart. No CM needs identified. Pt will discharge home with family to transport.    MARIAMA Loco

## 2022-11-08 LAB
DRVVT CONFIRM NORMALIZED RATIO: 1.11
DRVVT SCREEN MIX RATIO: 1.21
DRVVT SCREEN RATIO: 1.19
HEPZYMED PTT 1:2 MIX: 45 SECONDS (ref 31–45)
HEPZYMED PTT RATIO: 1.24
HEPZYMED PTT-LA: 47 SECONDS (ref 31–45)
HEPZYMED THROMBIN TIME: 21.3 SECONDS (ref 15.7–21.7)
INR PPP: 1.14 (ref 0.85–1.15)
LA PPP-IMP: ABNORMAL
LUPUS INTERPRETATION: ABNORMAL
PATIENT PTT-LA: 114 SECONDS (ref 31–45)
PLATELET NEUTRALIZATION: -8 SECONDS
THROMBIN TIME: >60 SECONDS (ref 13–19)

## 2022-11-09 LAB
B2 GLYCOPROT1 IGG SERPL IA-ACNC: 1.9 U/ML
B2 GLYCOPROT1 IGM SERPL IA-ACNC: <2.4 U/ML
BACTERIA BLD CULT: NO GROWTH
BACTERIA BLD CULT: NO GROWTH
BACTERIA TISS BX CULT: NO GROWTH
CARDIOLIPIN IGG SER IA-ACNC: 2.4 GPL-U/ML
CARDIOLIPIN IGG SER IA-ACNC: NEGATIVE
CARDIOLIPIN IGM SER IA-ACNC: <2 MPL-U/ML
CARDIOLIPIN IGM SER IA-ACNC: NEGATIVE

## 2022-11-11 LAB
BACTERIA SNV CULT: NORMAL
BACTERIA TISS BX CULT: NORMAL

## 2022-11-22 ENCOUNTER — OFFICE VISIT (OUTPATIENT)
Dept: RHEUMATOLOGY | Facility: CLINIC | Age: 32
End: 2022-11-22
Payer: COMMERCIAL

## 2022-11-22 VITALS
DIASTOLIC BLOOD PRESSURE: 70 MMHG | WEIGHT: 166 LBS | BODY MASS INDEX: 22.48 KG/M2 | SYSTOLIC BLOOD PRESSURE: 128 MMHG | HEIGHT: 72 IN

## 2022-11-22 DIAGNOSIS — M00.261 STREPTOCOCCAL ARTHRITIS OF RIGHT KNEE (H): Primary | ICD-10-CM

## 2022-11-22 DIAGNOSIS — R79.89 ELEVATED LFTS: ICD-10-CM

## 2022-11-22 DIAGNOSIS — R70.0 ELEVATED SED RATE: ICD-10-CM

## 2022-11-22 DIAGNOSIS — R79.82 ELEVATED C-REACTIVE PROTEIN (CRP): ICD-10-CM

## 2022-11-22 DIAGNOSIS — Z87.39 HISTORY OF JOINT EFFUSION: ICD-10-CM

## 2022-11-22 LAB
CRP SERPL-MCNC: <3 MG/L
ERYTHROCYTE [SEDIMENTATION RATE] IN BLOOD BY WESTERGREN METHOD: 36 MM/HR (ref 0–15)
FERRITIN SERPL-MCNC: 225 NG/ML (ref 31–409)

## 2022-11-22 PROCEDURE — 99204 OFFICE O/P NEW MOD 45 MIN: CPT | Performed by: PHYSICIAN ASSISTANT

## 2022-11-22 PROCEDURE — 82728 ASSAY OF FERRITIN: CPT | Performed by: PHYSICIAN ASSISTANT

## 2022-11-22 PROCEDURE — 36415 COLL VENOUS BLD VENIPUNCTURE: CPT | Performed by: PHYSICIAN ASSISTANT

## 2022-11-22 PROCEDURE — 86140 C-REACTIVE PROTEIN: CPT | Performed by: PHYSICIAN ASSISTANT

## 2022-11-22 PROCEDURE — 85652 RBC SED RATE AUTOMATED: CPT | Performed by: PHYSICIAN ASSISTANT

## 2022-11-22 NOTE — PATIENT INSTRUCTIONS
After Visit Instructions:     Thank you for coming to [facility] for your care. It is my goal to partner with you to help you reach your optimal state of health.       Plan:     Schedule follow-up with Brittney Barrett PA-C as needed.   Labs: CRP and Sed Rate  Other:  If trending down, would monitor and continue plan per orthopedics. If trending up, I will call your orthopedic provider. I will let you know what the plan is when I see your results.       Brittney Barrett PA-C  [facility]  [facility]    Contact information: [facility]  Clinic Number:  [phone]  Please call or send a [employer] message with any questions about your care

## 2022-11-22 NOTE — PROGRESS NOTES
Rheumatology Clinic Visit  [facility]  KAT Goodman Ly MRN# [id number]   YOB: 1990 Age: 32 year old   Date of Visit: 11/22/2022  Primary care provider: No Ref-Primary, Physician          Assessment and Plan:     1.  Streptococcal arthritis of right knee  2.  History of joint effusion  3.  Elevated sed rate  4.  Elevated CRP    Patient presents today for an initial evaluation of streptococcal arthritis of his right knee.  He is accompanied today by his fiancée Bee.  This happened suddenly in September of this year.  It has affected his right knee.  He has since had 4 surgeries.  The last 2 surgeries per patient did not show any evidence of infection.  He feels that things have been improving since the last surgery 3 weeks ago.  He has not had any return of fevers in 1 month.  He is not currently on any antibiotics.  Physical examination does show swelling and warmth around the right knee.  He has a well-healing scar.  No other synovitis, dactylitis, tenosynovitis or enthesitis was noted on examination.  He has full joint range of motion otherwise.  There is no mucositis or skin rashes noted.  I was shown a picture of a skin rash that the patient had earlier this year.  It was very pruritic.  It is red and raised and crossed his back.  You can see evidence of excoriation.  This seems to have resolved with the use of Zyrtec and has not returned.  Previous laboratory evaluations and imaging studies reviewed.  Results below.    Discussed with the patient and his fiancée that I do not currently see any evidence of an autoimmune disorder.  He has had an extensive serology work-up which has come back normal with the exception of his elevated CRP and sed rate.  He has been fully evaluated for gout which has come back negative.  His uric acid's have been normal and the crystal analysis has been negative on multiple occasions.  He has also had a full work-up looking for other potential  "causes including having a CT CAP and CT head.  These results were also negative.  Lyme DNA PCR and Lyme antibody serum was normal as well.    I did review this case with Dr. Cabrera Elise.  The typical causes of an autoimmune monoarticular arthritis have been ruled out including gout.  At this time will order a CRP and a sed rate.  If these are trending down, would recommend that we continue to monitor his symptoms.  If these are trending up and the patient has increasing warmth, redness or swelling of the knee joint would recommend that he be evaluated again with orthopedics.  I will call the patient with the results of the inflammatory markers once they are complete.  He sees orthopedics, Dr.Nels Hahn, On December 5 and should keep that appointment.  Per patient he has been cleared from needing follow-up with infectious disease. I will also contact Dr. Hahn with the results of today's visit.    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      He reports that he ate some food in late September 2022. He got sick suddenly. He had a fever and felt cold. At one point the fever was 107 per his girlfriend. He woke up with collar and shoulder pain the morning after the fever. 2 days after that his right knee started to swell. On Sunday his pain was \"unbearable\" so he went to Urgent Care. At urgent care, as they couldn't do anything, he was sent to ER. They aspirated his knee. He reports that it was a pus fluid. The knee swelling improved, but a couple hours later it returned. He was then admitted. Tests showed that it was group B strep. He has 3 initial surgeries. The first one was for the knee, but then he pulled some testing from his collarbone, but this was positive. The second surgery they went back into the knee and in the collar bone. The 3rd surgery was due to the knee being swollen. No infection was " found in this surgery. He denies any prior sore throat prior to this.     3 weeks ago he had a knee washout to clean out blood and scar tissue. Between the 3rd and this surgery he was unable to bend his knee. The 4th surgery was to make it so he can bend the knee. No infection was found in this surgery either. His knee was very swollen from coagulated blood. Prior to the surgery he was not having much for pain, unless he tried to bend his knee. He is able to move his knee now.     He is not on any antibiotics. He has seen infectious disease. The question is to why this happened. No recent fevers. Last was approximately 1 month ago.     Prior to this, he has had a 10 year history of his knees swelling. This would last 2-5 days. This would happen once or twice a season. He has not had any flares that he can recall in 2020, 2021, or 2022 until September. This was never evaluated as he states that it would swell but it was never pain. He has not had any other joint symptoms. He has some post-surgical pain. He does feel that his knee started to get better this week.      No known family history of autoimmune diseases, such as RA, lupus or psoriasis. Grandfather with arthritis. No personal history of psoriasis, ulcerative colitis or crohn's. No history of gout and no crystals were found in the fluids.     He has had skin rashes at times. It was more frequent with adhesives. His arm started to itch with the adhesive from the PICC line. After the PICC line was removed, this resolved. In the beginning of this year he got a body rash. He describes them as hives. It was across his back and chest. It was very pruritic. He used Zyrtec. His last outbreak was around August. He did not see his PCP or allergy for it. No mouth sores. No dry eyes or dry mouth. No hair loss. No numbness or tingling. No Raynaud's.     Teardrop cells.          Review of Systems:     Constitutional: negative  Skin: negative  Eyes:  negative  Ears/Nose/Throat: negative  Respiratory: No shortness of breath, dyspnea on exertion, cough, or hemoptysis  Cardiovascular: negative  Gastrointestinal: negative  Genitourinary: negative  Musculoskeletal: as above  Neurologic: negative  Psychiatric: negative  Hematologic/Lymphatic/Immunologic: negative  Endocrine: negative         Active Problem List:     Patient Active Problem List    Diagnosis Date Noted     Left-sided chest wall pain 09/26/2022     Priority: Medium     Effusion of right knee joint 09/26/2022     Priority: Medium     Febrile illness 09/26/2022     Priority: Medium     Septic arthritis of knee, right (H) 09/26/2022     Priority: Medium            Past Medical History:     Past Medical History:   Diagnosis Date     Chronic infection      DVT (deep venous thrombosis) (H)      Past Surgical History:   Procedure Laterality Date     ARTHROSCOPY KNEE INCISION AND DRAINAGE Bilateral 9/26/2022    Procedure: Right knee arthroscopic irrigation and debridement, partial synovectomy and left sternoclavicular joint needle aspiration;  Surgeon: Jovani Hahn MD;  Location: [facility] OR     ARTHROSCOPY KNEE IRRIGATION AND DEBRIDEMENT Right 9/28/2022    Procedure: Right IRRIGATION AND DEBRIDEMENT, KNEE, ARTHROSCOPIC;  Surgeon: Jovani Hahn MD;  Location: [facility] OR     ARTHROSCOPY KNEE IRRIGATION AND DEBRIDEMENT Right 10/2/2022    Procedure: IRRIGATION AND DEBRIDEMENT, KNEE, ARTHROSCOPIC RIGHT;  Surgeon: Zeke Vazquez MD;  Location: [facility] OR     EXAM UNDER ANESTHESIA, MANIPULATE JOINT (LOCATION) Right 11/3/2022    Procedure: AND MANIPULATION UNDER ANESTHESIA;  Surgeon: Jovani Hahn MD;  Location: [facility] Main OR     IRRIGATION AND DEBRIDEMENT KNEE, COMBINED Right 11/3/2022    Procedure: RIGHT KNEE OPEN IRRIGATION AND DEBRIDEMENT WITH SYNOVECTOMY;  Surgeon: Jovani Hahn MD;  [facility] Main OR     IRRIGATION AND DEBRIDEMENT NECK, COMBINED Left 9/28/2022    Procedure: Open  IRRIGATION AND DEBRIDEMENT of Sternal Clavicular Joint Left;  Surgeon: Jovani Hahn MD;  Location: [facility] OR     IRRIGATION AND DEBRIDEMENT SHOULDER, COMBINED Left 10/2/2022    Procedure: Left Sternoclavicular Joint Irrigation And Debridement;  Surgeon: Zeke Vazquez MD;  Location: [facility] OR            Social History:     Social History     Socioeconomic History     Marital status: Single     Spouse name: Not on file     Number of children: Not on file     Years of education: Not on file     Highest education level: Not on file   Occupational History     Not on file   Tobacco Use     Smoking status: Former     Types: Cigarettes     Smokeless tobacco: Never     Tobacco comments:     Minimal smoking   Substance and Sexual Activity     Alcohol use: Not Currently     Drug use: Never     Sexual activity: Not on file   Other Topics Concern     Not on file   Social History Narrative     Not on file     Social Determinants of Health     Financial Resource Strain: Not on file   Food Insecurity: Not on file   Transportation Needs: Not on file   Physical Activity: Not on file   Stress: Not on file   Social Connections: Not on file   Intimate Partner Violence: Not on file   Housing Stability: Not on file          Family History:   No family history on file.         Allergies:     Allergies   Allergen Reactions     Ceftriaxone Other (See Comments)     Neutropenia and possibly also anemia     Amoxicillin Rash     Unsure if amoxicillin was the cause, started multiple other medications around the day he started this medication. Also endorsed some intermittent throat tightening and difficulty swallowing.             Medications:     Current Outpatient Medications   Medication Sig Dispense Refill     acetaminophen (TYLENOL) 500 MG tablet Take 2 tablets (1,000 mg) by mouth every 6 hours       apixaban ANTICOAGULANT (ELIQUIS) 5 MG tablet Take 1 tablet (5 mg) by mouth 2 times daily 60 tablet 1     Apixaban Starter Pack  (ELIQUIS DVT/PE STARTER PACK) 5 MG TBPK Take 10 mg by mouth 2 times daily for 7 days, THEN 5 mg 2 times daily for 90 days. 70 each 3     cetirizine (ZYRTEC) 10 MG tablet Take 10 mg by mouth daily as needed for allergies       hydrOXYzine (VISTARIL) 25 MG capsule Take 1 capsule (25 mg) by mouth 3 times daily as needed for anxiety (pain) 30 capsule 0     loratadine (CLARITIN) 10 MG tablet Take 10 mg by mouth daily as needed for allergies       oxyCODONE (ROXICODONE) 5 MG tablet Take 1 tablet (5 mg) by mouth every 4 hours as needed for moderate to severe pain 1 tab for pain 4-7/10, 2 tabs for pain 8-10/10 30 tablet 0            Physical Exam:   There were no vitals taken for this visit.  Wt Readings from Last 6 Encounters:   11/04/22 75.6 kg (166 lb 10.7 oz)   10/26/22 78 kg (172 lb)   10/24/22 78.5 kg (173 lb)   10/14/22 79.6 kg (175 lb 8 oz)   09/26/22 86.9 kg (191 lb 9.3 oz)     Constitutional: well-developed, appearing stated age; cooperative  Eyes: nl PERRLA, conjunctiva, sclera  ENT: nl external ears, nose, hearing, lips, teeth, gums, throat. No mucositis.   No mucous membrane lesions, normal saliva pool  Neck: no mass or thyroid enlargement  Resp: lungs clear to auscultation  CV: RRR, no murmurs, rubs or gallops, no edema  Lymph: no cervical, supraclavicular or epitrochlear nodes  MS: Patient's right knee has a well-healing scar.  He does have swelling around the knee joint.  There is warmth associated with it as well.  There is a slight erythematous hue around the knee as well.  No other joint swelling was noted.  There was no dactylitis, tenosynovitis or enthesopathy.  Skin: no nail pitting, alopecia, rash, nodules or lesions.   Neuro: nl cranial nerves.   Psych: nl judgement, orientation, memory, affect.           Data:   Imaging:  X-ray right knee 9/25/2022  IMPRESSION: Normal joint spaces and alignment. No fracture. Moderate joint effusion.    CT chest abdomen pelvis 10/1/2022  IMPRESSION:  1.  Soft tissue  swelling left sternoclavicular joint with some soft tissue air present suspicious for infection. No CT evidence for osteomyelitis.    CT head 10/1/2022  IMPRESSION:  1.  No evidence for acute intracranial process.    Ultrasound lower extremity 10/14/2022  Impression:     1. Acute occlusive DVT of one of the paired posterior tibial veins  from the proximal calf to ankle.     2. The other posterior tibial vein with acute DVT in the mid calf.    Laboratory:  9/25/2022  Influenza negative  Blood culture did not show any growth  CBC with a white blood cell count of 12.5, hemoglobin 12.5, platelet count 218  .97  Uric acid 3.9  Sed rate 73  Lyme disease antibodies 0.06  Albumin 3.8, AST 27, ALT 38  Aerobic synovial fluid bacterial culture showed Streptococcus agalactiae (group B strep) cell count of his fluid showed yellow color, turbid clarity, 353,380 nucleated cells, with a 24% neutrophils, 19% lymphocyte, 57% monocyte  Crystal analysis was negative  Lyme DNA PCR was not detected    9/26/2022 lactic acid was 1.0  Blood culture did not show any growth  CBC showed a white blood cell count of 11.9, hemoglobin of 11.8, platelet count of 226  Synovial fluid analysis did not have any anaerobic organisms aerobic bacteria grew group B streptococcus and crystal analysis was negative  Chlamydia/Neisseria gonorrhea was negative    9/27/2022  CBC showed a white blood cell count of 8.2, hemoglobin 10.4, platelet count 280  CRP was 269.53  MRSA was negative  Blood culture negative    9/28/2022 blood culture was negative  CBC showed a white blood cell count of 8.2, hemoglobin 10.6, platelet count 412  CRP was 216.87  Alkaline phosphatase was 132, AST was 60  ALT was 86  Urine was negative for amphetamine, barbiturate, benzodiazepine, cannabinoids, opiates, PCP, and cocaine    9/29/2022  Creatinine 0.82, GFR greater than 90  Blood culture did not show any growth    9/30/2022  CRP was 213.18  Lactic acid was 0.5  Blood culture  did not show any growth    10/1/2022  .72    10/4/2022 CK was 50 hepatitis serologies were negative  Hepatic function showed an alkaline phosphatase of 263, ,   Iron was 12, iron binding capacity was 184  Ferritin was 1328    10/5/2022 sed rate was 124  White blood cell count was 10.4, hemoglobin 9.1, platelet count 793  Reticulocyte count was normal    10/14/2022  CRP was 51.4  Alkaline phosphatase was 128, AST 71,   Treponema was nonreactive  HIV was nonreactive  Uric acid was 4.3  Rheumatoid factor was less than 6  Ferritin was 1838  Lactate dehydrogenase was 297    10/19/2022 AST 72,   CRP 28.71    10/24/2022   CRP 35.2  AST 37, ALT 64  White blood cell count 3.7, hemoglobin 10.8, platelet count 445, ANC 0.4  Ferritin 503  UCHE negative    10/28/2022  Ferritin 599  Liver function tests are normal  CRP 17.09  Normal ANC and white blood cell count  Crystal ID of synovial fluid was negative aerobic bacteria did not show any growth anaerobic bacteria did not show any growth    11/3/2022 COVID-19 negative  Anaerobic bacterial culture no organisms grown  No growth of aerobic bacterial culture    11/4/2022 White blood cell count 6.0, hemoglobin 9.8, platelet count 285  Creatinine 0.83  Sed rate 80  CRP 2.1  Lactic acid 3.1  Blood cultures were negative  CCP antibody 0.7    11/5/2022  CRP 7.4  Sed rate 62  Normal creatinine  Lupus anticoagulant show that have time treatment may result in false positive or negative lupus anticoagulant testing.  Therefore all samples treated recommended to repeat testing off anticoagulation  Cardiolipin antibodies were negative  Beta-2 glycoprotein antibodies were normal    11/6/2022  White blood cell count 3.9, hemoglobin 8.7, platelet count 166

## 2022-11-23 ENCOUNTER — TELEPHONE (OUTPATIENT)
Dept: RHEUMATOLOGY | Facility: CLINIC | Age: 32
End: 2022-11-23

## 2022-11-23 NOTE — TELEPHONE ENCOUNTER
Spoke with colleague of Dr. Hahn to relay summary of my visit with Mr. Guerrero yesterday. Confirmed that there was no pathology from 11/3/22 surgery.    Brittney Barrett PA-C on 11/23/2022 at 10:37 AM

## 2022-11-23 NOTE — TELEPHONE ENCOUNTER
----- Message from Brittney Barrett PA-C sent at 11/23/2022  8:23 AM CST -----  Please let patient know that his CRP is now normal. His sed rate is still elevated but is trending down and is at 36. Would recommend that he continue to monitor his knee and symptoms. No further workup with me is needed at this time. I will also connect with Dr. Hahn with these results.     Brittney Barrett, PAC

## 2022-12-07 ENCOUNTER — TELEPHONE (OUTPATIENT)
Dept: FAMILY MEDICINE | Facility: CLINIC | Age: 32
End: 2022-12-07

## 2022-12-07 ENCOUNTER — MYC MEDICAL ADVICE (OUTPATIENT)
Dept: FAMILY MEDICINE | Facility: CLINIC | Age: 32
End: 2022-12-07

## 2022-12-09 NOTE — TELEPHONE ENCOUNTER
See TE dated 12/7/22 indicating form was processed.  MyChart reply sent to patient and closing encounter.     Brittany Chance RN  [facility]

## 2022-12-09 NOTE — TELEPHONE ENCOUNTER
Form completed, signed, and faxed to [facility] at [phone]. Copy of form sent to scan and copy placed in cabinet.

## 2023-01-14 NOTE — PROGRESS NOTES
[facility]  OUTPATIENT OCCUPATIONAL THERAPY  EVALUATION  PLAN OF TREATMENT FOR OUTPATIENT REHABILITATION  (COMPLETE FOR INITIAL CLAIMS ONLY)  Patient's Last Name, First Name, M.I.  YOB: 1990  Sanket Guerrero                          Provider's Name  [facility] Medical Record No.  [id number]                             Onset Date:  11/03/22   Start of Care Date:  11/04/22   Type:     ___PT   _X_OT   ___SLP Medical Diagnosis:  (P) s/p TKA                    OT Diagnosis:  pt's ADLs and fxl txrs affected s/p knee I&D Visits from SOC:  1     See note for plan of treatment, functional goals and certification details    I CERTIFY THE NEED FOR THESE SERVICES FURNISHED UNDER        THIS PLAN OF TREATMENT AND WHILE UNDER MY CARE     (Physician co-signature of this document indicates review and certification of the therapy plan).

## 2023-01-20 ENCOUNTER — MYC MEDICAL ADVICE (OUTPATIENT)
Dept: FAMILY MEDICINE | Facility: CLINIC | Age: 33
End: 2023-01-20
Payer: MEDICAID

## 2023-01-23 NOTE — TELEPHONE ENCOUNTER
Idania  Pt was in hospital for knee surgery and placed on eliquis. Sent mychart to see if ok to stop taking thinner when runs out of her pills.     Here is note from discharge    Medication instructions - Anticoagulation - other  Take the Eliquis as prescribed, starting on 11/5/22 evening. This is given to help minimize your risk of blood  clot      Stan Moraes RN

## 2023-01-23 NOTE — TELEPHONE ENCOUNTER
Covering for primary/ordering provider:  It appears that he had a DVT and may need to take for a minimum of 3 months. He needs a follow up appointment with PCP to make this decision.

## 2023-01-23 NOTE — TELEPHONE ENCOUNTER
[facility] Medical Opinion form started and routed to Idania Tenorio for review and signature.    Humira Counseling:  I discussed with the patient the risks of adalimumab including but not limited to myelosuppression, immunosuppression, autoimmune hepatitis, demyelinating diseases, lymphoma, and serious infections.  The patient understands that monitoring is required including a PPD at baseline and must alert us or the primary physician if symptoms of infection or other concerning signs are noted.

## 2023-02-06 ENCOUNTER — TRANSCRIBE ORDERS (OUTPATIENT)
Dept: OTHER | Age: 33
End: 2023-02-06

## 2023-02-06 DIAGNOSIS — Z98.890 H/O SYNOVECTOMY: Primary | ICD-10-CM

## 2023-02-06 DIAGNOSIS — M25.561 RIGHT KNEE PAIN: ICD-10-CM

## 2023-02-15 ENCOUNTER — TELEPHONE (OUTPATIENT)
Dept: FAMILY MEDICINE | Facility: CLINIC | Age: 33
End: 2023-02-15
Payer: COMMERCIAL

## 2023-02-15 NOTE — TELEPHONE ENCOUNTER
Pt was called to clarify request. Pt asking ?need for allergy referral.  States he is questioning if he has an allergy to alcoholic beverages.   Pt states the day after having alcohol his right knee will swell up briefly & he occasionally gets a rash-random places, itchy, red, bumps. Doesn't get worse with scratching.  Pt last had some alcohol on Sunday 2/12 & Monday 2/13 had some right knee swelling & rash to back, chest. This lasted for a few hours & then resolved.    Currently has no rash or swelling. He has put a compression sleeve on his right knee.     Pt states he has been tested in the past for Gout & this has been negative.     Video visit scheduled to discuss with provider.    Iris Wilson RN

## 2023-02-15 NOTE — TELEPHONE ENCOUNTER
General Call      Reason for Call:  Referral for Allergist needed?  Does he need a appt first?    What are your questions or concerns:  See above    Date of last appointment with provider: 12/7/22 telephone appt with Idania    Could we send this information to you in [facility] or would you prefer to receive a phone call?:   No preference   Okay to leave a detailed message?: Yes at Home number on file [phone] (home)    Vanna Reese

## 2023-02-16 ENCOUNTER — VIRTUAL VISIT (OUTPATIENT)
Dept: FAMILY MEDICINE | Facility: CLINIC | Age: 33
End: 2023-02-16
Payer: COMMERCIAL

## 2023-02-16 DIAGNOSIS — L50.9 URTICARIA: ICD-10-CM

## 2023-02-16 DIAGNOSIS — Z86.718 PERSONAL HISTORY OF DVT (DEEP VEIN THROMBOSIS): Primary | ICD-10-CM

## 2023-02-16 DIAGNOSIS — Z87.39 HISTORY OF SEPTIC ARTHRITIS: ICD-10-CM

## 2023-02-16 PROCEDURE — 99214 OFFICE O/P EST MOD 30 MIN: CPT | Mod: VID | Performed by: NURSE PRACTITIONER

## 2023-02-16 NOTE — PATIENT INSTRUCTIONS
Recommend to repeat right leg US, contact [facility]  at [phone], to schedule this appointment.    Call [facility] to schedule with allergist at     Schedule lab appointment through my chart

## 2023-02-16 NOTE — PROGRESS NOTES
Sanket is a 32 year old who is being evaluated via a billable video visit.      How would you like to obtain your AVS? MyChart  If the video visit is dropped, the invitation should be resent by: Text to cell phone: [phone]  Will anyone else be joining your video visit? No        Assessment & Plan     Personal history of DVT (deep vein thrombosis)  -denies pain and edema in the right lower extremity  -this was provoked DVT due to septic arthritis, patient completed 3 months anticoagulation   - recommended to repeat US Lower Extremity Venous Duplex Right; Future to check for resolution     Urticaria  -reports he gets hives after drinking alcohol, he drinks beer occasionally, would like to discuss with allergist if there are any tests available to check if he has allergy to alcohol and what type    - Adult Allergy/Asthma Referral; Future    History of septic arthritis  -last week had mild edema in his right knee, states resolved now  -recommended to repeat labs and follow up with ortho if symptoms reoccur   - CBC with platelets and differential; Future  - CRP, inflammation; Future  - ESR: Erythrocyte sedimentation rate; Future  - Uric acid; Future      [facility]    Jerri Coles is a 32 year old patient, presenting for the following health issues:  Allergy Consult    Pt asking  for allergy referral.  States he is questioning if he has an allergy to alcoholic beverages.   Pt states the day after having alcohol his right knee will swell up briefly & he occasionally gets a rash-random places, itchy, red, bumps. Doesn't get worse with scratching.  Pt last had some alcohol on Sunday 2/12 & Monday 2/13 had some right knee swelling & rash to back, chest. This lasted for a few hours & then resolved.     Currently has no rash or swelling. He has put a compression sleeve on his right knee.      Pt states he has been tested in the past for Gout & this has been negative.    History of Present Illness       Reason for visit:  Allergic testing and if i need to continue any blood thinners  Symptom onset:  3-7 days ago  Symptoms include:  Knee swelling and itch  Symptom intensity:  Mild  Symptom progression:  Improving  Had these symptoms before:  Yes  Has tried/received treatment for these symptoms:  No    He eats 2-3 servings of fruits and vegetables daily.He consumes 2 sweetened beverage(s) daily.He exercises with enough effort to increase his heart rate 20 to 29 minutes per day.  He exercises with enough effort to increase his heart rate 4 days per week.   He is taking medications regularly.           Review of Systems   Constitutional, HEENT, cardiovascular, pulmonary, gi and gu systems are negative, except as otherwise noted.      Objective           Vitals:  No vitals were obtained today due to virtual visit.    Physical Exam   GENERAL: Healthy, alert and no distress  EYES: Eyes grossly normal to inspection.  No discharge or erythema, or obvious scleral/conjunctival abnormalities.  RESP: No audible wheeze, cough, or visible cyanosis.  No visible retractions or increased work of breathing.    SKIN: Visible skin clear. No significant rash, abnormal pigmentation or lesions.  NEURO: Cranial nerves grossly intact.  Mentation and speech appropriate for age.  PSYCH: Mentation appears normal, affect normal/bright, judgement and insight intact, normal speech and appearance well-groomed.                Video-Visit Details    Type of service:  Video Visit   Video Start Time: 4.35 PM  Video End Time: 4.53 PM    Originating Location (pt. Location): Home  Distant Location (provider location):  On-site  Platform used for Video Visit: [employer]

## 2023-02-22 ENCOUNTER — HOSPITAL ENCOUNTER (OUTPATIENT)
Dept: ULTRASOUND IMAGING | Facility: CLINIC | Age: 33
Discharge: HOME | End: 2023-02-22
Attending: NURSE PRACTITIONER | Admitting: NURSE PRACTITIONER
Payer: COMMERCIAL

## 2023-02-22 DIAGNOSIS — Z86.718 PERSONAL HISTORY OF DVT (DEEP VEIN THROMBOSIS): ICD-10-CM

## 2023-02-22 PROCEDURE — 93971 EXTREMITY STUDY: CPT | Mod: RT

## 2023-03-04 NOTE — PROGRESS NOTES
[facility]  OUTPATIENT OCCUPATIONAL THERAPY  EVALUATION  PLAN OF TREATMENT FOR OUTPATIENT REHABILITATION  (COMPLETE FOR INITIAL CLAIMS ONLY)  Patient's Last Name, First Name, M.I.  YOB: 1990  Sanket Guerrero                          Provider's Name  [facility] Medical Record No.  [id number]                             Onset Date:  11/03/22   Start of Care Date:  11/04/22   Type:     ___PT   _X_OT   ___SLP Medical Diagnosis:  s/p TKA                    OT Diagnosis:  pt's ADLs and fxl txrs affected s/p knee I&D Visits from SOC:  1     See note for plan of treatment, functional goals and certification details    I CERTIFY THE NEED FOR THESE SERVICES FURNISHED UNDER        THIS PLAN OF TREATMENT AND WHILE UNDER MY CARE     (Physician co-signature of this document indicates review and certification of the therapy plan).

## 2023-06-30 ENCOUNTER — OFFICE VISIT (OUTPATIENT)
Dept: ALLERGY | Facility: CLINIC | Age: 33
End: 2023-06-30
Attending: NURSE PRACTITIONER
Payer: COMMERCIAL

## 2023-06-30 VITALS
RESPIRATION RATE: 16 BRPM | TEMPERATURE: 97.8 F | WEIGHT: 193 LBS | HEIGHT: 72 IN | HEART RATE: 77 BPM | OXYGEN SATURATION: 98 % | DIASTOLIC BLOOD PRESSURE: 78 MMHG | SYSTOLIC BLOOD PRESSURE: 123 MMHG | BODY MASS INDEX: 26.14 KG/M2

## 2023-06-30 DIAGNOSIS — L50.9 URTICARIA: ICD-10-CM

## 2023-06-30 PROCEDURE — 99243 OFF/OP CNSLTJ NEW/EST LOW 30: CPT | Performed by: ALLERGY & IMMUNOLOGY

## 2023-06-30 ASSESSMENT — ENCOUNTER SYMPTOMS
RHINORRHEA: 0
SHORTNESS OF BREATH: 0
EYE PAIN: 0
SINUS PRESSURE: 0
FEVER: 0
WHEEZING: 0
EYE DISCHARGE: 0
EYE REDNESS: 0
EYE ITCHING: 0
SINUS PAIN: 0
COUGH: 0
SORE THROAT: 0
CHILLS: 0

## 2023-06-30 NOTE — PATIENT INSTRUCTIONS
Prescription Assistance  If you need assistance with your prescriptions (cost, coverage, etc) please contact: [place] [facility] [phone]        If labs have been ordered/completed, please allow 7-14 business days for final interpretation of results to be sent on My Chart, phone or mail. Some lab results can take up to 28 days for results.       Allergy Staff Appt Hours Shot Hours Locations    Physician     Wilton Galvan MD       Support Staff     [facility]    Tuesday:   [place] :  [place] :         :  [place] 7-3     [place]        Tuesday: :: : :10        Tuesday: :10        Thursday[facility]3:10    [facility]       Tues & Wed: :10       Thurs: 12:10       Fri:            [facility]  [address] [place] [address]  Appt Line: [phone]      [facility]  [address] [place] [address]  Appt Line: [phone]    Pulmonary Function Scheduling:  [place]: [phone]  [place]: [phone]  [phone]

## 2023-06-30 NOTE — NURSING NOTE
Chief Complaint   Patient presents with     Allergy Consult     Urticaria (L50.9)       There were no vitals filed for this visit.  Wt Readings from Last 1 Encounters:   11/22/22 75.3 kg (166 lb)     Kaur Padilla MA     normal

## 2023-06-30 NOTE — PROGRESS NOTES
"SUBJECTIVE:                                                                   Sanket Guerrero is a 32 year old male who presents today to our Allergy Clinic at [facility]; He is being seen in consultation at the request of Idania Tenorio CNP, for an evaluation of urticaria.  When he started drinking alcohol at 20 years old, he developed a \"bulge \"above his knees. Most of the time, it was on the right side, but sometimes, it was on the left side or bilateral. It would be erythematous and itchy. It would appear hours after alcohol ingestion. Not uncommon for him, the bulge would develop the next day. It would resolve by the end of the day.  He had surgery on his right knee for a different reason, and since then, the \"bulge \" with alcohol resolved. Since 2020, he has been breaking out in pruritic hives on his chest and extremities 4 to 14 hours after alcohol ingestion. Quite often, it would happen the next day, more than 10 hours after alcohol ingestion. Not associated with vomiting, wheezing, visible angioedema, or difficulty breathing or swallowing.   By the next day, hives would resolve, sometimes sooner. He usually takes diphenhydramine, and he thinks that it is somewhat helpful. Does not happen all the time, maybe 50% of the time. It does not happen particularly with a particular alcoholic beverage. It can happen with any. He denies taking NSAIDs or exercising prior to those days.        Patient Active Problem List   Diagnosis     Left-sided chest wall pain     Effusion of right knee joint     Febrile illness     Septic arthritis of knee, right (H)       Past Medical History:   Diagnosis Date     Chronic infection      DVT (deep venous thrombosis) (H)       No data available        Past Surgical History:   Procedure Laterality Date     ARTHROSCOPY KNEE INCISION AND DRAINAGE Bilateral 9/26/2022    Procedure: Right knee arthroscopic irrigation and debridement, partial synovectomy and " left sternoclavicular joint needle aspiration;  Surgeon: Jovani Hahn MD;  Location: [facility]     ARTHROSCOPY KNEE IRRIGATION AND DEBRIDEMENT Right 9/28/2022    Procedure: Right IRRIGATION AND DEBRIDEMENT, KNEE, ARTHROSCOPIC;  Surgeon: Jovani Hahn MD;  Location: [facility]     ARTHROSCOPY KNEE IRRIGATION AND DEBRIDEMENT Right 10/2/2022    Procedure: IRRIGATION AND DEBRIDEMENT, KNEE, ARTHROSCOPIC RIGHT;  Surgeon: Zeke Vazquez MD;  Location: [facility]     EXAM UNDER ANESTHESIA, MANIPULATE JOINT (LOCATION) Right 11/3/2022    Procedure: AND MANIPULATION UNDER ANESTHESIA;  Surgeon: Jovani Hahn MD;  Location: [facility]     IRRIGATION AND DEBRIDEMENT KNEE, COMBINED Right 11/3/2022    Procedure: RIGHT KNEE OPEN IRRIGATION AND DEBRIDEMENT WITH SYNOVECTOMY;  Surgeon: Jovani Hahn MD;  Location: [facility]     IRRIGATION AND DEBRIDEMENT NECK, COMBINED Left 9/28/2022    Procedure: Open IRRIGATION AND DEBRIDEMENT of Sternal Clavicular Joint Left;  Surgeon: Jovani Hahn MD;  Location: [facility]     IRRIGATION AND DEBRIDEMENT SHOULDER, COMBINED Left 10/2/2022    Procedure: Left Sternoclavicular Joint Irrigation And Debridement;  Surgeon: Zeke Vazquez MD;  Location: [facility]     Social History     Socioeconomic History     Marital status: Single     Spouse name: None     Number of children: None     Years of education: None     Highest education level: None   Tobacco Use     Smoking status: Former     Types: Cigarettes     Smokeless tobacco: Never     Tobacco comments:     Minimal smoking   Vaping Use     Vaping Use: Never used   Substance and Sexual Activity     Alcohol use: Yes     Comment: Occ     Drug use: Never   Social History Narrative    June 30, 2023    ENVIRONMENTAL HISTORY: [place] The home is heated with a forced air. They does have central air conditioning. The patient's bedroom is furnished with carpeting in bedroom,  "allergen mattress cover, allergen pillowcase cover, and fabric window coverings.  Pets inside the house include 1 dog(s). There is no history of cockroach or mice infestation. There is/are 0 smokers in the house.  The house does not have a damp basement.         Kaur Padilla MA           Review of Systems   Constitutional: Negative for chills and fever.   HENT: Negative for congestion, ear pain, postnasal drip, rhinorrhea, sinus pressure, sinus pain, sneezing and sore throat.    Eyes: Negative for pain, discharge, redness and itching.   Respiratory: Negative for cough, chest tightness, shortness of breath and wheezing.    Cardiovascular: Negative for chest pain.   Allergic/Immunologic: Negative for food allergies.   All other systems reviewed and are negative.          Current Outpatient Medications:      acetaminophen (TYLENOL) 500 MG tablet, Take 2 tablets (1,000 mg) by mouth every 6 hours (Patient not taking: Reported on 2/16/2023), Disp: , Rfl:      apixaban ANTICOAGULANT (ELIQUIS) 5 MG tablet, Take 1 tablet (5 mg) by mouth 2 times daily (Patient not taking: Reported on 11/22/2022), Disp: 60 tablet, Rfl: 1     loratadine (CLARITIN) 10 MG tablet, Take 10 mg by mouth daily as needed for allergies (Patient not taking: Reported on 2/16/2023), Disp: , Rfl:   Immunization History   Administered Date(s) Administered     COVID-19 Monovalent 18+ (Moderna) 04/14/2021, 05/12/2021     Allergies   Allergen Reactions     Adhesive Tape      Sticky bandaids     Ceftriaxone Other (See Comments)     Neutropenia and possibly also anemia     Amoxicillin Rash     Unsure if amoxicillin was the cause, started multiple other medications around the day he started this medication. Also endorsed some intermittent throat tightening and difficulty swallowing.      OBJECTIVE:                                                                 /78   Pulse 77   Temp 97.8  F (36.6  C) (Tympanic)   Resp 16   Ht 1.835 m (6' 0.24\")   " Wt 87.5 kg (193 lb)   SpO2 98%   BMI 26.00 kg/m          Physical Exam  Vitals and nursing note reviewed.   Constitutional:       General: He is not in acute distress.     Appearance: He is not diaphoretic.   HENT:      Head: Normocephalic and atraumatic.      Right Ear: Tympanic membrane, ear canal and external ear normal.      Left Ear: Tympanic membrane, ear canal and external ear normal.      Nose: No mucosal edema or rhinorrhea.      Right Turbinates: Not enlarged, swollen or pale.      Left Turbinates: Not enlarged, swollen or pale.      Mouth/Throat:      Lips: Pink.      Mouth: Mucous membranes are moist.      Pharynx: Oropharynx is clear. No pharyngeal swelling, oropharyngeal exudate or posterior oropharyngeal erythema.   Eyes:      General:         Right eye: No discharge.         Left eye: No discharge.      Conjunctiva/sclera: Conjunctivae normal.   Cardiovascular:      Rate and Rhythm: Normal rate and regular rhythm.      Heart sounds: Normal heart sounds. No murmur heard.  Pulmonary:      Effort: Pulmonary effort is normal. No respiratory distress.      Breath sounds: Normal breath sounds and air entry. No stridor, decreased air movement or transmitted upper airway sounds. No decreased breath sounds, wheezing, rhonchi or rales.   Musculoskeletal:         General: Normal range of motion.      Comments: Surgical scar on the right knee   Skin:     General: Skin is warm.      Comments: No visible urticaria or angioedema on today's exam.   Neurological:      Mental Status: He is alert and oriented to person, place, and time.   Psychiatric:         Mood and Affect: Mood normal.         Behavior: Behavior normal.             ASSESSMENT/PLAN:    Urticaria    Considering the onset of symptoms, an IgE mediated reaction to alcohol is not very high in the differential, although not impossible. There is a case report of prolonged generalized rash occurring four to five hours after ingestion of multiple types of  alcoholic beverages in a patient with history of contact allergy to alcohol  Other consideration is that either alcohol consumption predisposes him to react to something else, or there is just a coincidence. In his case symptoms occur half of a time he drink alcohol.   - I recommend the patient to pay attention to his symptoms.  Recommend making a log of activities, foods, drinks, and drugs within several hours before hives, and if there is for sure relationship in between consuming alcohol day prior.   When he develops hives, I recommend treating them with cetirizine 10 mg by mouth once-twice daily.         Return in about 3 months (around 9/30/2023), or if symptoms worsen or fail to improve.    Thank you for allowing us to participate in the care of this patient. Please feel free to contact us if there are any questions or concerns about the patient.    Disclaimer: This note consists of symbols derived from keyboarding, dictation and/or voice recognition software. As a result, there may be errors in the script that have gone undetected. Please consider this when interpreting information found in this chart.    Wilton Galvan MD, FAAAAI, FACAAI  Allergy, Asthma and Immunology     [facility]

## 2023-06-30 NOTE — LETTER
"    6/30/2023         RE: Sanket Guerrero  [address] [address]        Dear Colleague,    Thank you for referring your patient, Sanket Guerrero, to the [facility]. Please see a copy of my visit note below.    SUBJECTIVE:                                                                   Sanket Guerrero is a 32 year old male who presents today to our Allergy Clinic at [facility]; He is being seen in consultation at the request of Idania Tenorio CNP, for an evaluation of urticaria.  When he started drinking alcohol at 20 years old, he developed a \"bulge \"above his knees. Most of the time, it was on the right side, but sometimes, it was on the left side or bilateral. It would be erythematous and itchy. It would appear hours after alcohol ingestion. Not uncommon for him, the bulge would develop the next day. It would resolve by the end of the day.  He had surgery on his right knee for a different reason, and since then, the \"bulge \" with alcohol resolved. Since 2020, he has been breaking out in pruritic hives on his chest and extremities 4 to 14 hours after alcohol ingestion. Quite often, it would happen the next day, more than 10 hours after alcohol ingestion. Not associated with vomiting, wheezing, visible angioedema, or difficulty breathing or swallowing.   By the next day, hives would resolve, sometimes sooner. He usually takes diphenhydramine, and he thinks that it is somewhat helpful. Does not happen all the time, maybe 50% of the time. It does not happen particularly with a particular alcoholic beverage. It can happen with any. He denies taking NSAIDs or exercising prior to those days.        Patient Active Problem List   Diagnosis     Left-sided chest wall pain     Effusion of right knee joint     Febrile illness     Septic arthritis of knee, right (H)       Past Medical History:   Diagnosis Date     Chronic infection      DVT (deep venous thrombosis) " (H)       No data available        Past Surgical History:   Procedure Laterality Date     ARTHROSCOPY KNEE INCISION AND DRAINAGE Bilateral 9/26/2022    Procedure: Right knee arthroscopic irrigation and debridement, partial synovectomy and left sternoclavicular joint needle aspiration;  Surgeon: Jovani Hahn MD;  Location: [facility]     ARTHROSCOPY KNEE IRRIGATION AND DEBRIDEMENT Right 9/28/2022    Procedure: Right IRRIGATION AND DEBRIDEMENT, KNEE, ARTHROSCOPIC;  Surgeon: Jovani Hahn MD;  Location: [facility]     ARTHROSCOPY KNEE IRRIGATION AND DEBRIDEMENT Right 10/2/2022    Procedure: IRRIGATION AND DEBRIDEMENT, KNEE, ARTHROSCOPIC RIGHT;  Surgeon: Zeke Vazquez MD;  Location: [facility]     EXAM UNDER ANESTHESIA, MANIPULATE JOINT (LOCATION) Right 11/3/2022    Procedure: AND MANIPULATION UNDER ANESTHESIA;  Surgeon: Jovani Hahn MD;  Location: [facility]     IRRIGATION AND DEBRIDEMENT KNEE, COMBINED Right 11/3/2022    Procedure: RIGHT KNEE OPEN IRRIGATION AND DEBRIDEMENT WITH SYNOVECTOMY;  Surgeon: Jovani Hahn MD;  Location: [facility]     IRRIGATION AND DEBRIDEMENT NECK, COMBINED Left 9/28/2022    Procedure: Open IRRIGATION AND DEBRIDEMENT of Sternal Clavicular Joint Left;  Surgeon: Jovani Hahn MD;  Location: [facility]     IRRIGATION AND DEBRIDEMENT SHOULDER, COMBINED Left 10/2/2022    Procedure: Left Sternoclavicular Joint Irrigation And Debridement;  Surgeon: Zeke Vazquez MD;  Location: [facility]     Social History     Socioeconomic History     Marital status: Single     Spouse name: None     Number of children: None     Years of education: None     Highest education level: None   Tobacco Use     Smoking status: Former     Types: Cigarettes     Smokeless tobacco: Never     Tobacco comments:     Minimal smoking   Vaping Use     Vaping Use: Never used   Substance and Sexual Activity     Alcohol use: Yes     Comment: Occ     Drug use: Never   Social History Narrative     June 30, 2023    ENVIRONMENTAL HISTORY: [place] The home is heated with a forced air. They does have central air conditioning. The patient's bedroom is furnished with carpeting in bedroom, allergen mattress cover, allergen pillowcase cover, and fabric window coverings.  Pets inside the house include 1 dog(s). There is no history of cockroach or mice infestation. There is/are 0 smokers in the house.  The house does not have a damp basement.         Kaur Padilla MA           Review of Systems   Constitutional: Negative for chills and fever.   HENT: Negative for congestion, ear pain, postnasal drip, rhinorrhea, sinus pressure, sinus pain, sneezing and sore throat.    Eyes: Negative for pain, discharge, redness and itching.   Respiratory: Negative for cough, chest tightness, shortness of breath and wheezing.    Cardiovascular: Negative for chest pain.   Allergic/Immunologic: Negative for food allergies.   All other systems reviewed and are negative.          Current Outpatient Medications:      acetaminophen (TYLENOL) 500 MG tablet, Take 2 tablets (1,000 mg) by mouth every 6 hours (Patient not taking: Reported on 2/16/2023), Disp: , Rfl:      apixaban ANTICOAGULANT (ELIQUIS) 5 MG tablet, Take 1 tablet (5 mg) by mouth 2 times daily (Patient not taking: Reported on 11/22/2022), Disp: 60 tablet, Rfl: 1     loratadine (CLARITIN) 10 MG tablet, Take 10 mg by mouth daily as needed for allergies (Patient not taking: Reported on 2/16/2023), Disp: , Rfl:   Immunization History   Administered Date(s) Administered     COVID-19 Monovalent 18+ (Moderna) 04/14/2021, 05/12/2021     Allergies   Allergen Reactions     Adhesive Tape      Sticky bandaids     Ceftriaxone Other (See Comments)     Neutropenia and possibly also anemia     Amoxicillin Rash     Unsure if amoxicillin was the cause, started multiple other medications around the day he started this medication. Also endorsed some  "intermittent throat tightening and difficulty swallowing.      OBJECTIVE:                                                                 /78   Pulse 77   Temp 97.8  F (36.6  C) (Tympanic)   Resp 16   Ht 1.835 m (6' 0.24\")   Wt 87.5 kg (193 lb)   SpO2 98%   BMI 26.00 kg/m          Physical Exam  Vitals and nursing note reviewed.   Constitutional:       General: He is not in acute distress.     Appearance: He is not diaphoretic.   HENT:      Head: Normocephalic and atraumatic.      Right Ear: Tympanic membrane, ear canal and external ear normal.      Left Ear: Tympanic membrane, ear canal and external ear normal.      Nose: No mucosal edema or rhinorrhea.      Right Turbinates: Not enlarged, swollen or pale.      Left Turbinates: Not enlarged, swollen or pale.      Mouth/Throat:      Lips: Pink.      Mouth: Mucous membranes are moist.      Pharynx: Oropharynx is clear. No pharyngeal swelling, oropharyngeal exudate or posterior oropharyngeal erythema.   Eyes:      General:         Right eye: No discharge.         Left eye: No discharge.      Conjunctiva/sclera: Conjunctivae normal.   Cardiovascular:      Rate and Rhythm: Normal rate and regular rhythm.      Heart sounds: Normal heart sounds. No murmur heard.  Pulmonary:      Effort: Pulmonary effort is normal. No respiratory distress.      Breath sounds: Normal breath sounds and air entry. No stridor, decreased air movement or transmitted upper airway sounds. No decreased breath sounds, wheezing, rhonchi or rales.   Musculoskeletal:         General: Normal range of motion.      Comments: Surgical scar on the right knee   Skin:     General: Skin is warm.      Comments: No visible urticaria or angioedema on today's exam.   Neurological:      Mental Status: He is alert and oriented to person, place, and time.   Psychiatric:         Mood and Affect: Mood normal.         Behavior: Behavior normal.             ASSESSMENT/PLAN:    Urticaria    Considering the " onset of symptoms, an IgE mediated reaction to alcohol is not very high in the differential, although not impossible. There is a case report of prolonged generalized rash occurring four to five hours after ingestion of multiple types of alcoholic beverages in a patient with history of contact allergy to alcohol  Other consideration is that either alcohol consumption predisposes him to react to something else, or there is just a coincidence. In his case symptoms occur half of a time he drink alcohol.   - I recommend the patient to pay attention to his symptoms.  Recommend making a log of activities, foods, drinks, and drugs within several hours before hives, and if there is for sure relationship in between consuming alcohol day prior.   When he develops hives, I recommend treating them with cetirizine 10 mg by mouth once-twice daily.         Return in about 3 months (around 9/30/2023), or if symptoms worsen or fail to improve.    Thank you for allowing us to participate in the care of this patient. Please feel free to contact us if there are any questions or concerns about the patient.    Disclaimer: This note consists of symbols derived from keyboarding, dictation and/or voice recognition software. As a result, there may be errors in the script that have gone undetected. Please consider this when interpreting information found in this chart.    Wilton Galvan MD, FAAAAI, FACSANDYI  Allergy, Asthma and Immunology     [facility]        Again, thank you for allowing me to participate in the care of your patient.        Sincerely,        Wilton Galvan MD

## 2023-07-03 ASSESSMENT — ENCOUNTER SYMPTOMS: CHEST TIGHTNESS: 0

## 2023-10-22 ENCOUNTER — HEALTH MAINTENANCE LETTER (OUTPATIENT)
Age: 33
End: 2023-10-22

## 2024-08-15 NOTE — PROGRESS NOTES
Prep Survey      Flowsheet Row Responses   [facility] facility patient discharged from? Non-BH   Is LACE score < 7 ? Non-BH Discharge   Eligibility Not Eligible   What are the reasons patient is not eligible? Other  [recent hospital admission not detected]   Does the patient have one of the following disease processes/diagnoses(primary or secondary)? Other   Prep survey completed? Yes            Kesha GODINEZ - Registered Nurse           [facility]    Hospitalist Progress Note    Date of Service (when I saw the patient): 09/30/2022    Assessment & Plan   Sanket Guerrero is a 32 year old male who was admitted on 9/25/2022 with several days of pain and swelling of the right knee.    Septic left sternoclavicular joint  Septic right knee  S/p right knee washout 9/26    Left sternoclavicular joint, left shoulder and right knee inflammatory process. Right knee with 353k WBC though only 24% neutrophils and no crystals. Concern for septic knee with that WBC count. CRP also very high 310. S/p right knee washout today 9/26.    - Started on empiric treatment with cefepime and vancomycin on 9/26 await cultures    - Change to ceftriaxone 2 g q 24 hrs on 9/27   - GC/Chlamydia urine PCR is negative   - Sternoclavicular joint aspiration obtained intraoperatively NGTD   - Nasal PCR for MRSA    - Check Lymes DNA PCR   - Synovial fluid cultures from R knee and L clavicle both growing Strep agalactiae   - Patient to go to OR for I&D    - Blood cultures 9/25, 9/26, 9/27, 9/28, 9/29 NGTD   - TTE negative for endocarditis.   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  Patient felt these were often associated with episodes of beer drinking.  However he drinks beer frequently so difficult to say at that the really the etiology.  Usually does not have pain or as large effusions this time.  He always felt it was gout though never actually saw physician.  Would go away spontaneously.  This episode was unusual at that there is much more pain in the knee.  He has swelling also in the left shoulder this time  and left sternoclavicular joint.  Patient denies risk factors for GC or chlamydia.  He and his longtime girlfriend were tested for STDs about 5 years ago and were negative and he had had joint effusions before and after that.   - CRP improving on antibiotics   - Consider antiinflammatory treatment if not resolving   - Referral to rheumatology at discharge for recurrent arthritis unless clearly infectious   - As this appears infectious, would hold on rheumatology referral     Diet: Advance Diet as Tolerated: Regular Diet Adult    DVT Prophylaxis: Low Risk/Ambulatory with no VTE prophylaxis indicated  [place] Catheter: Not present  Central Lines: None  Code Status: Full Code      Disposition: Expected discharge in days once .    Tano Fuentes MD    Interval History   The patient resting in bed.  He denies rigors in association with fever this morning.  Has otherwise felt well.    -Data reviewed today: I reviewed all new labs and imaging results over the last 24 hours. I personally reviewed no images or EKG's today.    Physical Exam   Temp: 99.2  F (37.3  C) Temp src: Oral BP: 126/77 Pulse: 114   Resp: 18 SpO2: 99 % O2 Device: None (Room air)    Vitals:    09/25/22 1521 09/26/22 0303   Weight: 85.7 kg (189 lb) 86.9 kg (191 lb 9.3 oz)     Vital Signs with Ranges  Temp:  [98.9  F (37.2  C)-101.2  F (38.4  C)] 99.2  F (37.3  C)  Pulse:  [108-114] 114  Resp:  [18] 18  BP: (120-136)/(77-86) 126/77  SpO2:  [98 %-100 %] 99 %  I/O last 3 completed shifts:  In: 1100 [P.O.:1100]  Out: 2509 [Urine:2500; Drains:9]    Gen: Well nourished, well developed, alert and oriented x 3, no acute distressed  HEENT: Atraumatic, normocephalic; sclera non-injected, anicterric; oral mucosa moist, no lesion, no exudate  Lungs: Clear to ausculation, no wheezes, no rhonchi, no rales  Heart: Regular rate, regular rhythm, no gallops, no rubs, no murmurs  GI: Bowel sound normal, no hepatosplenomegaly, no masses, non-tender, non-distended, no  guarding, no rebound tenderness  Lymph: No lymphadenopathy, no edema  Skin: No rashes, no chronic venous stasis, R knee in immobilizer, bandage over left sternoclavicular joint CDI    Medications       aspirin  81 mg Oral BID     cefTRIAXone  2 g Intravenous Q24H     polyethylene glycol  17 g Oral Daily     senna-docusate  1 tablet Oral BID     sodium chloride (PF)  3 mL Intracatheter Q8H       Data   Recent Labs   Lab 09/29/22  0616 09/28/22  0454 09/27/22  0454 09/26/22  0419   WBC  --  8.2 8.2 11.9*   HGB  --  10.6* 10.4* 11.8*   MCV  --  87 87 85   PLT  --  412 280 226   NA  --  141 141 135*   POTASSIUM  --  4.5 4.5 4.0   CHLORIDE  --  103 105 99   CO2  --  28 27 26   BUN  --  5.2* 7.3 9.6   CR 0.82 0.81 0.87 0.81   ANIONGAP  --  10 9 10   JOCELYNE  --  8.8 8.2* 8.7   GLC  --  113* 115* 126*   ALBUMIN  --  3.0* 2.7*  --    PROTTOTAL  --  6.8 6.2*  --    BILITOTAL  --  0.7 1.2  --    ALKPHOS  --  132* 102  --    ALT  --  86* 92*  --    AST  --  60* 100*  --        No results found for this or any previous visit (from the past 24 hour(s)).

## 2024-10-15 NOTE — ANESTHESIA PREPROCEDURE EVALUATION
Anesthesia Pre-Procedure Evaluation    Patient: Sanket Guerrero   MRN: [id number] : 1990        Procedure : Procedure(s):  ARTHROSCOPY, KNEE, WITH INCISION AND DRAINAGE          Past Medical History:   Diagnosis Date     Chronic infection      DVT (deep venous thrombosis) (H)       Past Surgical History:   Procedure Laterality Date     ARTHROSCOPY KNEE INCISION AND DRAINAGE Bilateral 2022    Procedure: Right knee arthroscopic irrigation and debridement, partial synovectomy and left sternoclavicular joint needle aspiration;  Surgeon: Jovani Hahn MD;  Location: [facility]     ARTHROSCOPY KNEE IRRIGATION AND DEBRIDEMENT Right 2022    Procedure: Right IRRIGATION AND DEBRIDEMENT, KNEE, ARTHROSCOPIC;  Surgeon: Jovani Hahn MD;  Location: [facility]     ARTHROSCOPY KNEE IRRIGATION AND DEBRIDEMENT Right 10/2/2022    Procedure: IRRIGATION AND DEBRIDEMENT, KNEE, ARTHROSCOPIC RIGHT;  Surgeon: Zeke Vazquez MD;  Location: [facility]     IRRIGATION AND DEBRIDEMENT NECK, COMBINED Left 2022    Procedure: Open IRRIGATION AND DEBRIDEMENT of Sternal Clavicular Joint Left;  Surgeon: Jovani Hahn MD;  Location: [facility]     IRRIGATION AND DEBRIDEMENT SHOULDER, COMBINED Left 10/2/2022    Procedure: Left Sternoclavicular Joint Irrigation And Debridement;  Surgeon: Zeke Vazquez MD;  Location: [facility]      Allergies   Allergen Reactions     Ceftriaxone Other (See Comments)     Neutropenia and possibly also anemia     Amoxicillin Rash     Unsure if amoxicillin was the cause, started multiple other medications around the day he started this medication. Also endorsed some intermittent throat tightening and difficulty swallowing.       Social History     Tobacco Use     Smoking status: Former     Types: Cigarettes     Smokeless tobacco: Never     Tobacco comments:     Minimal smoking   Substance Use Topics     Alcohol use: Not Currently      Wt Readings from Last 1 Encounters:   22 78 kg (172  RN spoke with pt's son Anthony who states pt has slowly been losing more weight, has a low appetite, is weaker and is sleeping more. Pt's weight 2 days ago was 99 pounds. Pt is currently undergoing radiation therapy.. Pt has tried various appetite stimulants, currently takes mirtazapine, tried cyproheptadine but was discontinued, was prescribed marinol recently but pharmacist said this is out of stock everywhere. Pt's son Anthony is interested in megestrol although he is concerned a little about side effects but he is willing to try, wants to know what PCP thinks. Anthony says pt is probably not drinking enough fluids, but usually forgets to or doesn't feel thirsty. Pt has dementia. No falls recently, pt utilzes walker diligently.     RN to consult with PCP. RN advised to continue to encourage frequent small meals, push fluids if possible and give reminders. Pharmacy selected, please advise thanks.   lb)        Anesthesia Evaluation   Pt has had prior anesthetic. Type: General.    No history of anesthetic complications       ROS/MED HX  ENT/Pulmonary:  - neg pulmonary ROS     Neurologic:  - neg neurologic ROS     Cardiovascular:  - neg cardiovascular ROS   (+) -----Previous cardiac testing   Echo: Date: 9-2022 Results:  Interpretation Summary     Left ventricular systolic function is normal.  The visual ejection fraction is 60-65%.  The right ventricle is normal in structure, function and size.  Valve structures without significant dysfunction. No apparent valvular  vegetations.  The inferior vena cava was normal in size with preserved respiratory  variability.  There is no pericardial effusion.     No prior study for comparison. Consider LINDA if clnically indicated to assess  for endocarditis.  Stress Test: Date: Results:    ECG Reviewed: Date: Results:    Cath: Date: Results:      METS/Exercise Tolerance:     Hematologic:  - neg hematologic  ROS     Musculoskeletal:  - neg musculoskeletal ROS     GI/Hepatic:  - neg GI/hepatic ROS     Renal/Genitourinary:  - neg Renal ROS     Endo: Comment: overweight      Psychiatric/Substance Use:  - neg psychiatric ROS     Infectious Disease: Comment: R knee ongoing infection seems to be clearing        Malignancy:  - neg malignancy ROS     Other:  - neg other ROS          Physical Exam    Airway        Mallampati: I   TM distance: > 3 FB   Neck ROM: full   Mouth opening: > 3 cm    Respiratory Devices and Support         Dental  no notable dental history         Cardiovascular   cardiovascular exam normal          Pulmonary   pulmonary exam normal                OUTSIDE LABS:  CBC:   Lab Results   Component Value Date    WBC 5.7 10/28/2022    WBC 3.7 (L) 10/24/2022    HGB 11.0 (L) 10/28/2022    HGB 10.8 (L) 10/24/2022    HCT 35.5 (L) 10/28/2022    HCT 34.8 (L) 10/24/2022     10/28/2022     10/24/2022     BMP:   Lab Results   Component Value Date      10/28/2022     10/24/2022    POTASSIUM 3.8 10/28/2022    POTASSIUM 4.1 10/24/2022    CHLORIDE 97 (L) 10/28/2022    CHLORIDE 98 10/24/2022    CO2 28 10/28/2022    CO2 26 10/24/2022    BUN 9.4 10/28/2022    BUN 10.6 10/24/2022    CR 0.90 10/28/2022    CR 0.86 10/24/2022     (H) 10/28/2022    GLC 94 10/24/2022     COAGS:   Lab Results   Component Value Date    INR 1.14 10/04/2022     POC: No results found for: BGM, HCG, HCGS  HEPATIC:   Lab Results   Component Value Date    ALBUMIN 3.9 10/28/2022    PROTTOTAL 9.3 (H) 10/28/2022    ALT 46 10/28/2022    AST 29 10/28/2022    ALKPHOS 64 10/28/2022    BILITOTAL 0.3 10/28/2022     OTHER:   Lab Results   Component Value Date    LACT 1.2 10/01/2022    JOCELYNE 9.7 10/28/2022    CRP 17.09 (H) 10/28/2022     (H) 10/05/2022       Anesthesia Plan    ASA Status:  2   NPO Status:  NPO Appropriate    Anesthesia Type: General.     - Airway: LMA   Induction: Intravenous.   Maintenance: Balanced.        Consents    Anesthesia Plan(s) and associated risks, benefits, and realistic alternatives discussed. Questions answered and patient/representative(s) expressed understanding.     - Discussed: Risks, Benefits and Alternatives for BOTH SEDATION and the PROCEDURE were discussed     - Discussed with:  Patient         Postoperative Care    Pain management: IV analgesics.   PONV prophylaxis: Ondansetron (or other 5HT-3), Background Propofol Infusion     Comments:                    KENNY MORAN MD

## 2024-11-05 ENCOUNTER — HOSPITAL ENCOUNTER (EMERGENCY)
Facility: CLINIC | Age: 34
Discharge: HOME | End: 2024-11-05
Attending: PHYSICIAN ASSISTANT | Admitting: PHYSICIAN ASSISTANT
Payer: COMMERCIAL

## 2024-11-05 VITALS
OXYGEN SATURATION: 100 % | DIASTOLIC BLOOD PRESSURE: 94 MMHG | TEMPERATURE: 97.8 F | RESPIRATION RATE: 16 BRPM | HEART RATE: 84 BPM | SYSTOLIC BLOOD PRESSURE: 134 MMHG

## 2024-11-05 DIAGNOSIS — S61.209A AVULSION OF SKIN OF FINGER, INITIAL ENCOUNTER: ICD-10-CM

## 2024-11-05 PROCEDURE — G0463 HOSPITAL OUTPT CLINIC VISIT: HCPCS | Mod: 25 | Performed by: PHYSICIAN ASSISTANT

## 2024-11-05 PROCEDURE — 99213 OFFICE O/P EST LOW 20 MIN: CPT | Performed by: PHYSICIAN ASSISTANT

## 2024-11-05 ASSESSMENT — COLUMBIA-SUICIDE SEVERITY RATING SCALE - C-SSRS
6. HAVE YOU EVER DONE ANYTHING, STARTED TO DO ANYTHING, OR PREPARED TO DO ANYTHING TO END YOUR LIFE?: NO
2. HAVE YOU ACTUALLY HAD ANY THOUGHTS OF KILLING YOURSELF IN THE PAST MONTH?: NO
1. IN THE PAST MONTH, HAVE YOU WISHED YOU WERE DEAD OR WISHED YOU COULD GO TO SLEEP AND NOT WAKE UP?: NO

## 2024-11-05 ASSESSMENT — ACTIVITIES OF DAILY LIVING (ADL): ADLS_ACUITY_SCORE: 0

## 2024-11-05 NOTE — Clinical Note
Sanket Guerrero was seen and treated in our emergency department on 11/5/2024.    He was evaluated for injury that occurred during the course of his work.  He may return to full activity as long as wound remains clean dry, covered.      Sincerely,     [facility] Emergency Dept

## 2024-11-05 NOTE — ED PROVIDER NOTES
History   No chief complaint on file.    ADIS Guerrero is a 34 year old male who presents to urgent care with concern over laceration to his left index finger which occurred during work-related injury earlier today.  Patient was removing computer monitors when finger became cut on metal casing support.  He had minimal pain in the area.  No concern for foreign body embedded in the wound. No distal numbness or paresthesias.  He is on long-term anticoagulants of Eliquis.  His tet[facility] records    Allergies:  Allergies   Allergen Reactions    Adhesive Tape      Sticky bandaids    Ceftriaxone Other (See Comments)     Neutropenia and possibly also anemia    Amoxicillin Rash     Unsure if amoxicillin was the cause, started multiple other medications around the day he started this medication. Also endorsed some intermittent throat tightening and difficulty swallowing.        Problem List:    Patient Active Problem List    Diagnosis Date Noted    Left-sided chest wall pain 09/26/2022     Priority: Medium    Effusion of right knee joint 09/26/2022     Priority: Medium    Febrile illness 09/26/2022     Priority: Medium    Septic arthritis of knee, right (H) 09/26/2022     Priority: Medium        Past Medical History:    Past Medical History:   Diagnosis Date    Chronic infection     DVT (deep venous thrombosis) (H)        Past Surgical History:    Past Surgical History:   Procedure Laterality Date    ARTHROSCOPY KNEE INCISION AND DRAINAGE Bilateral 9/26/2022    Procedure: Right knee arthroscopic irrigation and debridement, partial synovectomy and left sternoclavicular joint needle aspiration;  Surgeon: Jovani Hahn MD;  Location: [facility]    ARTHROSCOPY KNEE IRRIGATION AND DEBRIDEMENT Right 9/28/2022    Procedure: Right IRRIGATION AND DEBRIDEMENT, KNEE, ARTHROSCOPIC;  Surgeon: Jovani Hahn MD;  Location: [facility]    ARTHROSCOPY KNEE IRRIGATION AND DEBRIDEMENT Right 10/2/2022    Procedure: IRRIGATION AND  DEBRIDEMENT, KNEE, ARTHROSCOPIC RIGHT;  Surgeon: Zeke Vazquez MD;  Location: [facility]    EXAM UNDER ANESTHESIA, MANIPULATE JOINT (LOCATION) Right 11/3/2022    Procedure: AND MANIPULATION UNDER ANESTHESIA;  Surgeon: Jovani Hahn MD;  Location: [facility]    IRRIGATION AND DEBRIDEMENT KNEE, COMBINED Right 11/3/2022    Procedure: RIGHT KNEE OPEN IRRIGATION AND DEBRIDEMENT WITH SYNOVECTOMY;  Surgeon: Jovani Hahn MD;  Location: [facility]    IRRIGATION AND DEBRIDEMENT NECK, COMBINED Left 9/28/2022    Procedure: Open IRRIGATION AND DEBRIDEMENT of Sternal Clavicular Joint Left;  Surgeon: Jovani Hahn MD;  Location: [facility]    IRRIGATION AND DEBRIDEMENT SHOULDER, COMBINED Left 10/2/2022    Procedure: Left Sternoclavicular Joint Irrigation And Debridement;  Surgeon: Zeke Vazquez MD;  Location: [facility]       Family History:    No family history on file.    Social History:  Marital Status:  Single [1]  Social History     Tobacco Use    Smoking status: Former     Types: Cigarettes    Smokeless tobacco: Never    Tobacco comments:     Minimal smoking   Vaping Use    Vaping status: Never Used   Substance Use Topics    Alcohol use: Yes     Comment: Occ    Drug use: Never        Medications:    acetaminophen (TYLENOL) 500 MG tablet  apixaban ANTICOAGULANT (ELIQUIS) 5 MG tablet  loratadine (CLARITIN) 10 MG tablet      Review of Systems  CONSTITUTIONAL:NEGATIVE for fever, chills, change in weight  INTEGUMENTARY/SKIN: POSITIVE for laceration left index finger NEGATIVE for other worrisome rashes or skin lesions   RESP:NEGATIVE for significant cough or SOB  MUSCULOSKELETAL: POSITIVE  for mild left ring finger pain   NEURO: NEGATIVE for weakness, dizziness or paresthesias    Physical Exam   BP: (!) 134/94  Pulse: 84  Temp: 97.8  F (36.6  C)  Resp: 16  SpO2: 100 %  Physical Exam  Constitutional:       General: He is not in acute distress.     Appearance: He is not ill-appearing or  toxic-appearing.   Cardiovascular:      Pulses:           Radial pulses are 2+ on the left side.   Musculoskeletal:      Left hand: Laceration and tenderness present. No swelling, deformity or bony tenderness. Normal range of motion. Normal strength. Normal sensation. There is no disruption of two-point discrimination. Normal capillary refill. Normal pulse.   Skin:     Findings: Ecchymosis and laceration present. No abrasion, erythema or rash.      Comments: 1 cm long by 0.5 cm wide V shaped superficial flap laceration to distal left long finger with ecchymosis of flap portion of laceration, mild oozing blood.     Neurological:      Mental Status: He is alert.      Sensory: No sensory deficit.         ED Course        Procedures       Critical Care time:  none  No results found for this or any previous visit (from the past 24 hours).    Medications - No data to display    Wound was cleaned with Hibiclens, normal saline, after discussing risk/benefits patient agreed that flap portion of laceration is not likely viable long term and agreed to removal. Bleeding was then controlled with small injection of 0.1 lidocaine with epinephrine, surgicel and tube gauze.    Assessments & Plan (with Medical Decision Making)     I have reviewed the nursing notes.    I have reviewed the findings, diagnosis, plan and need for follow up with the patient.    New Prescriptions    No medications on file     Final diagnoses:   Avulsion of skin of finger, initial encounter     34-year-old male presents to urgent care with concern over laceration to his left index finger which occurred in work-related injury earlier today concerns for intermittent persistent bleeding.  Physical exam findings significant for 1 cm long by 0.5 cm wide near avulsion to the left finger.  After discussing versus benefits patient agreed that flap portion of laceration is not likely viable long-term agreed to removal.  Hemostasis was achieved with lidocaine with  epinephrine injection, Surgicel.  He was discharged home stable with instructions for dressing change in 24 hours.  Wound care instructions, signs of infection, worrisome reasons to return discussed.  Follow-up as needed.    Disclaimer: This note consists of symbols derived from keyboarding, dictation, and/or voice recognition software. As a result, there may be errors in the script that have gone undetected.  Please consider this when interpreting information found in the chart.      11/5/2024   [facility] EMERGENCY DEPT       Elle Shi PA-C  11/07/24 2049

## 2024-12-15 ENCOUNTER — HEALTH MAINTENANCE LETTER (OUTPATIENT)
Age: 34
End: 2024-12-15

## (undated) DEVICE — DRSG DRAIN 4X4" 7086

## (undated) DEVICE — GLOVE PROTEXIS POWDER FREE 7.5 ORTHOPEDIC 2D73ET75

## (undated) DEVICE — SUTURE PDS 1 54IN TP1+ VIOLET PDP879G

## (undated) DEVICE — PREP CHLORAPREP 26ML TINTED ORANGE  260815

## (undated) DEVICE — GLOVE PROTEXIS W/NEU-THERA 8.0  2D73TE80

## (undated) DEVICE — DRAIN JACKSON PRATT RESERVOIR 100ML SU130-1305

## (undated) DEVICE — BNDG ELASTIC 6" DBL LENGTH UNSTERILE 6611-16

## (undated) DEVICE — DECANTER VIAL 2006S

## (undated) DEVICE — SUTURE VICRYL+ 2-0 27IN CT-1 UND VCP259H

## (undated) DEVICE — GOWN XLG DISP 9545

## (undated) DEVICE — DRAPE SHEET REV FOLD 3/4 9349

## (undated) DEVICE — PLATE GROUNDING ADULT W/CORD 9165L

## (undated) DEVICE — SUCTION MANIFOLD NEPTUNE 2 SYS 4 PORT 0702-020-000

## (undated) DEVICE — DRSG PRIMAPORE 03 1/8X6" 66000318

## (undated) DEVICE — IMM KNEE 24" 08142674

## (undated) DEVICE — HOLDER LIMB VELCRO OR 0814-1533

## (undated) DEVICE — KIT CULTURE TRANSPORT SYS A.C.T. II DUAL ANEROBE R124022

## (undated) DEVICE — PAD FLOOR SURGISAFE

## (undated) DEVICE — SU ETHILON 3-0 PS-2 18" 1669H

## (undated) DEVICE — CUSTOM PACK TOTAL KNEE SOP5BTKHEC

## (undated) DEVICE — GLOVE PROTEXIS BLUE W/NEU-THERA 6.5  2D73EB65

## (undated) DEVICE — SOL NACL 0.9% IRRIG 1000ML BOTTLE 2F7124

## (undated) DEVICE — GLOVE PROTEXIS W/NEU-THERA 6.5  2D73TE65

## (undated) DEVICE — Device

## (undated) DEVICE — PEN MARKING SKIN W/LABELS 31145884

## (undated) DEVICE — GOWN LG DISP 9515

## (undated) DEVICE — DRAIN JACKSON PRATT 07FR ROUND SU130-1320

## (undated) DEVICE — GOWN IMPERVIOUS SPECIALTY XLG/XLONG 32474

## (undated) DEVICE — DRAPE IOBAN LG .375X23.5" 6648EZ

## (undated) DEVICE — SU MONOCRYL 3-0 PS-2 18" UND Y497G

## (undated) DEVICE — GLOVE PROTEXIS BLUE W/NEU-THERA 7.5  2D73EB75

## (undated) DEVICE — SURGICEL POWDER ABSORBABLE HEMOSTAT 3GM 3013SP

## (undated) DEVICE — SOL NACL 0.9% IRRIG 1000ML BOTTLE 07138-09

## (undated) DEVICE — SPONGE LAP 18X18" X8435

## (undated) DEVICE — PACK ARTHROSCOPY KNEE

## (undated) DEVICE — SOL WATER IRRIG 1000ML BOTTLE 2F7114

## (undated) DEVICE — SLING ARM LG 79-99157

## (undated) DEVICE — SUCTION TIP YANKAUER FLEX ORTHO K62

## (undated) DEVICE — ABLATOR ARTHREX APOLLO RF MP90 ASPIRATING 90DEG AR-9811

## (undated) DEVICE — DRAPE THYROID

## (undated) DEVICE — LIGHT HANDLE X2

## (undated) DEVICE — TUBING SUCTION 12"X1/4" N612

## (undated) DEVICE — GLOVE PROTEXIS POWDER FREE 7.0 ORTHOPEDIC 2D73ET70

## (undated) DEVICE — KIT DRAIN CLOSED WOUND SUCTION MED 400ML RESVR

## (undated) DEVICE — BUR ARTHREX COOLCUT EXCALIBUR CVD 4.0MMX13CM AR-8400CEX

## (undated) DEVICE — DRAIN RND 1/8 10FR SIL 0070210

## (undated) DEVICE — BUR ARTHREX COOLCUT EXCALIBUR 4.0MMX13CM AR-8400EX

## (undated) DEVICE — CONTAINER URINE SPEC 4OZ STRL 1053

## (undated) DEVICE — GLOVE BIOGEL PI SZ 7.5 40875

## (undated) DEVICE — BLADE KNIFE SURG 15 371115

## (undated) DEVICE — GLOVE PROTEXIS BLUE W/NEU-THERA 7.0  2D73EB70

## (undated) DEVICE — SU ETHILON 3-0 FS-1 18" 669H

## (undated) DEVICE — DRAIN JACKSON PRATT 15FR ROUND SU130-1323

## (undated) DEVICE — SUTURE PDS 0 27IN CT1 + VIOLET PDP340H

## (undated) DEVICE — DRSG XEROFORM 5X9" 8884431605

## (undated) DEVICE — DRSG XEROFORM 1X8"

## (undated) DEVICE — DRSG AQUACEL AG 3.5X13.75" HYDROFIBER 412012

## (undated) DEVICE — SOL NACL 0.9% IRRIG 3000ML BAG 07972-08

## (undated) DEVICE — GLOVE PROTEXIS W/NEU-THERA 7.5  2D73TE75

## (undated) DEVICE — SU MONOCRYL 2-0 SH 27" UND Y417H

## (undated) DEVICE — TUBING ARTHROSCOPY PUMP ARTHREX AR-6410

## (undated) DEVICE — BANDAGE ELASTIC 6X550 LF DBL 593-96LF

## (undated) DEVICE — SOL WATER IRRIG 1000ML BOTTLE 07139-09

## (undated) RX ORDER — FENTANYL CITRATE 50 UG/ML
INJECTION, SOLUTION INTRAMUSCULAR; INTRAVENOUS
Status: DISPENSED
Start: 2022-09-26

## (undated) RX ORDER — PROPOFOL 10 MG/ML
INJECTION, EMULSION INTRAVENOUS
Status: DISPENSED
Start: 2022-09-26

## (undated) RX ORDER — PROPOFOL 10 MG/ML
INJECTION, EMULSION INTRAVENOUS
Status: DISPENSED
Start: 2022-09-28

## (undated) RX ORDER — LIDOCAINE HYDROCHLORIDE 10 MG/ML
INJECTION, SOLUTION EPIDURAL; INFILTRATION; INTRACAUDAL; PERINEURAL
Status: DISPENSED
Start: 2022-10-02

## (undated) RX ORDER — FENTANYL CITRATE 50 UG/ML
INJECTION, SOLUTION INTRAMUSCULAR; INTRAVENOUS
Status: DISPENSED
Start: 2022-10-02

## (undated) RX ORDER — ONDANSETRON 2 MG/ML
INJECTION INTRAMUSCULAR; INTRAVENOUS
Status: DISPENSED
Start: 2022-09-28

## (undated) RX ORDER — KETOROLAC TROMETHAMINE 30 MG/ML
INJECTION, SOLUTION INTRAMUSCULAR; INTRAVENOUS
Status: DISPENSED
Start: 2022-09-26

## (undated) RX ORDER — MEPERIDINE HYDROCHLORIDE 25 MG/ML
INJECTION INTRAMUSCULAR; INTRAVENOUS; SUBCUTANEOUS
Status: DISPENSED
Start: 2022-10-02

## (undated) RX ORDER — HYDROMORPHONE HCL IN WATER/PF 6 MG/30 ML
PATIENT CONTROLLED ANALGESIA SYRINGE INTRAVENOUS
Status: DISPENSED
Start: 2022-09-28

## (undated) RX ORDER — LIDOCAINE HYDROCHLORIDE AND EPINEPHRINE 10; 10 MG/ML; UG/ML
INJECTION, SOLUTION INFILTRATION; PERINEURAL
Status: DISPENSED
Start: 2022-10-02

## (undated) RX ORDER — CEFAZOLIN SODIUM 1 G/3ML
INJECTION, POWDER, FOR SOLUTION INTRAMUSCULAR; INTRAVENOUS
Status: DISPENSED
Start: 2022-11-03

## (undated) RX ORDER — FENTANYL CITRATE 50 UG/ML
INJECTION, SOLUTION INTRAMUSCULAR; INTRAVENOUS
Status: DISPENSED
Start: 2022-11-03

## (undated) RX ORDER — FENTANYL CITRATE 50 UG/ML
INJECTION, SOLUTION INTRAMUSCULAR; INTRAVENOUS
Status: DISPENSED
Start: 2022-09-28

## (undated) RX ORDER — MAGNESIUM SULFATE HEPTAHYDRATE 40 MG/ML
INJECTION, SOLUTION INTRAVENOUS
Status: DISPENSED
Start: 2022-09-26

## (undated) RX ORDER — ACETAMINOPHEN 325 MG/1
TABLET ORAL
Status: DISPENSED
Start: 2022-09-26

## (undated) RX ORDER — GLYCOPYRROLATE 0.2 MG/ML
INJECTION, SOLUTION INTRAMUSCULAR; INTRAVENOUS
Status: DISPENSED
Start: 2022-10-02

## (undated) RX ORDER — LIDOCAINE HYDROCHLORIDE 20 MG/ML
JELLY TOPICAL
Status: DISPENSED
Start: 2022-09-26

## (undated) RX ORDER — MEPERIDINE HYDROCHLORIDE 25 MG/ML
INJECTION INTRAMUSCULAR; INTRAVENOUS; SUBCUTANEOUS
Status: DISPENSED
Start: 2022-09-28

## (undated) RX ORDER — ONDANSETRON 2 MG/ML
INJECTION INTRAMUSCULAR; INTRAVENOUS
Status: DISPENSED
Start: 2022-10-02

## (undated) RX ORDER — OXYCODONE HYDROCHLORIDE 5 MG/1
TABLET ORAL
Status: DISPENSED
Start: 2022-09-28